# Patient Record
Sex: FEMALE | Race: WHITE | NOT HISPANIC OR LATINO | Employment: UNEMPLOYED | URBAN - METROPOLITAN AREA
[De-identification: names, ages, dates, MRNs, and addresses within clinical notes are randomized per-mention and may not be internally consistent; named-entity substitution may affect disease eponyms.]

---

## 2017-01-16 ENCOUNTER — HOSPITAL ENCOUNTER (OUTPATIENT)
Dept: MRI IMAGING | Facility: HOSPITAL | Age: 57
Discharge: HOME | End: 2017-01-16
Attending: PSYCHIATRY & NEUROLOGY
Payer: COMMERCIAL

## 2017-01-16 DIAGNOSIS — G44.039 EPISODIC PAROXYSMAL HEMICRANIA, NOT INTRACTABLE: ICD-10-CM

## 2017-01-16 PROCEDURE — 70551 MRI BRAIN STEM W/O DYE: CPT

## 2017-03-13 ENCOUNTER — ALLSCRIPTS OFFICE VISIT (OUTPATIENT)
Dept: OTHER | Facility: OTHER | Age: 57
End: 2017-03-13

## 2017-06-21 ENCOUNTER — ALLSCRIPTS OFFICE VISIT (OUTPATIENT)
Dept: OTHER | Facility: OTHER | Age: 57
End: 2017-06-21

## 2018-01-01 ENCOUNTER — OFFICE VISIT (OUTPATIENT)
Dept: NEUROLOGY | Facility: CLINIC | Age: 58
End: 2018-01-01
Payer: COMMERCIAL

## 2018-01-01 ENCOUNTER — OFFICE VISIT (OUTPATIENT)
Dept: INTERNAL MEDICINE CLINIC | Facility: CLINIC | Age: 58
End: 2018-01-01
Payer: COMMERCIAL

## 2018-01-01 VITALS
HEIGHT: 67 IN | HEART RATE: 76 BPM | OXYGEN SATURATION: 99 % | SYSTOLIC BLOOD PRESSURE: 138 MMHG | BODY MASS INDEX: 26.74 KG/M2 | DIASTOLIC BLOOD PRESSURE: 88 MMHG | WEIGHT: 170.4 LBS

## 2018-01-01 VITALS
HEART RATE: 78 BPM | SYSTOLIC BLOOD PRESSURE: 122 MMHG | DIASTOLIC BLOOD PRESSURE: 78 MMHG | HEIGHT: 67 IN | BODY MASS INDEX: 26.27 KG/M2 | WEIGHT: 167.4 LBS

## 2018-01-01 DIAGNOSIS — R25.1 TREMOR: ICD-10-CM

## 2018-01-01 DIAGNOSIS — I10 ESSENTIAL HYPERTENSION: Chronic | ICD-10-CM

## 2018-01-01 DIAGNOSIS — G44.229 CHRONIC TENSION-TYPE HEADACHE, NOT INTRACTABLE: ICD-10-CM

## 2018-01-01 DIAGNOSIS — Z23 ENCOUNTER FOR IMMUNIZATION: ICD-10-CM

## 2018-01-01 DIAGNOSIS — F17.219 CIGARETTE NICOTINE DEPENDENCE WITH NICOTINE-INDUCED DISORDER: Chronic | ICD-10-CM

## 2018-01-01 DIAGNOSIS — F41.9 ANXIETY: ICD-10-CM

## 2018-01-01 DIAGNOSIS — M81.0 OSTEOPOROSIS OF FOREARM: Chronic | ICD-10-CM

## 2018-01-01 DIAGNOSIS — Z12.31 ENCOUNTER FOR SCREENING MAMMOGRAM FOR BREAST CANCER: ICD-10-CM

## 2018-01-01 DIAGNOSIS — J43.2 CENTRILOBULAR EMPHYSEMA (HCC): Primary | Chronic | ICD-10-CM

## 2018-01-01 DIAGNOSIS — M05.79 RHEUMATOID ARTHRITIS INVOLVING MULTIPLE SITES WITH POSITIVE RHEUMATOID FACTOR (HCC): Chronic | ICD-10-CM

## 2018-01-01 DIAGNOSIS — G43.709 CHRONIC MIGRAINE WITHOUT AURA WITHOUT STATUS MIGRAINOSUS, NOT INTRACTABLE: Primary | ICD-10-CM

## 2018-01-01 PROCEDURE — 3008F BODY MASS INDEX DOCD: CPT | Performed by: INTERNAL MEDICINE

## 2018-01-01 PROCEDURE — 99214 OFFICE O/P EST MOD 30 MIN: CPT | Performed by: INTERNAL MEDICINE

## 2018-01-01 PROCEDURE — 90670 PCV13 VACCINE IM: CPT | Performed by: INTERNAL MEDICINE

## 2018-01-01 PROCEDURE — G0009 ADMIN PNEUMOCOCCAL VACCINE: HCPCS | Performed by: INTERNAL MEDICINE

## 2018-01-01 PROCEDURE — 99213 OFFICE O/P EST LOW 20 MIN: CPT | Performed by: PSYCHIATRY & NEUROLOGY

## 2018-01-01 RX ORDER — PYRIDOXINE HCL (VITAMIN B6) 100 MG
2 TABLET ORAL DAILY
COMMUNITY

## 2018-01-01 RX ORDER — ORPHENADRINE CITRATE 100 MG/1
100 TABLET, EXTENDED RELEASE ORAL
Qty: 30 TABLET | Refills: 1 | Status: SHIPPED | OUTPATIENT
Start: 2018-01-01 | End: 2019-01-01 | Stop reason: SDUPTHER

## 2018-01-01 RX ORDER — DULOXETIN HYDROCHLORIDE 30 MG/1
30 CAPSULE, DELAYED RELEASE ORAL EVERY MORNING
COMMUNITY

## 2018-01-01 RX ORDER — BUPROPION HYDROCHLORIDE 150 MG/1
TABLET ORAL
Qty: 30 TABLET | Refills: 3 | Status: SHIPPED | OUTPATIENT
Start: 2018-01-01 | End: 2019-01-01 | Stop reason: SDUPTHER

## 2018-01-01 RX ORDER — ASPIRIN 81 MG/1
TABLET ORAL
COMMUNITY
End: 2019-01-01

## 2018-01-01 RX ORDER — PRIMIDONE 50 MG/1
TABLET ORAL
Qty: 180 TABLET | Refills: 1 | Status: SHIPPED | OUTPATIENT
Start: 2018-01-01 | End: 2019-01-01 | Stop reason: SDUPTHER

## 2018-01-01 RX ORDER — ORPHENADRINE CITRATE 100 MG/1
100 TABLET, EXTENDED RELEASE ORAL 2 TIMES DAILY
COMMUNITY
End: 2019-01-01

## 2018-01-01 RX ORDER — TOPIRAMATE 50 MG/1
50 TABLET, FILM COATED ORAL
Qty: 30 TABLET | Refills: 6 | Status: SHIPPED | OUTPATIENT
Start: 2018-01-01 | End: 2019-01-01 | Stop reason: SDUPTHER

## 2018-01-01 RX ORDER — ALENDRONATE SODIUM 70 MG/1
70 TABLET ORAL WEEKLY
COMMUNITY
Start: 2018-01-01 | End: 2019-11-09

## 2018-01-01 RX ORDER — BUTALBITAL, ACETAMINOPHEN AND CAFFEINE 50; 325; 40 MG/1; MG/1; MG/1
1 TABLET ORAL 2 TIMES DAILY PRN
Qty: 40 TABLET | Refills: 1 | Status: SHIPPED | OUTPATIENT
Start: 2018-01-01 | End: 2019-01-01 | Stop reason: SDUPTHER

## 2018-01-13 VITALS
HEIGHT: 67 IN | DIASTOLIC BLOOD PRESSURE: 90 MMHG | SYSTOLIC BLOOD PRESSURE: 148 MMHG | BODY MASS INDEX: 31.08 KG/M2 | WEIGHT: 198 LBS

## 2018-01-14 VITALS
HEIGHT: 68 IN | SYSTOLIC BLOOD PRESSURE: 128 MMHG | BODY MASS INDEX: 29.1 KG/M2 | DIASTOLIC BLOOD PRESSURE: 88 MMHG | RESPIRATION RATE: 28 BRPM | HEART RATE: 84 BPM | WEIGHT: 192 LBS

## 2018-02-22 DIAGNOSIS — G43.709 CHRONIC MIGRAINE WITHOUT AURA WITHOUT STATUS MIGRAINOSUS, NOT INTRACTABLE: Primary | ICD-10-CM

## 2018-02-22 RX ORDER — TOPIRAMATE 50 MG/1
TABLET, FILM COATED ORAL
Qty: 30 TABLET | Refills: 3 | Status: SHIPPED | OUTPATIENT
Start: 2018-02-22 | End: 2018-03-13 | Stop reason: SDUPTHER

## 2018-03-09 RX ORDER — DULOXETIN HYDROCHLORIDE 60 MG/1
60 CAPSULE, DELAYED RELEASE ORAL EVERY MORNING
COMMUNITY

## 2018-03-09 RX ORDER — BUTALBITAL, ACETAMINOPHEN AND CAFFEINE 50; 325; 40 MG/1; MG/1; MG/1
1 TABLET ORAL 2 TIMES DAILY PRN
COMMUNITY
Start: 2015-10-26 | End: 2018-07-11 | Stop reason: SDUPTHER

## 2018-03-09 RX ORDER — CLONAZEPAM 0.5 MG/1
1 TABLET ORAL AS NEEDED
COMMUNITY

## 2018-03-09 RX ORDER — ORPHENADRINE CITRATE 100 MG/1
1 TABLET, EXTENDED RELEASE ORAL
COMMUNITY
Start: 2017-06-21 | End: 2018-03-13 | Stop reason: SDUPTHER

## 2018-03-09 RX ORDER — ALBUTEROL SULFATE 90 UG/1
2 AEROSOL, METERED RESPIRATORY (INHALATION) AS NEEDED
COMMUNITY
Start: 2011-10-04

## 2018-03-09 RX ORDER — METRONIDAZOLE 1 %
GEL (GRAM) TOPICAL AS NEEDED
COMMUNITY
Start: 2011-10-17 | End: 2019-01-01 | Stop reason: ALTCHOICE

## 2018-03-09 RX ORDER — MECLIZINE HCL 12.5 MG/1
1 TABLET ORAL 2 TIMES DAILY
COMMUNITY
End: 2018-04-12 | Stop reason: SDUPTHER

## 2018-03-09 RX ORDER — LISINOPRIL 40 MG/1
1 TABLET ORAL DAILY
COMMUNITY

## 2018-03-09 RX ORDER — MONTELUKAST SODIUM 10 MG/1
1 TABLET ORAL DAILY
COMMUNITY
End: 2019-01-01

## 2018-03-09 RX ORDER — MOMETASONE FUROATE 50 UG/1
2 SPRAY, METERED NASAL AS NEEDED
COMMUNITY

## 2018-03-09 RX ORDER — ATORVASTATIN CALCIUM 40 MG/1
1 TABLET, FILM COATED ORAL DAILY
COMMUNITY

## 2018-03-09 RX ORDER — PRIMIDONE 50 MG/1
1 TABLET ORAL 2 TIMES DAILY
COMMUNITY
End: 2018-06-18 | Stop reason: SDUPTHER

## 2018-03-09 RX ORDER — HYDROXYCHLOROQUINE SULFATE 200 MG/1
1 TABLET, FILM COATED ORAL DAILY
COMMUNITY
End: 2018-01-01 | Stop reason: ALTCHOICE

## 2018-03-09 RX ORDER — PANTOPRAZOLE SODIUM 40 MG/1
1 TABLET, DELAYED RELEASE ORAL 2 TIMES DAILY
COMMUNITY

## 2018-03-09 RX ORDER — BUPROPION HYDROCHLORIDE 100 MG/1
100 TABLET ORAL DAILY
COMMUNITY
End: 2018-05-25 | Stop reason: SDUPTHER

## 2018-03-09 RX ORDER — BETHANECHOL CHLORIDE 25 MG/1
1 TABLET ORAL 2 TIMES DAILY
COMMUNITY
End: 2019-01-01

## 2018-03-13 ENCOUNTER — OFFICE VISIT (OUTPATIENT)
Dept: NEUROLOGY | Facility: CLINIC | Age: 58
End: 2018-03-13
Payer: COMMERCIAL

## 2018-03-13 VITALS
HEART RATE: 89 BPM | WEIGHT: 194 LBS | SYSTOLIC BLOOD PRESSURE: 108 MMHG | BODY MASS INDEX: 30.16 KG/M2 | DIASTOLIC BLOOD PRESSURE: 78 MMHG

## 2018-03-13 DIAGNOSIS — G44.229 CHRONIC TENSION-TYPE HEADACHE, NOT INTRACTABLE: ICD-10-CM

## 2018-03-13 DIAGNOSIS — G43.709 CHRONIC MIGRAINE WITHOUT AURA WITHOUT STATUS MIGRAINOSUS, NOT INTRACTABLE: Primary | ICD-10-CM

## 2018-03-13 PROCEDURE — 99213 OFFICE O/P EST LOW 20 MIN: CPT | Performed by: PSYCHIATRY & NEUROLOGY

## 2018-03-13 RX ORDER — GABAPENTIN 300 MG/1
300 CAPSULE ORAL DAILY
COMMUNITY
End: 2019-01-01

## 2018-03-13 RX ORDER — ORPHENADRINE CITRATE 100 MG/1
100 TABLET, EXTENDED RELEASE ORAL
Qty: 30 TABLET | Refills: 1 | Status: SHIPPED | OUTPATIENT
Start: 2018-03-13 | End: 2018-06-16 | Stop reason: SDUPTHER

## 2018-03-13 RX ORDER — TOPIRAMATE 50 MG/1
50 TABLET, FILM COATED ORAL
Qty: 30 TABLET | Refills: 3 | Status: SHIPPED | OUTPATIENT
Start: 2018-03-13 | End: 2018-07-11 | Stop reason: CLARIF

## 2018-03-13 NOTE — PROGRESS NOTES
Progress Note - Neurology   Quincy Kasper 60 y o  female MRN: [id number]  Unit/Bed#:  Encounter: [id number]      Subjective:   Patient is here for a follow-up visit with a history of chronic headaches tension-type/migraines, and in the recent past had to restart Topamax due to frequent headaches  Prior to that during her visit she was also started on orphenadrine and between the 2 medications she has seen relief of headaches  She continues to have low back pain which has worsened for which she is seeing pain management at this time and was restarted on gabapentin  Patient is also followed up with orthopedics as well as Rheumatology  She denies any new neurological symptoms  She was experiencing some blurred vision recently ever since she started gabapentin and will discuss with her pain specialist     ROS:   Review of Systems   Constitutional: Positive for fatigue  HENT: Positive for tinnitus and trouble swallowing  Eyes: Positive for visual disturbance  Respiratory: Positive for cough, shortness of breath and wheezing  Cardiovascular: Negative  Gastrointestinal: Negative  Endocrine: Negative  Genitourinary: Negative  Musculoskeletal: Positive for joint swelling  Skin: Negative  Allergic/Immunologic: Negative  Neurological: Positive for dizziness, tremors and light-headedness  Hematological: Bruises/bleeds easily  Psychiatric/Behavioral: Positive for sleep disturbance  Vitals:   Vitals:    03/13/18 0932   BP: 108/78   Pulse: 89   ,Body mass index is 30 16 kg/m²      MEDS:      Current Outpatient Prescriptions:     Adalimumab (HUMIRA PEN) 40 MG/0 8ML PNKT, Inject under the skin, Disp: , Rfl:     albuterol (VENTOLIN HFA) 90 mcg/act inhaler, Inhale, Disp: , Rfl:     aspirin 81 MG tablet, Take 1 tablet by mouth daily, Disp: , Rfl:     atorvastatin (LIPITOR) 40 mg tablet, Take 1 tablet by mouth, Disp: , Rfl:     bethanechol (URECHOLINE) 25 mg tablet, Take 1 tablet by mouth 3 (three) times a day, Disp: , Rfl:     buPROPion (WELLBUTRIN) 100 mg tablet, Take by mouth, Disp: , Rfl:     butalbital-acetaminophen-caffeine (FIORICET,ESGIC) -40 mg per tablet, Take 1 tablet by mouth 2 (two) times a day as needed, Disp: , Rfl:     clonazePAM (KlonoPIN) 0 5 mg tablet, Take 1 tablet by mouth, Disp: , Rfl:     DULoxetine (CYMBALTA) 60 mg delayed release capsule, Take 90 mg by mouth daily  , Disp: , Rfl:     hydroxychloroquine (PLAQUENIL) 200 mg tablet, Take 1 tablet by mouth daily, Disp: , Rfl:     lisinopril (ZESTRIL) 40 mg tablet, Take 1 tablet by mouth daily, Disp: , Rfl:     meclizine (ANTIVERT) 12 5 MG tablet, Take 1 tablet by mouth 2 (two) times a day, Disp: , Rfl:     metroNIDAZOLE (METROGEL) 1 % gel, Apply topically, Disp: , Rfl:     mometasone (NASONEX) 50 mcg/act nasal spray, into each nostril, Disp: , Rfl:     Mometasone Furo-Formoterol Fum (DULERA) 200-5 MCG/ACT AERO, Inhale 2 puffs 2 (two) times a day, Disp: , Rfl:     montelukast (SINGULAIR) 10 mg tablet, Take 1 tablet by mouth daily, Disp: , Rfl:     orphenadrine (NORFLEX) 100 mg tablet, Take 1 tablet by mouth, Disp: , Rfl:     pantoprazole (PROTONIX) 40 mg tablet, Take 1 tablet by mouth daily, Disp: , Rfl:     primidone (MYSOLINE) 50 mg tablet, Take 1 tablet by mouth 2 (two) times a day, Disp: , Rfl:     topiramate (TOPAMAX) 50 MG tablet, TAKE 1 TABLET BY MOUTH AT BEDTIME, Disp: 30 tablet, Rfl: 3  :    Physical Exam:  General appearance: alert, appears stated age and cooperative  Head: Normocephalic, without obvious abnormality, atraumatic    Neurologic:  Her examination shows evidence of cervical and lumbosacral tenderness but no new deficits were noted on motor and sensory exam   She has restricted range of motion in the right shoulder  No cranial nerve deficit was noted  Her gait is normal based  Lab Results: I have personally reviewed pertinent reports    Imaging Studies: I have personally reviewed pertinent reports  Assessment:  1  Chronic headaches--migrainous in nature  2  Chronic cervical and lumbosacral strain  Plan:  Patient is advised to follow up with pain management in the meanwhile for the headaches she will continue with Topamax 50 mg at bedtime, she is advised to lower the dose of gabapentin to 300 mg twice a day and advised to taper herself off the orphenadrine  She will return back to see me in 6 months  3/13/2018,9:38 AM    Dictation voice to text software has been used in the creation of this document  Please consider this in light of any contextual or grammatical errors

## 2018-04-11 PROBLEM — I10 ESSENTIAL HYPERTENSION: Chronic | Status: ACTIVE | Noted: 2017-12-27

## 2018-04-11 PROBLEM — F41.9 ANXIETY: Chronic | Status: ACTIVE | Noted: 2017-03-13

## 2018-04-11 PROBLEM — I10 ESSENTIAL HYPERTENSION: Status: ACTIVE | Noted: 2017-12-27

## 2018-04-11 PROBLEM — E78.00 HYPERCHOLESTEREMIA: Chronic | Status: ACTIVE | Noted: 2017-03-13

## 2018-04-11 PROBLEM — M06.9 RHEUMATOID ARTHRITIS (HCC): Chronic | Status: ACTIVE | Noted: 2017-03-13

## 2018-04-11 PROBLEM — I25.10 ATHEROSCLEROTIC HEART DISEASE OF NATIVE CORONARY ARTERY WITHOUT ANGINA PECTORIS: Chronic | Status: ACTIVE | Noted: 2017-12-27

## 2018-04-11 PROBLEM — I25.10 ATHEROSCLEROTIC HEART DISEASE OF NATIVE CORONARY ARTERY WITHOUT ANGINA PECTORIS: Status: ACTIVE | Noted: 2017-12-27

## 2018-04-11 PROBLEM — M06.9 RHEUMATOID ARTHRITIS (HCC): Status: ACTIVE | Noted: 2017-03-13

## 2018-04-11 PROBLEM — E78.00 HYPERCHOLESTEREMIA: Status: ACTIVE | Noted: 2017-03-13

## 2018-04-11 PROBLEM — F17.200 NICOTINE DEPENDENCE: Status: ACTIVE | Noted: 2017-12-27

## 2018-04-11 PROBLEM — F17.200 NICOTINE DEPENDENCE: Chronic | Status: ACTIVE | Noted: 2017-12-27

## 2018-04-11 PROBLEM — K21.9 GERD (GASTROESOPHAGEAL REFLUX DISEASE): Status: ACTIVE | Noted: 2017-03-13

## 2018-04-11 PROBLEM — M06.00 RHEUMATOID ARTHRITIS WITH NEGATIVE RHEUMATOID FACTOR (HCC): Chronic | Status: ACTIVE | Noted: 2017-03-13

## 2018-04-11 PROBLEM — K21.9 GERD (GASTROESOPHAGEAL REFLUX DISEASE): Chronic | Status: ACTIVE | Noted: 2017-03-13

## 2018-04-11 PROBLEM — I42.9 CARDIOMYOPATHY (HCC): Chronic | Status: ACTIVE | Noted: 2017-12-27

## 2018-04-11 PROBLEM — I42.9 CARDIOMYOPATHY (HCC): Status: ACTIVE | Noted: 2017-12-27

## 2018-04-11 PROBLEM — F41.9 ANXIETY: Status: ACTIVE | Noted: 2017-03-13

## 2018-04-11 RX ORDER — CIPROFLOXACIN 250 MG/1
250 TABLET, FILM COATED ORAL EVERY 12 HOURS
Refills: 0 | COMMUNITY
Start: 2018-02-06 | End: 2018-07-11 | Stop reason: CLARIF

## 2018-04-11 RX ORDER — CEPHALEXIN 500 MG/1
CAPSULE ORAL
Refills: 0 | COMMUNITY
Start: 2018-02-02 | End: 2018-07-25 | Stop reason: CLARIF

## 2018-04-11 RX ORDER — RAMELTEON 8 MG
8 TABLET ORAL
Refills: 1 | COMMUNITY
Start: 2018-02-22 | End: 2018-04-12 | Stop reason: CLARIF

## 2018-04-11 RX ORDER — METHYLPREDNISOLONE 4 MG/1
TABLET ORAL
Refills: 0 | COMMUNITY
Start: 2018-01-29 | End: 2018-04-12 | Stop reason: CLARIF

## 2018-04-12 ENCOUNTER — OFFICE VISIT (OUTPATIENT)
Dept: INTERNAL MEDICINE CLINIC | Facility: CLINIC | Age: 58
End: 2018-04-12
Payer: COMMERCIAL

## 2018-04-12 ENCOUNTER — TELEPHONE (OUTPATIENT)
Dept: INTERNAL MEDICINE CLINIC | Facility: CLINIC | Age: 58
End: 2018-04-12

## 2018-04-12 VITALS
DIASTOLIC BLOOD PRESSURE: 80 MMHG | SYSTOLIC BLOOD PRESSURE: 120 MMHG | HEART RATE: 113 BPM | BODY MASS INDEX: 30.37 KG/M2 | OXYGEN SATURATION: 96 % | WEIGHT: 200.4 LBS | HEIGHT: 68 IN

## 2018-04-12 DIAGNOSIS — Z71.3 DIETARY COUNSELING AND SURVEILLANCE: ICD-10-CM

## 2018-04-12 DIAGNOSIS — J43.2 CENTRILOBULAR EMPHYSEMA (HCC): Chronic | ICD-10-CM

## 2018-04-12 DIAGNOSIS — E66.9 OBESITY (BMI 30-39.9): ICD-10-CM

## 2018-04-12 DIAGNOSIS — K52.9 ACUTE COLITIS: Primary | ICD-10-CM

## 2018-04-12 DIAGNOSIS — F17.219 CIGARETTE NICOTINE DEPENDENCE WITH NICOTINE-INDUCED DISORDER: Chronic | ICD-10-CM

## 2018-04-12 DIAGNOSIS — R42 DIZZINESS: ICD-10-CM

## 2018-04-12 DIAGNOSIS — N17.9 ACUTE RENAL FAILURE, UNSPECIFIED ACUTE RENAL FAILURE TYPE (HCC): ICD-10-CM

## 2018-04-12 DIAGNOSIS — I42.9 CARDIOMYOPATHY, UNSPECIFIED TYPE (HCC): Chronic | ICD-10-CM

## 2018-04-12 DIAGNOSIS — M05.79 RHEUMATOID ARTHRITIS INVOLVING MULTIPLE SITES WITH POSITIVE RHEUMATOID FACTOR (HCC): Chronic | ICD-10-CM

## 2018-04-12 DIAGNOSIS — I10 ESSENTIAL HYPERTENSION: Chronic | ICD-10-CM

## 2018-04-12 DIAGNOSIS — I25.10 ATHEROSCLEROSIS OF NATIVE CORONARY ARTERY OF NATIVE HEART WITHOUT ANGINA PECTORIS: Chronic | ICD-10-CM

## 2018-04-12 PROBLEM — M05.9 RHEUMATOID ARTHRITIS WITH POSITIVE RHEUMATOID FACTOR (HCC): Chronic | Status: ACTIVE | Noted: 2017-03-13

## 2018-04-12 PROBLEM — IMO0002 CHRONIC MIGRAINE: Chronic | Status: ACTIVE | Noted: 2018-04-12

## 2018-04-12 PROBLEM — IMO0002 CHRONIC MIGRAINE: Status: ACTIVE | Noted: 2018-04-12

## 2018-04-12 PROCEDURE — 99204 OFFICE O/P NEW MOD 45 MIN: CPT | Performed by: INTERNAL MEDICINE

## 2018-04-12 PROCEDURE — 3074F SYST BP LT 130 MM HG: CPT | Performed by: INTERNAL MEDICINE

## 2018-04-12 PROCEDURE — 3079F DIAST BP 80-89 MM HG: CPT | Performed by: INTERNAL MEDICINE

## 2018-04-12 RX ORDER — NICOTINE 21 MG/24HR
PATCH, TRANSDERMAL 24 HOURS TRANSDERMAL
Refills: 0 | COMMUNITY
Start: 2018-04-05 | End: 2018-01-01 | Stop reason: ALTCHOICE

## 2018-04-12 RX ORDER — METRONIDAZOLE 500 MG/1
500 TABLET ORAL EVERY 6 HOURS
Refills: 0 | COMMUNITY
Start: 2018-04-05 | End: 2018-01-01 | Stop reason: ALTCHOICE

## 2018-04-12 RX ORDER — MECLIZINE HYDROCHLORIDE 25 MG/1
25 TABLET ORAL 2 TIMES DAILY
Qty: 60 TABLET | Refills: 3 | Status: SHIPPED | OUTPATIENT
Start: 2018-04-12 | End: 2018-08-08 | Stop reason: SDUPTHER

## 2018-04-12 NOTE — PROGRESS NOTES
INTERNAL MEDICINE INITIAL OFFICE VISIT  [facility] [address] [place] [address]    NAME: Radha Parikh  AGE: 60 y o  SEX: female  : 1960     DATE: 2018     Assessment and Plan:     1  Acute colitis    Finish course of antibiotics as prescribed  Follows regularly with Dr Keyana Walters    2  Acute renal failure, unspecified acute renal failure type ([address] )    Prerenal etiology due to colitis  Improved with IV hydration  3  Atherosclerosis of native coronary artery of native heart without angina pectoris    Continue current cardiac medications as prescribed  Previous cath showed 40-50% mid LAD  4  Centrilobular emphysema ([address] )    Continue inhalers as prescribed  Patient was highly recommend to quit smoking  She is trying to use the nicotine patch to cut down  She is not motivated to quit completely  5  Essential hypertension    Blood pressure well controlled  Continue current anti-hypertensives  6  Cigarette nicotine dependence with nicotine-induced disorder    The patient is sincerely urged to quit smoking  The numerous direct health benefits are discussed  7  Cardiomyopathy, unspecified type ([address] )    Last ECHO showed improvement in EF to normal     8  Rheumatoid arthritis involving multiple sites with positive rheumatoid factor ([address] )    Patient is instructed to continue Humira as prescribed  She has follow-up with rheumatology in May  There was no synovitis on exam today  9  Obesity (BMI 30-39  9)    Diet and exercise recommendations given to patient  Barriers to treatment include multiple medical co-morbidities and weight gain side effects of medication  Also her chronic joint issues make it hard for her to exercise  Return in about 3 months (around 2018) for Follow-up, AWV       Counseling:     · Medication Side Effects - Adverse side effects of medications were reviewed with the patient/guardian today: Yes  · I have spent 45 minutes with Patient  today in which greater than 50% of this time was spent in counseling/coordination of care regarding Diagnostic results, Prognosis, Risks and benefits of tx options, Intructions for management, Patient and family education, Importance of tx compliance, Risk factor reductions and Impressions  · Barriers to treatment include: multiple medical co-morbidities, chronic joint complaints, medication side effects     Chief Complaint:     Chief Complaint   Patient presents with    [facility]     new pt and labs; 04/05/2018    Follow-up     discharged from [address] [place] [address]; 04/05/2018      History of Present Illness:     Patient presents to establish care  She is a 60year old female with multiple medical co-morbidities  She  has a past medical history of Anxiety; Cancer of skin of abdomen; Cardiomyopathy ([address] ); Carpal tunnel syndrome; Cervical herniated disc; Chronic sinusitis; COPD (chronic obstructive pulmonary disease) ([address] ); Depression; GERD (gastroesophageal reflux disease); Hepatitis C antibody test positive; Hypercholesteremia; Hypertension; Liver hemangioma; Lumbar disc herniation; Nasal turbinate hypertrophy; Rheumatoid arthritis ([address] ); RLS (restless legs syndrome); and Tension headache  She was recently admitted to [facility] after intractable bouts of nausea, vomiting, diarrhea  She had recently been started on Humira for RA  She has previously been following with doctors in [address] [place] [address]  She was found to be in pre-renal MELONY and had evidence of colitis on CT scan  She was treated with supportive care, IV fluids, and IV antibiotics  She had improvement in her renal function with hydration  Electrolytes were repleted as needed  She was discharged on oral antibiotics  She states her abdominal symptoms have improved  She has an appt with new rheumatologist, Dr Sohan Sanchez at [facility]  Last rheumatoid factor I am able to see was negative; however, patient states it was positive when she was first diagnosed  She states she has multiple joints affected - hands, shoulders, knees  Previously had cath back in 2011 which showed 40-50% mid LAD stenosis  She did not have any intervention performed  She has history of cardiomyopathy as well which was thought to be ischemic  She was started on lisinopril and her last ECHO showed improvement in her EF  She has COPD (mod to severe obstruction by last PFTs) and was previously followed by Dr Burk Doctor  She states she has appt with new pulmonologist coming up  She has smoked for about 26 years  She smokes 0 5 ppd  She has a very stressful household  She is trying to cut down with the nicotine patch  The following portions of the patient's history were reviewed and updated as appropriate: allergies, current medications, past family history, past medical history, past social history, past surgical history and problem list      Review of Systems:     Review of Systems   Constitutional: Negative for appetite change, chills, fatigue and fever  HENT: Negative for congestion, hearing loss, postnasal drip, rhinorrhea, sore throat, tinnitus and trouble swallowing  Eyes: Negative for pain, discharge, redness and visual disturbance  Respiratory: Negative for cough, chest tightness, shortness of breath and wheezing  Cardiovascular: Negative for chest pain, palpitations and leg swelling  Gastrointestinal: Negative for abdominal distention, abdominal pain, blood in stool, constipation, diarrhea, nausea and vomiting  Endocrine: Negative for cold intolerance, heat intolerance, polydipsia, polyphagia and polyuria  Genitourinary: Negative for difficulty urinating, dysuria, frequency, hematuria and urgency  Musculoskeletal: Positive for arthralgias, back pain and myalgias  Negative for gait problem, joint swelling, neck pain and neck stiffness  Skin: Negative for color change and rash  Neurological: Positive for dizziness   Negative for tremors, seizures, syncope, speech difficulty, weakness, light-headedness, numbness and headaches  Hematological: Negative for adenopathy  Does not bruise/bleed easily  Psychiatric/Behavioral: Negative for agitation, behavioral problems, confusion, hallucinations, sleep disturbance and suicidal ideas  The patient is not nervous/anxious  Past Medical History:     Past Medical History:   Diagnosis Date    Anxiety     Cancer of skin of abdomen     Cardiomyopathy ([address] )     Carpal tunnel syndrome     Cervical herniated disc     Chronic sinusitis     COPD (chronic obstructive pulmonary disease) (HCC)     Depression     GERD (gastroesophageal reflux disease)     Hepatitis C antibody test positive     No detectable HCV RNA    Hypercholesteremia     Hypertension     Liver hemangioma     Lumbar disc herniation     Nasal turbinate hypertrophy     Rheumatoid arthritis (HCC)     RLS (restless legs syndrome)     Tension headache       Past Surgical History:     Past Surgical History:   Procedure Laterality Date    CHOLECYSTECTOMY      1/22/2016    COLONOSCOPY  11/22/2011    Dr Perea Getting Left 05/28/2014    FOOT SURGERY Right 09/17/2013    2nd toe    FOOT SURGERY Bilateral 11/08/2017    HAND SURGERY  05/23/2016    Right index finger and knuckle repaired    KNEE SURGERY Right 07/10/2012    LARYNGOSCOPY  05/15/2012    direct laryngoscopy with stripping of vocal cords    LARYNGOSCOPY  04/17/2012    direct laryngoscopy with stripping of vocal cords    MOHS SURGERY  02/27/2015    Trunk/Abdomen    NASAL SEPTUM SURGERY  03/05/2013    NEUROPLASTY / TRANSPOSITION MEDIAN NERVE AT CARPAL TUNNEL Right 03/09/2015    ROTATOR CUFF REPAIR Left 10/23/2012    SHOULDER ARTHROPLASTY      total shoulder replacement-right-9/22/2016    SINUS SURGERY  05/16/2017    Dr Krzysztof Choi at [address] History:   She reports that she has been smoking Cigarettes  She started smoking about 26 years ago   She has a 13 00 pack-year smoking history  She has never used smokeless tobacco  She reports that she drinks alcohol  She reports that she does not use drugs       Family History:     Family History   Problem Relation Age of Onset    Heart disease Mother      cardiac pacemaker    Lymphoma Father     Cancer Sister     Stroke Maternal Grandfather     Cancer Family     Other Family      bone issues      Current Medications:     Current Outpatient Prescriptions:     Adalimumab (HUMIRA PEN) 40 MG/0 8ML PNKT, Inject under the skin, Disp: , Rfl:     albuterol (VENTOLIN HFA) 90 mcg/act inhaler, Inhale 2 puffs 2 (two) times a day  , Disp: , Rfl:     aspirin 81 MG tablet, Take 1 tablet by mouth daily, Disp: , Rfl:     atorvastatin (LIPITOR) 40 mg tablet, Take 1 tablet by mouth, Disp: , Rfl:     bethanechol (URECHOLINE) 25 mg tablet, Take 1 tablet by mouth 3 (three) times a day, Disp: , Rfl:     buPROPion (WELLBUTRIN) 100 mg tablet, Take 100 mg by mouth daily  , Disp: , Rfl:     butalbital-acetaminophen-caffeine (FIORICET,ESGIC) -40 mg per tablet, Take 1 tablet by mouth 2 (two) times a day as needed, Disp: , Rfl:     cephalexin (KEFLEX) 500 mg capsule, TAKE ONE CAPSULE 4 TIMES A DAY, Disp: , Rfl: 0    ciprofloxacin (CIPRO) 250 mg tablet, Take 250 mg by mouth every 12 (twelve) hours, Disp: , Rfl: 0    clonazePAM (KlonoPIN) 0 5 mg tablet, Take 1 tablet by mouth as needed  , Disp: , Rfl:     DULoxetine (CYMBALTA) 60 mg delayed release capsule, Take 90 mg by mouth daily  , Disp: , Rfl:     gabapentin (NEURONTIN) 300 mg capsule, Take 300 mg by mouth 3 (three) times a day as needed, Disp: , Rfl:     hydroxychloroquine (PLAQUENIL) 200 mg tablet, Take 1 tablet by mouth daily, Disp: , Rfl:     lisinopril (ZESTRIL) 40 mg tablet, Take 1 tablet by mouth daily, Disp: , Rfl:     meclizine (ANTIVERT) 25 mg tablet, Take 1 tablet (25 mg total) by mouth 2 (two) times a day, Disp: 60 tablet, Rfl: 3    metroNIDAZOLE (FLAGYL) 500 mg tablet, Take 500 mg by mouth every 6 (six) hours, Disp: , Rfl: 0    metroNIDAZOLE (METROGEL) 1 % gel, Apply topically as needed  , Disp: , Rfl:     mometasone (NASONEX) 50 mcg/act nasal spray, into each nostril, Disp: , Rfl:     montelukast (SINGULAIR) 10 mg tablet, Take 1 tablet by mouth daily, Disp: , Rfl:     nicotine (NICODERM CQ) 14 mg/24hr TD 24 hr patch, APPLY 1 PATCH DAILY AND REMOVE AT BEDTIME AS DIRECTED, Disp: , Rfl: 0    orphenadrine (NORFLEX) 100 mg tablet, Take 1 tablet (100 mg total) by mouth daily at bedtime for 30 days, Disp: 30 tablet, Rfl: 1    pantoprazole (PROTONIX) 40 mg tablet, Take 1 tablet by mouth 2 (two) times a day  , Disp: , Rfl:     primidone (MYSOLINE) 50 mg tablet, Take 1 tablet by mouth 2 (two) times a day, Disp: , Rfl:     topiramate (TOPAMAX) 50 MG tablet, Take 1 tablet (50 mg total) by mouth daily at bedtime for 30 days, Disp: 30 tablet, Rfl: 3     Allergies: Allergies   Allergen Reactions    Pollen Extract Itching and Sneezing    Clindamycin Rash      Physical Exam:     /80 (BP Location: Left arm, Patient Position: Sitting, Cuff Size: Standard)   Pulse (!) 113   Ht 5' 7 5" (1 715 m)   Wt 90 9 kg (200 lb 6 4 oz)   SpO2 96%   BMI 30 92 kg/m²     Physical Exam   Constitutional: She is oriented to person, place, and time  She appears well-developed and well-nourished  No distress  HENT:   Head: Normocephalic and atraumatic  Eyes: Conjunctivae and EOM are normal  Pupils are equal, round, and reactive to light  Right eye exhibits no discharge  Left eye exhibits no discharge  No scleral icterus  Neck: Normal range of motion  Neck supple  No JVD present  No tracheal deviation present  No thyromegaly present  Cardiovascular: Normal rate, regular rhythm, normal heart sounds and intact distal pulses  Exam reveals no gallop and no friction rub  No murmur heard  Pulmonary/Chest: Effort normal and breath sounds normal  No stridor  No respiratory distress  She has no wheezes  She has no rales  She exhibits no tenderness  Abdominal: Soft  Bowel sounds are normal  She exhibits no distension and no mass  There is no tenderness  There is no rebound and no guarding  Rounded/obese abdomen   Musculoskeletal: Normal range of motion  She exhibits deformity (Ulnar deviation bilateral hands)  She exhibits no edema or tenderness  Lymphadenopathy:     She has no cervical adenopathy  Neurological: She is alert and oriented to person, place, and time  No cranial nerve deficit  She exhibits normal muscle tone  Coordination normal    Skin: Skin is warm and dry  No rash noted  She is not diaphoretic  No erythema  No pallor  Psychiatric: She has a normal mood and affect  Her behavior is normal    Vitals reviewed  Data:     Laboratory Results: I have personally reviewed the pertinent laboratory results/reports     Radiology/Other Diagnostic Testing Results: I have personally reviewed pertinent reports        Airam Ervin DO  [facility]

## 2018-04-12 NOTE — PATIENT INSTRUCTIONS
Colitis   WHAT YOU NEED TO KNOW:   Colitis is swelling and irritation of your colon  Colitis may be caused by ulcers or a problem with your immune system  Bacteria, a virus, or a parasite may also cause colitis  The cause may not be known  You may have diarrhea, abdominal pain, fever, or blood or mucus in your bowel movement  DISCHARGE INSTRUCTIONS:   Return to the emergency department if:   · You have sudden trouble breathing  · Your bowel movements are black or have blood in them  · You have blood in your vomit  · You have severe abdominal pain or your abdomen is swollen and feels hard  · You have any of the following signs of dehydration:     ¨ Dizziness or weakness    ¨ Dry mouth, cracked lips, or severe thirst    ¨ Fast heartbeat or breathing    ¨ Urinating very little or not at all  Contact your healthcare provider if:   · Your symptoms get worse or do not go away  · You have a fever, chills, cough, or feel weak and achy  · You suddenly lose weight without trying  · You have questions or concerns about your condition or care  Medicines:   · Medicines  may be given to decrease inflammation in your colon and treat diarrhea  · Take your medicine as directed  Contact your healthcare provider if you think your medicine is not helping or if you have side effects  Tell him of her if you are allergic to any medicine  Keep a list of the medicines, vitamins, and herbs you take  Include the amounts, and when and why you take them  Bring the list or the pill bottles to follow-up visits  Carry your medicine list with you in case of an emergency  Manage your symptoms:   · Drink liquids as directed  to help prevent dehydration  Good liquids to drink include water, juice, and broth  Ask how much liquid to drink each day  You may need to drink an oral rehydration solution (ORS)  An ORS contains a balance of water, salt, and sugar to replace body fluids lost during diarrhea       · Eat a variety of healthy foods  Healthy foods include fruits, vegetables, whole-grain breads, beans, low-fat dairy products, lean meats, and fish  You may need to eat several small meals throughout the day instead of large meals  Avoid spicy foods, caffeine, chocolate, and foods high in fat  · Talk to your healthcare provider before you take NSAIDs  NSAIDs can cause worsen your symptoms if ulcers are causing your colitis  · Start to exercise when you feel better  Regular exercise helps your bowels work normally  Ask about the best exercise plan for you  Follow up with your healthcare provider as directed: You may need to return for a colonoscopy or other tests  Write down how often you have a bowel movements and what they look like  Bring this to your follow-up visits  Write down your questions so you remember to ask them during your visits  © 2017 [address] [employer] Information is for End User's use only and may not be sold, redistributed or otherwise used for commercial purposes  All illustrations and images included in CareNotes® are the copyrighted property of A D A M , Inc  or [employer]  The above information is an  only  It is not intended as medical advice for individual conditions or treatments  Talk to your doctor, nurse or pharmacist before following any medical regimen to see if it is safe and effective for you  Cigarette Smoking and Your Health   AMBULATORY CARE:   Risks to your health if you smoke:  Nicotine and other chemicals found in tobacco damage every cell in your body  Even if you are a light smoker, you have an increased risk for cancer, heart disease, and lung disease  If you are pregnant or have diabetes, smoking increases your risk for complications  Benefits to your health if you stop smoking:   · You decrease respiratory symptoms such as coughing, wheezing, and shortness of breath       · You reduce your risk for cancers of the lung, mouth, throat, kidney, bladder, pancreas, stomach, and cervix  If you already have cancer, you increase the benefits of chemotherapy  You also reduce your risk for cancer returning or a second cancer from developing  · You reduce your risk for heart disease, blood clots, heart attack, and stroke  · You reduce your risk for lung infections, and diseases such as pneumonia, asthma, chronic bronchitis, and emphysema  · Your circulation improves  More oxygen can be delivered to your body  If you have diabetes, you lower your risk for complications, such as kidney, artery, and eye diseases  You also lower your risk for nerve damage  Nerve damage can lead to amputations, poor vision, and blindness  · You improve your body's ability to heal and to fight infections  Benefits to the health of others if you stop smoking:  Tobacco is harmful to nonsmokers who breathe in your secondhand smoke  The following are ways the health of others around you may improve when you stop smoking:  · You lower the risks for lung cancer and heart disease in nonsmoking adults  · If you are pregnant, you lower the risk for miscarriage, early delivery, low birth weight, and stillbirth  You also lower your baby's risk for SIDS, obesity, developmental delay, and neurobehavioral problems, such as ADHD  · If you have children, you lower their risk for ear infections, colds, pneumonia, bronchitis, and asthma  For more information and support to stop smoking:   · Smokefree  gov  Phone: 9- 967 - 414-3801  Web Address: www smokefrGen One Cig  gov  Follow up with your healthcare provider as directed:  Write down your questions so you remember to ask them during your visits  © 2017 [address] [employer] Information is for End User's use only and may not be sold, redistributed or otherwise used for commercial purposes   All illustrations and images included in CareNotes® are the copyrighted property of [employer]  or [employer]  The above information is an  only  It is not intended as medical advice for individual conditions or treatments  Talk to your doctor, nurse or pharmacist before following any medical regimen to see if it is safe and effective for you

## 2018-05-25 DIAGNOSIS — F32.A DEPRESSION, UNSPECIFIED DEPRESSION TYPE: Primary | ICD-10-CM

## 2018-05-25 RX ORDER — BUPROPION HYDROCHLORIDE 100 MG/1
100 TABLET ORAL DAILY
Qty: 90 TABLET | Refills: 3 | Status: SHIPPED | OUTPATIENT
Start: 2018-05-25 | End: 2018-07-25

## 2018-06-06 ENCOUNTER — OFFICE VISIT (OUTPATIENT)
Dept: INTERNAL MEDICINE CLINIC | Facility: CLINIC | Age: 58
End: 2018-06-06
Payer: COMMERCIAL

## 2018-06-06 VITALS
HEIGHT: 68 IN | SYSTOLIC BLOOD PRESSURE: 122 MMHG | OXYGEN SATURATION: 98 % | DIASTOLIC BLOOD PRESSURE: 80 MMHG | TEMPERATURE: 98.3 F | BODY MASS INDEX: 28.79 KG/M2 | WEIGHT: 190 LBS | HEART RATE: 82 BPM

## 2018-06-06 DIAGNOSIS — R07.89 CHEST WALL DISCOMFORT: Primary | ICD-10-CM

## 2018-06-06 PROCEDURE — 99214 OFFICE O/P EST MOD 30 MIN: CPT | Performed by: PHYSICIAN ASSISTANT

## 2018-06-06 NOTE — PROGRESS NOTES
Assessment/Plan:  I am unable to reproduce her chest wall pain she is reassured she will return if her symptoms become worse she will continue with her scheduled follow-up       Diagnoses and all orders for this visit:    Chest wall discomfort        No problem-specific Assessment & Plan notes found for this encounter  Subjective:      Patient ID: Johanna Deras is a 60 y o  female  She has noticed a clicking sensation and occasional sharp pain in her left lower anterior chest wall when she twists or moves in a certain way  It was more noticeable yesterday and over the past week but she has had this on off for several months  She has had previous history of compression fractures and she thinks rib fractures in the past   No trauma or obvious injury or severe coughing in the past 2 or 3 months  She has a lot of chronic joint complaints she follows with Rheumatology  She has a history of colitis and hepatitis C follows with GI history of headaches follows with Neurology history of chronic ENT problems following with ENT  History of lung disease due to smoking follows with Pulmonary  She has a history of coronary disease following with Cardiology  She has been ambulatory overall stable over the past couple of months        The following portions of the patient's history were reviewed and updated as appropriate:   She has a past medical history of Anxiety; Cancer of skin of abdomen; Cardiomyopathy ([address] ); Carpal tunnel syndrome; Cervical herniated disc; Chronic sinusitis; COPD (chronic obstructive pulmonary disease) ([address] ); Depression; GERD (gastroesophageal reflux disease); Hepatitis C antibody test positive; Hypercholesteremia; Hypertension; Liver hemangioma; Lumbar disc herniation; Nasal turbinate hypertrophy; Rheumatoid arthritis ([address] ); RLS (restless legs syndrome); and Tension headache ,   does not have any pertinent problems on file  ,   has a past surgical history that includes Foot surgery (Left, 05/28/2014); Hand surgery (05/23/2016); Cholecystectomy; Laryngoscopy (05/15/2012); Knee surgery (Right, 07/10/2012); Nasal septum surgery (03/05/2013); Neuroplasty / transposition median nerve at carpal tunnel (Right, 03/09/2015); Rotator cuff repair (Left, 10/23/2012); SHOULDER ARTHROPLASTY; Mohs surgery (02/27/2015); Sinus surgery (05/16/2017); Colonoscopy (11/22/2011); Laryngoscopy (04/17/2012); Foot surgery (Right, 09/17/2013); and Foot surgery (Bilateral, 11/08/2017)  ,  family history includes Cancer in her family and sister; Heart disease in her mother; Lymphoma in her father; Other in her family; Stroke in her maternal grandfather  ,   reports that she has been smoking Cigarettes  She started smoking about 26 years ago  She has a 13 00 pack-year smoking history  She has never used smokeless tobacco  She reports that she drinks alcohol  She reports that she does not use drugs  ,  is allergic to pollen extract and clindamycin     Current Outpatient Prescriptions   Medication Sig Dispense Refill    Adalimumab (HUMIRA PEN) 40 MG/0 8ML PNKT Inject under the skin      albuterol (VENTOLIN HFA) 90 mcg/act inhaler Inhale 2 puffs 2 (two) times a day        aspirin 81 MG tablet Take 1 tablet by mouth daily      atorvastatin (LIPITOR) 40 mg tablet Take 1 tablet by mouth      bethanechol (URECHOLINE) 25 mg tablet Take 1 tablet by mouth 2 (two) times a day        buPROPion (WELLBUTRIN) 100 mg tablet Take 1 tablet (100 mg total) by mouth daily 90 tablet 3    butalbital-acetaminophen-caffeine (FIORICET,ESGIC) -40 mg per tablet Take 1 tablet by mouth 2 (two) times a day as needed      clonazePAM (KlonoPIN) 0 5 mg tablet Take 1 tablet by mouth as needed        DULoxetine (CYMBALTA) 60 mg delayed release capsule Take 90 mg by mouth daily        eszopiclone (LUNESTA) 2 mg tablet TAKE 1 TABLET (2 MG) BY MOUTH DAILY IMMEDIATELY BEFORE BEDTIME  0    gabapentin (NEURONTIN) 300 mg capsule Take 300 mg by mouth daily  hydroxychloroquine (PLAQUENIL) 200 mg tablet Take 1 tablet by mouth daily      lidocaine (XYLOCAINE) 5 % ointment apply locally FOUR TIMES DAILY  2    lisinopril (ZESTRIL) 40 mg tablet Take 1 tablet by mouth daily      meclizine (ANTIVERT) 25 mg tablet Take 1 tablet (25 mg total) by mouth 2 (two) times a day 60 tablet 3    meloxicam (MOBIC) 7 5 mg tablet TAKE 1 TABLET(S) EVERY DAY BY ORAL ROUTE WITH MEALS FOR 21 DAYS   0    metroNIDAZOLE (METROGEL) 1 % gel Apply topically as needed        mometasone (NASONEX) 50 mcg/act nasal spray into each nostril      montelukast (SINGULAIR) 10 mg tablet Take 1 tablet by mouth daily      orphenadrine (NORFLEX) 100 mg tablet Take 1 tablet (100 mg total) by mouth daily at bedtime for 30 days 30 tablet 1    pantoprazole (PROTONIX) 40 mg tablet Take 1 tablet by mouth 2 (two) times a day        primidone (MYSOLINE) 50 mg tablet Take 1 tablet by mouth 2 (two) times a day      topiramate (TOPAMAX) 50 MG tablet Take 1 tablet (50 mg total) by mouth daily at bedtime for 30 days 30 tablet 3    cephalexin (KEFLEX) 500 mg capsule TAKE ONE CAPSULE 4 TIMES A DAY  0    ciprofloxacin (CIPRO) 250 mg tablet Take 250 mg by mouth every 12 (twelve) hours  0    metroNIDAZOLE (FLAGYL) 500 mg tablet Take 500 mg by mouth every 6 (six) hours  0    nicotine (NICODERM CQ) 14 mg/24hr TD 24 hr patch APPLY 1 PATCH DAILY AND REMOVE AT BEDTIME AS DIRECTED  0     No current facility-administered medications for this visit  Review of Systems   Constitutional: Negative for activity change, appetite change, chills, diaphoresis, fatigue, fever and unexpected weight change  HENT: Negative for congestion, dental problem, drooling, ear discharge, ear pain, facial swelling, hearing loss, nosebleeds, postnasal drip, rhinorrhea, sinus pain, sinus pressure, sneezing, sore throat, tinnitus, trouble swallowing and voice change      Eyes: Negative for photophobia, pain, discharge, redness, itching and visual disturbance  Respiratory: Negative for apnea, cough, choking, chest tightness, shortness of breath, wheezing and stridor  Cardiovascular: Negative for chest pain, palpitations and leg swelling  Gastrointestinal: Negative for abdominal distention, abdominal pain, anal bleeding, blood in stool, constipation, diarrhea, nausea, rectal pain and vomiting  Endocrine: Negative for cold intolerance, heat intolerance, polydipsia, polyphagia and polyuria  Genitourinary: Negative for decreased urine volume, difficulty urinating, dysuria, enuresis, flank pain, frequency, genital sores, hematuria and urgency  Musculoskeletal: Positive for arthralgias and back pain  Negative for gait problem, joint swelling, myalgias, neck pain and neck stiffness  Skin: Negative for color change, pallor, rash and wound  Neurological: Negative for dizziness, tremors, seizures, syncope, facial asymmetry, speech difficulty, weakness, light-headedness, numbness and headaches  Hematological: Negative for adenopathy  Does not bruise/bleed easily  Psychiatric/Behavioral: Negative for agitation, behavioral problems, confusion, decreased concentration, dysphoric mood, hallucinations, self-injury, sleep disturbance and suicidal ideas  The patient is not nervous/anxious and is not hyperactive  Objective:  Vitals:    06/06/18 1448   BP: 122/80   BP Location: Left arm   Patient Position: Sitting   Pulse: 82   Temp: 98 3 °F (36 8 °C)   SpO2: 98%   Weight: 86 2 kg (190 lb)   Height: 5' 7 5" (1 715 m)     Body mass index is 29 32 kg/m²  Physical Exam   Constitutional: She is oriented to person, place, and time  She appears well-developed  Obese   HENT:   Head: Normocephalic  Right Ear: External ear normal    Left Ear: External ear normal    Mouth/Throat: Oropharynx is clear and moist    Eyes: Conjunctivae are normal  Pupils are equal, round, and reactive to light  Neck: Neck supple  No thyromegaly present  Cardiovascular: Normal rate, regular rhythm, normal heart sounds and intact distal pulses  Exam reveals no gallop and no friction rub  No murmur heard  Pulmonary/Chest: Effort normal and breath sounds normal    Abdominal: Soft  Bowel sounds are normal    Musculoskeletal: Normal range of motion  She exhibits no edema, tenderness or deformity (Stooped gait)  Neurological: She is alert and oriented to person, place, and time  She has normal reflexes  Skin: Skin is warm and dry

## 2018-06-16 DIAGNOSIS — G44.229 CHRONIC TENSION-TYPE HEADACHE, NOT INTRACTABLE: ICD-10-CM

## 2018-06-16 DIAGNOSIS — G43.709 CHRONIC MIGRAINE WITHOUT AURA WITHOUT STATUS MIGRAINOSUS, NOT INTRACTABLE: ICD-10-CM

## 2018-06-18 DIAGNOSIS — R25.1 TREMOR: Primary | ICD-10-CM

## 2018-06-18 RX ORDER — TOPIRAMATE 50 MG/1
TABLET, FILM COATED ORAL
Qty: 30 TABLET | Refills: 3 | Status: SHIPPED | OUTPATIENT
Start: 2018-06-18 | End: 2018-10-02 | Stop reason: SDUPTHER

## 2018-06-18 RX ORDER — PRIMIDONE 50 MG/1
50 TABLET ORAL 2 TIMES DAILY
Qty: 180 TABLET | Refills: 1 | Status: SHIPPED | OUTPATIENT
Start: 2018-06-18 | End: 2018-01-01 | Stop reason: SDUPTHER

## 2018-06-18 RX ORDER — ORPHENADRINE CITRATE 100 MG/1
100 TABLET, EXTENDED RELEASE ORAL
Qty: 30 TABLET | Refills: 1 | Status: SHIPPED | OUTPATIENT
Start: 2018-06-18 | End: 2018-08-09 | Stop reason: SDUPTHER

## 2018-06-22 ENCOUNTER — TELEPHONE (OUTPATIENT)
Dept: INTERNAL MEDICINE CLINIC | Facility: CLINIC | Age: 58
End: 2018-06-22

## 2018-06-22 NOTE — TELEPHONE ENCOUNTER
This was sent on 6/18 by dr Mook Acevedo and received by pharm  I notified pt of this and she said it is the primidone that she needs, this was also sent on 6/18 to [facility] and received   Pt will check w/ pharm

## 2018-06-22 NOTE — TELEPHONE ENCOUNTER
Pt called the script line asking for a refill of her Topiramate 50 mg  Pharmacy: [facility]   PH# [phone]

## 2018-07-06 DIAGNOSIS — G44.89 OTHER HEADACHE SYNDROME: Primary | ICD-10-CM

## 2018-07-06 RX ORDER — BUTALBITAL, ACETAMINOPHEN AND CAFFEINE 50; 325; 40 MG/1; MG/1; MG/1
TABLET ORAL
Qty: 30 TABLET | Refills: 0 | OUTPATIENT
Start: 2018-07-06

## 2018-07-11 ENCOUNTER — TELEPHONE (OUTPATIENT)
Dept: NEUROLOGY | Facility: CLINIC | Age: 58
End: 2018-07-11

## 2018-07-11 DIAGNOSIS — G43.709 CHRONIC MIGRAINE WITHOUT AURA WITHOUT STATUS MIGRAINOSUS, NOT INTRACTABLE: Primary | ICD-10-CM

## 2018-07-11 RX ORDER — BUTALBITAL, ACETAMINOPHEN AND CAFFEINE 50; 325; 40 MG/1; MG/1; MG/1
1 TABLET ORAL 2 TIMES DAILY PRN
Qty: 40 TABLET | Refills: 1 | Status: SHIPPED | OUTPATIENT
Start: 2018-07-11 | End: 2018-01-01 | Stop reason: SDUPTHER

## 2018-07-11 NOTE — TELEPHONE ENCOUNTER
Please call patient and inform her that I have send the Fioricet prescription to her pharmacy electronically

## 2018-07-11 NOTE — TELEPHONE ENCOUNTER
Pt calls requesting refill for fioricet & gabapentin  It appears per last OVN that pt was to follow w/[facility] for gabapentin, pt agreeable to call them  I did explain to pt that I do not see fioricet mentioned in treatment plan form last visit & that this request was denied last week, pt unsure as to why  Pt c/o frequent stress HA recently, life stressors     Please advise

## 2018-07-25 ENCOUNTER — OFFICE VISIT (OUTPATIENT)
Dept: INTERNAL MEDICINE CLINIC | Facility: CLINIC | Age: 58
End: 2018-07-25
Payer: COMMERCIAL

## 2018-07-25 ENCOUNTER — TELEPHONE (OUTPATIENT)
Dept: INTERNAL MEDICINE CLINIC | Facility: CLINIC | Age: 58
End: 2018-07-25

## 2018-07-25 VITALS
BODY MASS INDEX: 27.49 KG/M2 | OXYGEN SATURATION: 97 % | SYSTOLIC BLOOD PRESSURE: 130 MMHG | WEIGHT: 181.4 LBS | HEIGHT: 68 IN | DIASTOLIC BLOOD PRESSURE: 80 MMHG | HEART RATE: 73 BPM

## 2018-07-25 DIAGNOSIS — Z13.31 DEPRESSION SCREENING NEGATIVE: ICD-10-CM

## 2018-07-25 DIAGNOSIS — Z00.00 MEDICARE ANNUAL WELLNESS VISIT, INITIAL: Primary | ICD-10-CM

## 2018-07-25 DIAGNOSIS — J01.90 ACUTE SINUSITIS, RECURRENCE NOT SPECIFIED, UNSPECIFIED LOCATION: ICD-10-CM

## 2018-07-25 DIAGNOSIS — J43.2 CENTRILOBULAR EMPHYSEMA (HCC): Chronic | ICD-10-CM

## 2018-07-25 DIAGNOSIS — F41.9 ANXIETY: ICD-10-CM

## 2018-07-25 PROCEDURE — 99214 OFFICE O/P EST MOD 30 MIN: CPT | Performed by: INTERNAL MEDICINE

## 2018-07-25 PROCEDURE — G0438 PPPS, INITIAL VISIT: HCPCS | Performed by: INTERNAL MEDICINE

## 2018-07-25 PROCEDURE — 3725F SCREEN DEPRESSION PERFORMED: CPT | Performed by: INTERNAL MEDICINE

## 2018-07-25 PROCEDURE — 3008F BODY MASS INDEX DOCD: CPT | Performed by: INTERNAL MEDICINE

## 2018-07-25 RX ORDER — BUPROPION HYDROCHLORIDE 150 MG/1
150 TABLET ORAL DAILY
Qty: 30 TABLET | Refills: 3 | Status: SHIPPED | OUTPATIENT
Start: 2018-07-25 | End: 2018-01-01 | Stop reason: SDUPTHER

## 2018-07-25 RX ORDER — FLUTICASONE PROPIONATE 50 MCG
1 SPRAY, SUSPENSION (ML) NASAL DAILY
Qty: 16 G | Refills: 3 | Status: SHIPPED | OUTPATIENT
Start: 2018-07-25 | End: 2019-01-01

## 2018-07-25 RX ORDER — DOXYCYCLINE 100 MG/1
100 CAPSULE ORAL 2 TIMES DAILY
Qty: 14 CAPSULE | Refills: 0 | Status: SHIPPED | OUTPATIENT
Start: 2018-07-25 | End: 2018-08-01

## 2018-07-25 NOTE — PATIENT INSTRUCTIONS
MEDICARE WELLNESS COUNSELING/DISCUSSION:    Cardiovascular screening and prevention: The risks and benefits of screening were discussed  Screening is current  Counseling was given on ways to maintain a healthy weight  Counseling was given on tobacco cessation  Diabetes Screening/Counseling: The risks and benefits of screening were discussed  Screening is current  Counseling was given on ways to maintain a healthy diet  Colorectal Cancer Screening/Counseling: The risks and benefits of screening were discussed  Screening is current  Breast Cancer Screening/Counseling: The risks and benefits of screening were discussed Screening is current   Cervical Cancer Screening/Counseling: The risks and benefits of screening were discussed  Cervical cancer screening is current  Hepatitis C Screening/Counseling: Completed once in a lifetime screening   Influenza Immunization: Influenza recommended annually  Pneumococcal Immunization: Risk and benefits of immunization was discussed  Will hold off of pneumonia vaccination due to acute sickness today  Advance Directive Planning: Not complete  Paperwork and instructions were given regarding [employer] [facility] booklet  Patient Discussion: Plan discussed with the patient  Follow-up in 1 year for subsequent wellness visit was advised  Fall Prevention   AMBULATORY CARE:   Fall prevention  includes ways to make your home and other areas safer  It also includes ways you can move more carefully to prevent a fall  Health conditions that cause changes in your blood pressure, vision, or muscle strength and coordination may increase your risk for falls  Medicines may also increase your risk for falls if they make you dizzy, weak, or sleepy  Call 911 or have someone else call if:   · You have fallen and are unconscious  · You have fallen and cannot move part of your body  Contact your healthcare provider if:   · You have fallen and have pain or a headache      · You have questions or concerns about your condition or care  Fall prevention tips:   · Stand or sit up slowly  This may help you keep your balance and prevent falls  · Use assistive devices as directed  Your healthcare provider may suggest that you use a cane or walker to help you keep your balance  You may need to have grab bars put in your bathroom near the toilet or in the shower  · Wear shoes that fit well and have soles that   Wear shoes both inside and outside  Use slippers with good   Do not wear shoes with high heels  · Wear a personal alarm  This is a device that allows you to call 911 if you fall and need help  Ask your healthcare provider for more information  · Stay active  Exercise can help strengthen your muscles and improve your balance  Your healthcare provider may recommend water aerobics or walking  He or she may also recommend physical therapy to improve your coordination  Never start an exercise program without talking to your healthcare provider first      · Manage your medical conditions  Keep all appointments with your healthcare providers  Visit your eye doctor as directed  Home safety tips:   · Add items to prevent falls in the bathroom  Put nonslip strips on your bath or shower floor to prevent you from slipping  Use a bath mat if you do not have carpet in the bathroom  This will prevent you from falling when you step out of the bath or shower  Use a shower seat so you do not need to stand while you shower  Sit on the toilet or a chair in your bathroom to dry yourself and put on clothing  This will prevent you from losing your balance from drying or dressing yourself while you are standing  · Keep paths clear  Remove books, shoes, and other objects from walkways and stairs  Place cords for telephones and lamps out of the way so that you do not need to walk over them  Tape them down if you cannot move them  Remove small rugs   If you cannot remove a rug, secure it with double-sided tape  This will prevent you from tripping  · Install bright lights in your home  Use night lights to help light paths to the bathroom or kitchen  Always turn on the light before you start walking  · Keep items you use often on shelves within reach  Do not use a step stool to help you reach an item  · Paint or place reflective tape on the edges of your stairs  This will help you see the stairs better  Follow up with your healthcare provider as directed:  Write down your questions so you remember to ask them during your visits  © 2017 [facility] Information is for End User's use only and may not be sold, redistributed or otherwise used for commercial purposes  All illustrations and images included in CareNotes® are the copyrighted property of A D A M , Inc  or Richard Cano  The above information is an  only  It is not intended as medical advice for individual conditions or treatments  Talk to your doctor, nurse or pharmacist before following any medical regimen to see if it is safe and effective for you  Advance Directives   WHAT YOU NEED TO KNOW:   What are advance directives? Advance directives are legal documents that state your wishes and plans for medical care  These plans are made ahead of time in case you lose your ability to make decisions for yourself  Advance directives can apply to any medical decision, such as the treatments you want, and if you want to donate organs  What are the types of advance directives? There are many types of advance directives, and each state has rules about how to use them  You may choose a combination of any of the following:  · Living will: This is a written record of the treatment you want  You can also choose which treatments you do not want, which to limit, and which to stop at a certain time  This includes surgery, medicine, IV fluid, and tube feedings       · Durable power of  [facility]): This is a written record that states who you want to make healthcare choices for you when you are unable to make them for yourself  This person, called a proxy, is usually a family member or a friend  You may choose more than 1 proxy  · Do not resuscitate (DNR) order:  A DNR order is used in case your heart stops beating or you stop breathing  It is a request not to have certain forms of treatment, such as CPR  A DNR order may be included in other types of advance directives  · Medical directive: This covers the care that you want if you are in a coma, near death, or unable to make decisions for yourself  You can list the treatments you want for each condition  Treatment may include pain medicine, surgery, blood transfusions, dialysis, IV or tube feedings, and a ventilator (breathing machine)  · Values history: This document has questions about your views, beliefs, and how you feel and think about life  This information can help others choose the care that you would choose  Why are advance directives important? An advance directive helps you control your care  Although spoken wishes may be used, it is better to have your wishes written down  Spoken wishes can be misunderstood, or not followed  Treatments may be given even if you do not want them  An advance directive may make it easier for your family to make difficult choices about your care  How do I decide what to put in my advance directives? · Make informed decisions:  Make sure you fully understand treatments or care you may receive  Think about the benefits and problems your decisions could cause for you or your family  Talk to healthcare providers if you have concerns or questions before you write down your wishes  You may also want to talk with your [employer] or , or a    Check your state laws to make sure that what you put in your advance directive is legal      · Sign all forms:  Sign and date your advance directive when you have finished  You may also need 2 witnesses to sign the forms  Witnesses cannot be your doctor or his staff, your spouse, heirs or beneficiaries, people you owe money to, or your chosen proxy  Talk to your family, proxy, and healthcare providers about your advance directive  Give each person a copy, and keep one for yourself in a place you can get to easily  Do not keep it hidden or locked away  · Review and revise your plans: You can revise your advance directive at any time, as long as you are able to make decisions  Review your plan every year, and when there are changes in your life, or your health  When you make changes, let your family, proxy, and healthcare providers know  Give each a new copy  Where can I find more information? · [employer]  [address] , [place]  Phone: 2- [phone] - [phone]  Phone: 8- [phone]  Web Address: http://www  aafp org  · [address] [facility]  [address] [facility], [address] [facility] , [address] [place] [address]  Phone: [phone]  Phone: [phone]  Web Address: Erika borrero  [facility] AGREEMENT:   You have the right to help plan your care  To help with this plan, you must learn about your health condition and treatment options  You must also learn about advance directives and how they are used  Work with your healthcare providers to decide what care will be used to treat you  You always have the right to refuse treatment  The above information is an  only  It is not intended as medical advice for individual conditions or treatments  Talk to your doctor, nurse or pharmacist before following any medical regimen to see if it is safe and effective for you  © 2017 [employer] Information is for End User's use only and may not be sold, redistributed or otherwise used for commercial purposes   All illustrations and images included in CareNotes® are the copyrighted property of A D A M , Inc  or Richard Cano  Rhinosinusitis   AMBULATORY CARE:   Rhinosinusitis (RS)  is inflammation of your nose and sinuses  It commonly begins as a virus, often as a common cold  Viruses usually last 7 to 10 days and do not need treatment  When the virus does not get better on its own, you may have bacterial RS  This means that bacteria have begun to grow inside your sinuses  Acute RS lasts less than 4 weeks  Chronic RS lasts 12 weeks or more  Recurrent RS is when you have 4 or more episodes of RS in one year  Your signs and symptoms  may be worse when you lie on your back or try to sleep  You may have any of the following:  · Stuffy nose and reduced sense of smell     · Runny nose with thick yellow or green mucus     · Pressure or pain on your face or a headache     · Pain in your teeth or bad breath     · Ear pain or pressure     · Fever or cough     · Tiredness  Seek care immediately if:   · You have double vision or you cannot see  · You have a stiff neck, a fever, or a bad headache  · Your eyeball bulges out or you cannot move your eye  · Your eye and eyelid are red, swollen, and painful  · You cannot open your eye  · You are more sleepy than normal, or you notice changes in your ability to think, move, or talk  · You have swelling of your forehead or scalp  Contact your healthcare provider if:   · Your symptoms are worse or do not improve after 3 to 5 days of treatment  · You have questions or concerns about your condition or care  Treatment for rhinosinusitis  may include any of the following:  · Acetaminophen  decreases pain and fever  It is available without a doctor's order  Ask how much to take and how often to take it  Follow directions  Acetaminophen can cause liver damage if not taken correctly  · NSAIDs , such as ibuprofen, help decrease swelling, pain, and fever   This medicine is available with or without a doctor's order  NSAIDs can cause stomach bleeding or kidney problems in certain people  If you take blood thinner medicine, always ask your healthcare provider if NSAIDs are safe for you  Always read the medicine label and follow directions  · Nasal steroid sprays  decrease inflammation in your nose and sinuses  · Decongestants  reduce swelling and drain mucus in the nose and sinuses  They may help you breathe easier  · Antihistamines  dry mucus in the nose and relieve sneezing  · Antibiotics  treat a bacterial infection and may be needed if your symptoms do not improve or they get worse  · Take your medicine as directed  Contact your healthcare provider if you think your medicine is not helping or if you have side effects  Tell him or her if you are allergic to any medicine  Keep a list of the medicines, vitamins, and herbs you take  Include the amounts, and when and why you take them  Bring the list or the pill bottles to follow-up visits  Carry your medicine list with you in case of an emergency  Self-care:   · Rinse your sinuses  Use a sinus rinse device to rinse your nasal passages with a saline (salt water) solution  This will help thin the mucus in your nose and rinse away pollen and dirt  It will also help reduce swelling so you can breathe normally  Ask your healthcare provider how often to do this  · Breathe in steam   Heat a bowl of water until you see steam  Lean over the bowl and make a tent over your head with a large towel  Breathe deeply for about 20 minutes  Be careful not to get too close to the steam or burn yourself  Do this 3 times a day  You can also breathe deeply when you take a hot shower  · Sleep with your head elevated  Place an extra pillow under your head before you go to sleep to help your sinuses drain  · Drink liquids as directed    Ask your healthcare provider how much liquid to drink each day and which liquids are best for you  Liquids will thin the mucus in your nose and help it drain  Avoid drinks that contain alcohol or caffeine  · Do not smoke, and avoid secondhand smoke  Nicotine and other chemicals in cigarettes and cigars can make your symptoms worse  Ask your healthcare provider for information if you currently smoke and need help to quit  E-cigarettes or smokeless tobacco still contain nicotine  Talk to your healthcare provider before you use these products  Follow up with your healthcare provider as directed: Follow up if your symptoms are worse or not better after 3 to 5 days of treatment  Write down your questions so you remember to ask them during your visits  © 2017 [address] [employer] Information is for End User's use only and may not be sold, redistributed or otherwise used for commercial purposes  All illustrations and images included in CareNotes® are the copyrighted property of [employer]  or Richard Cano  The above information is an  only  It is not intended as medical advice for individual conditions or treatments  Talk to your doctor, nurse or pharmacist before following any medical regimen to see if it is safe and effective for you  Cigarette Smoking and Your Health   AMBULATORY CARE:   Risks to your health if you smoke:  Nicotine and other chemicals found in tobacco damage every cell in your body  Even if you are a light smoker, you have an increased risk for cancer, heart disease, and lung disease  If you are pregnant or have diabetes, smoking increases your risk for complications  Benefits to your health if you stop smoking:   · You decrease respiratory symptoms such as coughing, wheezing, and shortness of breath  · You reduce your risk for cancers of the lung, mouth, throat, kidney, bladder, pancreas, stomach, and cervix  If you already have cancer, you increase the benefits of chemotherapy   You also reduce your risk for cancer returning or a second cancer from developing  · You reduce your risk for heart disease, blood clots, heart attack, and stroke  · You reduce your risk for lung infections, and diseases such as pneumonia, asthma, chronic bronchitis, and emphysema  · Your circulation improves  More oxygen can be delivered to your body  If you have diabetes, you lower your risk for complications, such as kidney, artery, and eye diseases  You also lower your risk for nerve damage  Nerve damage can lead to amputations, poor vision, and blindness  · You improve your body's ability to heal and to fight infections  Benefits to the health of others if you stop smoking:  Tobacco is harmful to nonsmokers who breathe in your secondhand smoke  The following are ways the health of others around you may improve when you stop smoking:  · You lower the risks for lung cancer and heart disease in nonsmoking adults  · If you are pregnant, you lower the risk for miscarriage, early delivery, low birth weight, and stillbirth  You also lower your baby's risk for SIDS, obesity, developmental delay, and neurobehavioral problems, such as ADHD  · If you have children, you lower their risk for ear infections, colds, pneumonia, bronchitis, and asthma  For more information and support to stop smoking:   · Smokefree  gov  Phone: 9- 897 - 661-4605  Web Address: www [employer]  Follow up with your healthcare provider as directed:  Write down your questions so you remember to ask them during your visits  © 2017 [address] [employer] Information is for End User's use only and may not be sold, redistributed or otherwise used for commercial purposes  All illustrations and images included in CareNotes® are the copyrighted property of A [employer] A M , Inc  or Richard Cano  The above information is an  only  It is not intended as medical advice for individual conditions or treatments   Talk to your doctor, nurse or pharmacist before following any medical regimen to see if it is safe and effective for you

## 2018-07-25 NOTE — PROGRESS NOTES
INTERNAL MEDICINE FOLLOW-UP OFFICE VISIT  [facility] - [facility] FARZANEH CHEN    NAME: Og Brooks  AGE: 60 y o  SEX: female  : 1960     DATE: 2018     Assessment and Plan:     1  Acute sinusitis on chronic sinusitis    Referral given to see Dr Marleny Shepherd again  Take antibiotics as prescribed  Use flonase  Meds sent to pharmacy and patient education printed  - doxycycline monohydrate (MONODOX) 100 mg capsule; Take 1 capsule (100 mg total) by mouth 2 (two) times a day for 7 days  Dispense: 14 capsule; Refill: 0  - fluticasone (FLONASE) 50 mcg/act nasal spray; 1 spray into each nostril daily  Dispense: 16 g; Refill: 3  - Ambulatory Referral to Otolaryngology; Future    2  Centrilobular emphysema ([address] )    Patient is stable and should continue use of inhalers  Strongly advised to quit smoking  There is too much stress in her life to think about quitting  3  Anxiety    Change to extended release wellbutrin  New script sent to pharmacy  - buPROPion (WELLBUTRIN XL) 150 mg 24 hr tablet; Take 1 tablet (150 mg total) by mouth daily  Dispense: 30 tablet; Refill: 3    Return in about 4 months (around 2018) for Follow-up  Counseling:     · Medication Side Effects - Adverse side effects of medications were reviewed with the patient/guardian today: Yes  · Counseling was given regarding: Diagnostic results, Prognosis, Risks and benefits of tx options, Intructions for management, Patient and family education, Importance of tx compliance, Risk factor reductions and Impressions  · Barriers to treatment include: lack of transportation, lack of support, financial limitations      Chief Complaint:     Chief Complaint   Patient presents with    Sinus Problem     sinus HA for about 3 days ago      History of Present Illness:     Increased sinus congestion and nasal drainage  Started off clear and then got thicker  Post-nasal drip with sore throat  Ears feel full intermittently  Saw Dr Anahi Bang in past, but kept on missing appointments  Continues to smoke and has underlying COPD  Thinks the mucus she is coughing up is coming from her sinuses  No worsening SOB or wheezing  Denies fever or chills  Increased headaches  Has had a lot of things go wrong in her life lately  Anxiety has been increasing  The following portions of the patient's history were reviewed and updated as appropriate: allergies, current medications, past family history, past medical history, past social history, past surgical history and problem list      Review of Systems:     Review of Systems   Constitutional: Negative for activity change, appetite change and fatigue  HENT: Positive for congestion, ear pain, postnasal drip, rhinorrhea and sore throat  Respiratory: Positive for cough  Negative for apnea, chest tightness, shortness of breath and wheezing  Cardiovascular: Negative for chest pain, palpitations and leg swelling  Gastrointestinal: Negative for abdominal distention, abdominal pain, blood in stool, constipation, diarrhea, nausea and vomiting  Musculoskeletal: Positive for arthralgias, back pain and joint swelling  Negative for gait problem and myalgias  Skin: Negative for rash and wound  Neurological: Positive for headaches  Negative for dizziness, tremors, seizures, syncope, facial asymmetry, speech difficulty, weakness, light-headedness and numbness  Psychiatric/Behavioral: Negative for behavioral problems, confusion, hallucinations, sleep disturbance and suicidal ideas  The patient is not nervous/anxious         Problem List:     Patient Active Problem List   Diagnosis    Anxiety    Cardiomyopathy ([facility] )    Atherosclerotic heart disease of native coronary artery without angina pectoris    COPD (chronic obstructive pulmonary disease) ([facility] )    Essential hypertension    GERD (gastroesophageal reflux disease)    Hypercholesteremia    Nicotine dependence    Rheumatoid arthritis with positive rheumatoid factor (HCC)    Chronic migraine      Objective:     /80 (BP Location: Left arm, Patient Position: Sitting, Cuff Size: Standard)   Pulse 73   Ht 5' 7 5" (1 715 m) Comment: w/ shoe denied off  Wt 82 3 kg (181 lb 6 4 oz) Comment: w/ shoe denied off  SpO2 97%   BMI 27 99 kg/m²     Physical Exam   Constitutional: She is oriented to person, place, and time  She appears well-developed and well-nourished  No distress  HENT:   Right Ear: Tympanic membrane, external ear and ear canal normal    Left Ear: Tympanic membrane, external ear and ear canal normal    Nose: Mucosal edema and rhinorrhea present  Mouth/Throat: Oropharynx is clear and moist    Eyes: Conjunctivae are normal  Right eye exhibits no discharge  Left eye exhibits no discharge  No scleral icterus  Neck: Neck supple  No JVD present  No thyromegaly present  Cardiovascular: Normal rate, regular rhythm, normal heart sounds and intact distal pulses  Exam reveals no gallop and no friction rub  No murmur heard  Pulmonary/Chest: Effort normal and breath sounds normal  No respiratory distress  She has no wheezes  She has no rales  She exhibits no tenderness  Abdominal: Soft  Bowel sounds are normal  She exhibits no distension and no mass  There is no tenderness  There is no rebound and no guarding  Musculoskeletal: Normal range of motion  She exhibits no edema  Lymphadenopathy:     She has no cervical adenopathy  Neurological: She is alert and oriented to person, place, and time  Skin: Skin is warm and dry  She is not diaphoretic  Psychiatric: She has a normal mood and affect  Her behavior is normal    Vitals reviewed  PHQ-9  Negative for depression with PHQ2 score of 0       Current Medications:     Current Outpatient Prescriptions   Medication Sig Dispense Refill    Adalimumab (HUMIRA PEN) 40 MG/0 8ML PNKT Inject under the skin      albuterol (VENTOLIN HFA) 90 mcg/act inhaler Inhale 2 puffs 2 (two) times a day        aspirin 81 MG tablet Take 1 tablet by mouth daily      atorvastatin (LIPITOR) 40 mg tablet Take 1 tablet by mouth      bethanechol (URECHOLINE) 25 mg tablet Take 1 tablet by mouth 2 (two) times a day        butalbital-acetaminophen-caffeine (FIORICET,ESGIC) -40 mg per tablet Take 1 tablet by mouth 2 (two) times a day as needed for migraine 40 tablet 1    clonazePAM (KlonoPIN) 0 5 mg tablet Take 1 tablet by mouth as needed        DULoxetine (CYMBALTA) 60 mg delayed release capsule Take 90 mg by mouth daily        eszopiclone (LUNESTA) 2 mg tablet TAKE 1 TABLET (2 MG) BY MOUTH DAILY IMMEDIATELY BEFORE BEDTIME  0    gabapentin (NEURONTIN) 300 mg capsule Take 300 mg by mouth daily        hydroxychloroquine (PLAQUENIL) 200 mg tablet Take 1 tablet by mouth daily      lidocaine (XYLOCAINE) 5 % ointment apply locally FOUR TIMES DAILY  2    lisinopril (ZESTRIL) 40 mg tablet Take 1 tablet by mouth daily      meclizine (ANTIVERT) 25 mg tablet Take 1 tablet (25 mg total) by mouth 2 (two) times a day 60 tablet 3    metroNIDAZOLE (FLAGYL) 500 mg tablet Take 500 mg by mouth every 6 (six) hours  0    metroNIDAZOLE (METROGEL) 1 % gel Apply topically as needed        mometasone (NASONEX) 50 mcg/act nasal spray into each nostril      montelukast (SINGULAIR) 10 mg tablet Take 1 tablet by mouth daily      pantoprazole (PROTONIX) 40 mg tablet Take 1 tablet by mouth 2 (two) times a day        primidone (MYSOLINE) 50 mg tablet Take 1 tablet (50 mg total) by mouth 2 (two) times a day 180 tablet 1    topiramate (TOPAMAX) 50 MG tablet TAKE 1 TABLET BY MOUTH AT BEDTIME 30 tablet 3    buPROPion (WELLBUTRIN XL) 150 mg 24 hr tablet Take 1 tablet (150 mg total) by mouth daily 30 tablet 3    cephalexin (KEFLEX) 500 mg capsule TAKE ONE CAPSULE 4 TIMES A DAY  0    doxycycline monohydrate (MONODOX) 100 mg capsule Take 1 capsule (100 mg total) by mouth 2 (two) times a day for 7 days 14 capsule 0    fluticasone (FLONASE) 50 mcg/act nasal spray 1 spray into each nostril daily 16 g 3    meloxicam (MOBIC) 7 5 mg tablet TAKE 1 TABLET(S) EVERY DAY BY ORAL ROUTE WITH MEALS FOR 21 DAYS   0    nicotine (NICODERM CQ) 14 mg/24hr TD 24 hr patch APPLY 1 PATCH DAILY AND REMOVE AT BEDTIME AS DIRECTED  0    orphenadrine (NORFLEX) 100 mg tablet TAKE 1 TABLET (100 MG TOTAL) BY MOUTH DAILY AT BEDTIME FOR 30 DAYS 30 tablet 1     No current facility-administered medications for this visit          Onofre Vail DO  [facility] [address] [place] [address]

## 2018-07-25 NOTE — PROGRESS NOTES
Medicare Annual Wellness Visit  [facility] - [facility] [address] [place] [address]  NAME: Radha Parikh SEX: female : 1960    HPI:  Radha Parikh is a 60 y o  female who presents to clinic today for initial AWV      PATIENT CARE TEAM:  Patient Care Team:  Danni Iqbal DO as PCP - General (Internal Medicine)  MD Bria Balderas MD Gloriann Bonine, MD (Cardiology)  Taz Bradford PA-C (Physician Assistant)  Diamante Curtis MD (Pulmonary Disease)  Kareem Myrick MD (Psychiatry)  Rick Villatoro MD (Pain Medicine)    PAST MEDICAL HISTORY:  Past Medical History:   Diagnosis Date    Anxiety     Cancer of skin of abdomen     Cardiomyopathy [facility])     Carpal tunnel syndrome     Cervical herniated disc     Chronic sinusitis     Compression fracture [facility] lumbar spine[facility]     COPD (chronic obstructive pulmonary disease) [facility] )     Depression     GERD (gastroesophageal reflux disease)     Hepatitis C antibody test positive     No detectable HCV RNA    Hypercholesteremia     Hypertension     Liver hemangioma     Lumbar disc herniation     Nasal turbinate hypertrophy     Rheumatoid arthritis ([address] )     RLS (restless legs syndrome)     Tension headache      PAST SURGICAL HISTORY:  Past Surgical History:   Procedure Laterality Date    CHOLECYSTECTOMY      2016    COLONOSCOPY  2011    Dr Nury Hidalgo Left 2014    FOOT SURGERY Right 2013    2nd toe    FOOT SURGERY Bilateral 2017    HAND SURGERY  2016    Right index finger and knuckle repaired    KNEE SURGERY Right 07/10/2012    LARYNGOSCOPY  05/15/2012    direct laryngoscopy with stripping of vocal cords    LARYNGOSCOPY  2012    direct laryngoscopy with stripping of vocal cords    MOHS SURGERY  2015    Trunk/Abdomen    NASAL SEPTUM SURGERY  2013    NEUROPLASTY / TRANSPOSITION MEDIAN NERVE AT CARPAL TUNNEL Right 2015    ROTATOR CUFF REPAIR Left 10/23/2012    SHOULDER ARTHROPLASTY      total shoulder replacement-right-9/22/2016    SINUS SURGERY  05/16/2017    Dr E[facility] Dupont at [facility]     PROBLEM LIST:  Patient Active Problem List   Diagnosis    Anxiety    [facility]    Atherosclerotic heart disease of native coronary artery without angina pectoris    COPD (chronic obstructive pulmonary disease) ([facility]    Essential hypertension    GERD (gastroesophageal reflux disease)    Hypercholesteremia    Nicotine dependence    Rheumatoid arthritis with positive rheumatoid factor (HCC)    Chronic migraine     FAMILY HISTORY:  Family History   Problem Relation Age of Onset    Heart disease Mother         cardiac pacemaker    Lymphoma Father     Cancer Sister     Stroke Maternal Grandfather     Cancer Family     Other Family         bone issues     SOCIAL HISTORY:  Manfred Vee  reports that she has been smoking Cigarettes  She started smoking about 26 years ago  She has a 13 00 pack-year smoking history  She has never used smokeless tobacco  She reports that she drinks alcohol  She reports that she does not use drugs      ALLERGIES:  Allergies   Allergen Reactions    Pollen Extract Itching and Sneezing    Clindamycin Rash     CURRENT MEDICATIONS:  Current Outpatient Prescriptions   Medication Sig Dispense Refill    Adalimumab (HUMIRA PEN) 40 MG/0 8ML PNKT Inject under the skin      albuterol (VENTOLIN HFA) 90 mcg/act inhaler Inhale 2 puffs 2 (two) times a day        aspirin 81 MG tablet Take 1 tablet by mouth daily      atorvastatin (LIPITOR) 40 mg tablet Take 1 tablet by mouth      bethanechol (URECHOLINE) 25 mg tablet Take 1 tablet by mouth 2 (two) times a day        buPROPion (WELLBUTRIN) 100 mg tablet Take 1 tablet (100 mg total) by mouth daily 90 tablet 3    butalbital-acetaminophen-caffeine (FIORICET,ESGIC) -40 mg per tablet Take 1 tablet by mouth 2 (two) times a day as needed for migraine 40 tablet 1    clonazePAM (KlonoPIN) 0 5 mg tablet Take 1 tablet by mouth as needed        DULoxetine (CYMBALTA) 60 mg delayed release capsule Take 90 mg by mouth daily        eszopiclone (LUNESTA) 2 mg tablet TAKE 1 TABLET (2 MG) BY MOUTH DAILY IMMEDIATELY BEFORE BEDTIME  0    gabapentin (NEURONTIN) 300 mg capsule Take 300 mg by mouth daily        hydroxychloroquine (PLAQUENIL) 200 mg tablet Take 1 tablet by mouth daily      lidocaine (XYLOCAINE) 5 % ointment apply locally FOUR TIMES DAILY  2    lisinopril (ZESTRIL) 40 mg tablet Take 1 tablet by mouth daily      meclizine (ANTIVERT) 25 mg tablet Take 1 tablet (25 mg total) by mouth 2 (two) times a day 60 tablet 3    metroNIDAZOLE (FLAGYL) 500 mg tablet Take 500 mg by mouth every 6 (six) hours  0    metroNIDAZOLE (METROGEL) 1 % gel Apply topically as needed        mometasone (NASONEX) 50 mcg/act nasal spray into each nostril      montelukast (SINGULAIR) 10 mg tablet Take 1 tablet by mouth daily      pantoprazole (PROTONIX) 40 mg tablet Take 1 tablet by mouth 2 (two) times a day        primidone (MYSOLINE) 50 mg tablet Take 1 tablet (50 mg total) by mouth 2 (two) times a day 180 tablet 1    topiramate (TOPAMAX) 50 MG tablet TAKE 1 TABLET BY MOUTH AT BEDTIME 30 tablet 3    cephalexin (KEFLEX) 500 mg capsule TAKE ONE CAPSULE 4 TIMES A DAY  0    meloxicam (MOBIC) 7 5 mg tablet TAKE 1 TABLET(S) EVERY DAY BY ORAL ROUTE WITH MEALS FOR 21 DAYS   0    nicotine (NICODERM CQ) 14 mg/24hr TD 24 hr patch APPLY 1 PATCH DAILY AND REMOVE AT BEDTIME AS DIRECTED  0    orphenadrine (NORFLEX) 100 mg tablet TAKE 1 TABLET (100 MG TOTAL) BY MOUTH DAILY AT BEDTIME FOR 30 DAYS 30 tablet 1     No current facility-administered medications for this visit        IMMUNIZATIONS:  Immunization History   Administered Date(s) Administered    Influenza 12/16/2014, 02/22/2017     HEALTH MAINTENANCE:  Health Maintenance   Topic Date Due   [facility] Annual Wellness Visit (AWV)  1960    PAP SMEAR  1960    DTaP,Tdap,and Td Vaccines (1 - Tdap) 02/13/1980    HIV SCREENING  06/30/2018    INFLUENZA VACCINE  09/01/2018    CRC Screening: Colonoscopy  02/11/2026    Hepatitis C Screening  Completed     PATIENT HEALTH RISK ASSESSMENT (HRA):  AWV Clinical     ISAR:   Previous hospitalizations?:  Yes   How many hospitalizations have you had in the last year?:  1-2   Additional Comments:  compression fracture L1 spine / low sugar        Once in a Lifetime Medicare Screening:   EKG performed?:  Yes    AAA screening performed? (if performed, please add date to Health Maintenance):  No       Medicare Screening Tests and Risk Assessment:   AAA Risk Assessment    None Indicated:  Yes    Osteoporosis Risk Assessment     Female:  No   :  No :  No   Age over 48:  Yes Low body weight (<127lbs):  No   Tobacco use:  No Alcohol use:  No   Low calcium diet:  No PMHX of fractures:  Yes   FHX of fractures:  Yes    HIV Risk Assessment    None indicated:  Yes        Drug and Alcohol Use:   Tobacco use    Cigarettes:  current smoker    Smokeless:  never used smokeless tobacco    Tobacco use duration    Packs per day:  1    Tobacco Cessation Readiness    Alcohol use    Alcohol use:  occasional use    Amount of alcohol consumed:  2 cups    Concern about alcohol use:  No Tolerance to alcohol:  Yes   Attempts to cut down:  No Guilt about use:  No   Patient concern:  No Annoyed by criticism:  No   Morning drinking:  No Family concern:  No   Alcohol Treatment Readiness   Readiness to quit:  not ready    Illicit Drug Use    Drug use:  former user Frequency:  daily   Drug type:  marijuana use, no sedative use   readiness to quit:  not ready        Diet & Exercise:   Diet   What is your diet?:  Limited junk food   How many servings a day of the following:   Fruits and Vegetables:  1-2 Meat:  1-2   Whole Grains:  0 Simple Carbs:  0   Dairy:  1 Soda:  3   Coffee:  2 Tea:  0   Exercise    Do you currently exercise?: currently not exercising       Cognitive Impairment Screening:   Cognitive Impairment Screening    Do you have difficulty learning or retaining new information?:  No Do you have difficulty handling new tasks?:  No   Do you have difficulty with reasoning?:  No Do you have difficulty with spatial ability and orientation?:  No   Do you have difficulty with language?:  No Do you have difficulty with behavior?:  No       Functional Ability/Level of Safety:   Hearing    Hearing difficulties:  No Bilateral:  slightly decreased   Left:  slightly decreased Right:  slightly decreased   Hearing aid:  No    Hearing Impairment Assessment    Hearing status:  No impairment   Do your family members ever complain that you turn on the radio or Klatcher  too loudly?:  No Do you find that other people have to repeat themselves when talking to you?:  Yes   Do you have difficulty hearing while talking on the phone?:  No Has anyone ever told you that you are speaking too loudly when talking with them?:  Yes   Do you have trouble hearing the doorbell or phone ringing?:  No Do you have difficulty hearing such that you feel frustrated talking to people?:  No   Do you feel sad because you cannot hear well?:  No Do you feel inconvienced due to your hearing problem?:  No   Do you think you would be a happier person if you could hear better?:  No Would you be willing to go for a hearing aid fitting if suggested?:  No   Current Activities    Status:  limited IADL's, limited ADL's   Help needed with the folllowing:    Using the phone:  No Transportation:  Yes   Shopping:  No Preparing Meals:  No   Doing Housework:  Yes Doing Laundry:  No   Managing Medications:  No Managing Money:  No   ADL    Feeding:  Independant   Oral hygiene and Facial grooming:  Independant   Bathing:  Independant   Upper Body Dressing:  Independant   Lower Body Dressing:  Independant   Toileting:  Independant   Bed Mobility:  Independant   Fall Risk   Have you fallen in the last 12 months?:  Yes Are you unsteady on your feet?:  No    Are you taking any medications that may cause fatigue or dizziness?:  No   Do you have any chronic conditions that may contribute to a fall?:  Arthritis Do you rush to the bathroom potentially risking a fall?:  No   Injury History   Polypharmacy:  No Antidepressant Use:  No   Sedative Use:  No Antihypertensive Use:  No   Previous Fall:  No Alcohol Use:  Yes   Deconditioning:  No Visual Impairment:  No   Cogitive Impairment:  No Mmobility Impairment:  No   Postural Hypotension:  No Urinary Incontinence:  No       Home Safety:   Are there hazards in your environment?:  No   If you fell, would you need help to get back up from the ground?:  Yes Do you have problems or concerns getting in/out of a bed, chair, tub, or toilet?:  No   Do you feel unsteady when walking?:  No Is your activity limited by pain?:  Yes   Do you have handrails and grab-bars in the home?:  Yes Are emergency numbers kept by the phone and regularly updated?:  Yes   Are you and/or family members aware of the dangers of smoking in bed?:  Yes Are firearms stored securely?:  No   Do you have working smoke alarms and fire extinguisher?:  No    Have you left the stove on unsupervised?:  No    Home Safety Risk Factors   Unfamilar with surroundings:  No Uneven floors:  No   Stairs or handrail saftey risk:  No Loose rugs:  No   Household clutter:  No Poor household lighting:  No   No grab bars in bathroom:  No Further evaluation needed:  No       Advanced Directives:   Advanced Directives    Living Will:  No Durable POA for healthcare:  No   Advanced directive:  No    Patient's End of Life Decisions        Urinary Incontinence:   Do you have urinary incontinence?:  No Do you have incomplete emptying?:  No   Do you urinate frequently?:  No Do you have urinary urgency?:  No   Do you have urinary hesitancy?:  No Do you have dysuria (painful and/or difficult urination)?:  No   Do you have nocturia (waking up to urinate)?:  No Do you strain when urinating (have to push to urinate)?:  No   Do you have a weak stream when urinating?:  No Do you have intermittent streaming when urinating?:  No   Do you dribble urine after finishing?:  No    Do you have vaginal pressure?:  No Do you have vaginal dryness?:  No       Glaucoma:           VITALS:  /80 (BP Location: Left arm, Patient Position: Sitting, Cuff Size: Standard)   Pulse 73   Ht 5' 7 5" (1 715 m) Comment: w/ shoe denied off  Wt 82 3 kg (181 lb 6 4 oz) Comment: w/ shoe denied off  SpO2 97%   BMI 27 99 kg/m²     ASSESSMENT/PLAN:  1  Medicare annual wellness visit, initial       MEDICARE WELLNESS COUNSELING/DISCUSSION:  Cardiovascular screening and prevention: The risks and benefits of screening were discussed  Screening is current  Counseling was given on ways to maintain a healthy weight  Counseling was given on tobacco cessation  Diabetes Screening/Counseling: The risks and benefits of screening were discussed  Screening is current  Counseling was given on ways to maintain a healthy diet  Colorectal Cancer Screening/Counseling: The risks and benefits of screening were discussed  Screening is current  Breast Cancer Screening/Counseling: The risks and benefits of screening were discussed Screening is current   Cervical Cancer Screening/Counseling: The risks and benefits of screening were discussed  Cervical cancer screening is current  Hepatitis C Screening/Counseling: Completed once in a lifetime screening   Influenza Immunization: Influenza recommended annually  Pneumococcal Immunization: Risk and benefits of immunization was discussed  Will hold off of pneumonia vaccination due to acute sickness today  Advance Directive Planning: Not complete  Paperwork and instructions were given regarding [facility] booklet  Patient Discussion: Plan discussed with the patient  Follow-up in 1 year for subsequent wellness visit was advised         Cindy Segovia [facility] [id number]

## 2018-08-08 DIAGNOSIS — R42 DIZZINESS: ICD-10-CM

## 2018-08-08 RX ORDER — MECLIZINE HYDROCHLORIDE 25 MG/1
25 TABLET ORAL EVERY 12 HOURS PRN
Qty: 60 TABLET | Refills: 3 | Status: SHIPPED | OUTPATIENT
Start: 2018-08-08 | End: 2019-01-01 | Stop reason: SDUPTHER

## 2018-08-09 DIAGNOSIS — G44.229 CHRONIC TENSION-TYPE HEADACHE, NOT INTRACTABLE: ICD-10-CM

## 2018-08-09 RX ORDER — ORPHENADRINE CITRATE 100 MG/1
100 TABLET, EXTENDED RELEASE ORAL
Qty: 30 TABLET | Refills: 1 | Status: SHIPPED | OUTPATIENT
Start: 2018-08-09 | End: 2018-10-01 | Stop reason: SDUPTHER

## 2018-08-21 ENCOUNTER — TELEPHONE (OUTPATIENT)
Dept: INTERNAL MEDICINE CLINIC | Facility: CLINIC | Age: 58
End: 2018-08-21

## 2018-08-23 ENCOUNTER — TRANSCRIBE ORDERS (OUTPATIENT)
Dept: NEUROLOGY | Facility: CLINIC | Age: 58
End: 2018-08-23

## 2018-08-23 DIAGNOSIS — M25.531 PAIN IN RIGHT WRIST: Primary | ICD-10-CM

## 2018-09-05 ENCOUNTER — CONSULT (OUTPATIENT)
Dept: INTERNAL MEDICINE CLINIC | Facility: CLINIC | Age: 58
End: 2018-09-05
Payer: COMMERCIAL

## 2018-09-05 VITALS
TEMPERATURE: 99.5 F | OXYGEN SATURATION: 96 % | DIASTOLIC BLOOD PRESSURE: 76 MMHG | WEIGHT: 177 LBS | BODY MASS INDEX: 27.78 KG/M2 | HEART RATE: 97 BPM | HEIGHT: 67 IN | SYSTOLIC BLOOD PRESSURE: 122 MMHG

## 2018-09-05 DIAGNOSIS — M79.672 LEFT FOOT PAIN: ICD-10-CM

## 2018-09-05 DIAGNOSIS — Z01.818 PRE-OPERATIVE CLEARANCE: Primary | ICD-10-CM

## 2018-09-05 DIAGNOSIS — Z23 ENCOUNTER FOR IMMUNIZATION: ICD-10-CM

## 2018-09-05 PROCEDURE — 99213 OFFICE O/P EST LOW 20 MIN: CPT | Performed by: INTERNAL MEDICINE

## 2018-09-05 PROCEDURE — 90682 RIV4 VACC RECOMBINANT DNA IM: CPT

## 2018-09-05 NOTE — PROGRESS NOTES
INTERNAL MEDICINE FOLLOW-UP OFFICE VISIT  [facility] - [facility] [address] [place] [address]    NAME: Vivek De La Cruz  AGE: 60 y o  SEX: female  : 1960     DATE: 2018     Assessment and Plan:     1  Pre-operative clearance  2  Left foot pain    Patient is moderate cardiac risk for low risk surgery  Patient can proceed with surgery  Reviewed medications and prior testing  Will await pre-operative testing results, but don't suspect any significant abnormalities  3  Encounter for immunization    Flu shot given today  - influenza vaccine, [id number], quadrivalent, recombinant, PF, 0 5 mL, for patients 18 yr+ (FLUBLOK)     Pre-operative recommendations:     1  Patient is cleared for surgery pending any significant abnormalities on pre-operative testing  I will review pre-operative testing when it is available  Chief Complaint:     Chief Complaint   Patient presents with    Pre-op Exam     Left Foot Surgery      History of Present Illness:     Patient presents for pre-op eval  Has not done pre-op testing yet  Denies any complaints other than left foot pain  Previous history of CAD with cardiomyopathy in past  Was put on lisinopril and EF improved  No cardiac problems since then  Blood pressure has been well controlled  If having screw removed from left foot  Surgeon is Dr Uzair Guzmán  Has underlying COPD and rheumatoid arthritis as well  No worsening cough or SOB  Does get chronic SOB on exertion  The following portions of the patient's history were reviewed and updated as appropriate: allergies, current medications, past family history, past medical history, past social history, past surgical history and problem list      Review of Systems:     Review of Systems   Constitutional: Negative  Respiratory: Positive for shortness of breath (on exertion)  Negative for apnea, cough, choking, chest tightness, wheezing and stridor  Cardiovascular: Negative      Gastrointestinal: Negative  Musculoskeletal: Positive for arthralgias  Negative for back pain and gait problem  Problem List:     Patient Active Problem List   Diagnosis    Anxiety    Cardiomyopathy ([facility] [address] )    Atherosclerotic heart disease of native coronary artery without angina pectoris    COPD [facility]ch[facility] disease) [facility])    Essential hypertension    GERD (gastroesophageal reflux disease)    Hypercholesteremia    Nicotine dependence    Rheumatoid arthritis with positive rheumatoid factor ([facility])    Chronic migraine      Objective:     /76 (BP Location: Left arm, Patient Position: Sitting)   Pulse 97   Temp 99 5 °F (37 5 °C) (Tympanic)   Ht 5' 7 25" (1 708 m)   Wt 80 3 kg (177 lb)   SpO2 96%   BMI 27 52 kg/m²     Physical Exam   Constitutional: She is oriented to person, place, and time  She appears well-developed and well-nourished  No distress  Cardiovascular: Normal rate, regular rhythm and normal heart sounds  No murmur heard  Pulmonary/Chest: Effort normal and breath sounds normal  No respiratory distress  Abdominal: Soft  Bowel sounds are normal  She exhibits no distension  Musculoskeletal: Normal range of motion  She exhibits no edema  Neurological: She is alert and oriented to person, place, and time  Skin: Skin is warm and dry  She is not diaphoretic  Psychiatric: She has a normal mood and affect   Her behavior is normal       Current Medications:     Current Outpatient Prescriptions   Medication Sig Dispense Refill    Adalimumab (HUMIRA PEN) 40 MG/0 8ML PNKT Inject under the skin      albuterol (VENTOLIN HFA) 90 mcg/act inhaler Inhale 2 puffs 2 (two) times a day        aspirin 81 MG tablet Take 1 tablet by mouth daily      atorvastatin (LIPITOR) 40 mg tablet Take 1 tablet by mouth      bethanechol (URECHOLINE) 25 mg tablet Take 1 tablet by mouth 2 (two) times a day        buPROPion (WELLBUTRIN XL) 150 mg 24 hr tablet Take 1 tablet (150 mg total) by mouth daily 30 tablet 3    butalbital-acetaminophen-caffeine (FIORICET,ESGIC) -40 mg per tablet Take 1 tablet by mouth 2 (two) times a day as needed for migraine 40 tablet 1    clonazePAM (KlonoPIN) 0 5 mg tablet Take 1 tablet by mouth as needed        DULoxetine (CYMBALTA) 60 mg delayed release capsule Take 90 mg by mouth daily        eszopiclone (LUNESTA) 2 mg tablet TAKE 1 TABLET (2 MG) BY MOUTH DAILY IMMEDIATELY BEFORE BEDTIME  0    fluticasone (FLONASE) 50 mcg/act nasal spray 1 spray into each nostril daily 16 g 3    gabapentin (NEURONTIN) 300 mg capsule Take 300 mg by mouth daily        hydroxychloroquine (PLAQUENIL) 200 mg tablet Take 1 tablet by mouth daily      lidocaine (XYLOCAINE) 5 % ointment apply locally FOUR TIMES DAILY  2    lisinopril (ZESTRIL) 40 mg tablet Take 1 tablet by mouth daily      meclizine (ANTIVERT) 25 mg tablet Take 1 tablet (25 mg total) by mouth every 12 (twelve) hours as needed for dizziness 60 tablet 3    metroNIDAZOLE (FLAGYL) 500 mg tablet Take 500 mg by mouth every 6 (six) hours  0    metroNIDAZOLE (METROGEL) 1 % gel Apply topically as needed        mometasone (NASONEX) 50 mcg/act nasal spray into each nostril      montelukast (SINGULAIR) 10 mg tablet Take 1 tablet by mouth daily      orphenadrine (NORFLEX) 100 mg tablet TAKE 1 TABLET (100 MG TOTAL) BY MOUTH DAILY AT BEDTIME FOR 30 DAYS 30 tablet 1    pantoprazole (PROTONIX) 40 mg tablet Take 1 tablet by mouth 2 (two) times a day        primidone (MYSOLINE) 50 mg tablet Take 1 tablet (50 mg total) by mouth 2 (two) times a day 180 tablet 1    topiramate (TOPAMAX) 50 MG tablet TAKE 1 TABLET BY MOUTH AT BEDTIME 30 tablet 3    nicotine (NICODERM CQ) 14 mg/24hr TD 24 hr patch APPLY 1 PATCH DAILY AND REMOVE AT BEDTIME AS DIRECTED  0     No current facility-administered medications for this visit          Jey Cevallos DO  [facility]

## 2018-09-12 ENCOUNTER — TELEPHONE (OUTPATIENT)
Dept: NEUROLOGY | Facility: CLINIC | Age: 58
End: 2018-09-12

## 2018-10-01 DIAGNOSIS — G44.229 CHRONIC TENSION-TYPE HEADACHE, NOT INTRACTABLE: ICD-10-CM

## 2018-10-01 RX ORDER — ORPHENADRINE CITRATE 100 MG/1
100 TABLET, EXTENDED RELEASE ORAL
Qty: 30 TABLET | Refills: 1 | Status: SHIPPED | OUTPATIENT
Start: 2018-10-01 | End: 2018-01-01 | Stop reason: ALTCHOICE

## 2018-10-02 DIAGNOSIS — G43.709 CHRONIC MIGRAINE WITHOUT AURA WITHOUT STATUS MIGRAINOSUS, NOT INTRACTABLE: ICD-10-CM

## 2018-10-02 RX ORDER — TOPIRAMATE 50 MG/1
TABLET, FILM COATED ORAL
Qty: 30 TABLET | Refills: 3 | Status: SHIPPED | OUTPATIENT
Start: 2018-10-02 | End: 2018-01-01 | Stop reason: SDUPTHER

## 2018-10-26 PROBLEM — G43.709 CHRONIC MIGRAINE WITHOUT AURA WITHOUT STATUS MIGRAINOSUS, NOT INTRACTABLE: Status: ACTIVE | Noted: 2018-01-01

## 2018-10-26 NOTE — PROGRESS NOTES
Progress Note - Neurology   Maged Dickey 60 y o  female MRN: [id number]  Unit/Bed#:  Encounter: [id number]      Subjective:   Patient is here for a follow-up visit with a history of chronic migraine headaches which have generally been under control with the help of topiramate 50 mg at bedtime and uses Fioricet only on a p r n  Basis  She was seen by as over a year ago and is followed up with pain management for chronic neck and low back pain, and also followed up with her rheumatologist on a regular basis  Patient is occasions were also reviewed and she is on several medications in the form of clonazepam gabapentin orphenadrine as well as Lunesta at bedtime besides medications prescribed by a psychiatrist   Patient's headaches have been under control and she is advised to discontinue orphenadrine at this time  She continues to have low back pain and is followed up with pain management  ROS:   Review of Systems   Constitutional: Positive for fatigue  Negative for appetite change and fever  HENT: Positive for tinnitus  Negative for hearing loss, trouble swallowing and voice change  Eyes: Positive for visual disturbance  Negative for photophobia and pain  Respiratory: Negative  Negative for shortness of breath  Cardiovascular: Positive for chest pain  Negative for palpitations  Gastrointestinal: Negative  Negative for nausea and vomiting  Endocrine: Positive for cold intolerance  Negative for heat intolerance  Genitourinary: Negative  Negative for dysuria, frequency and urgency  Musculoskeletal: Positive for arthralgias (right arm pain) and back pain  Negative for myalgias and neck pain  Skin: Negative  Negative for rash  Neurological: Positive for weakness, light-headedness and numbness  Negative for dizziness, tremors, seizures, syncope, facial asymmetry, speech difficulty and headaches  Hematological: Negative  Does not bruise/bleed easily  Psychiatric/Behavioral: Negative  Negative for confusion, hallucinations and sleep disturbance  All other systems reviewed and are negative  Vitals:   Vitals:    10/26/18 1315   BP: 122/78   Pulse: 78   ,Body mass index is 26 02 kg/m²      MEDS:      Current Outpatient Prescriptions:     Adalimumab (HUMIRA PEN) 40 MG/0 8ML PNKT, Inject under the skin, Disp: , Rfl:     albuterol (VENTOLIN HFA) 90 mcg/act inhaler, Inhale 2 puffs 2 (two) times a day  , Disp: , Rfl:     aspirin 81 MG tablet, Take 1 tablet by mouth daily, Disp: , Rfl:     atorvastatin (LIPITOR) 40 mg tablet, Take 1 tablet by mouth, Disp: , Rfl:     bethanechol (URECHOLINE) 25 mg tablet, Take 1 tablet by mouth 2 (two) times a day  , Disp: , Rfl:     buPROPion (WELLBUTRIN XL) 150 mg 24 hr tablet, Take 1 tablet (150 mg total) by mouth daily, Disp: 30 tablet, Rfl: 3    butalbital-acetaminophen-caffeine (FIORICET,ESGIC) -40 mg per tablet, Take 1 tablet by mouth 2 (two) times a day as needed for migraine, Disp: 40 tablet, Rfl: 1    Calcium Carb-Cholecalciferol (CALCIUM 600+D3) 600-200 MG-UNIT TABS, Take 2 tablets by mouth daily, Disp: , Rfl:     clonazePAM (KlonoPIN) 0 5 mg tablet, Take 1 tablet by mouth as needed  , Disp: , Rfl:     DULoxetine (CYMBALTA) 30 mg delayed release capsule, Take 30 mg by mouth daily, Disp: , Rfl:     DULoxetine (CYMBALTA) 60 mg delayed release capsule, Take 60 mg by mouth daily  , Disp: , Rfl:     eszopiclone (LUNESTA) 2 mg tablet, TAKE 1 TABLET (2 MG) BY MOUTH DAILY IMMEDIATELY BEFORE BEDTIME, Disp: , Rfl: 0    fluticasone (FLONASE) 50 mcg/act nasal spray, 1 spray into each nostril daily, Disp: 16 g, Rfl: 3    gabapentin (NEURONTIN) 300 mg capsule, Take 300 mg by mouth daily  , Disp: , Rfl:     Lactobacillus (PROBIOTIC ACIDOPHILUS PO), Take 1 capsule by mouth daily, Disp: , Rfl:     lidocaine (XYLOCAINE) 5 % ointment, apply locally DAILY prn, Disp: , Rfl: 2    lisinopril (ZESTRIL) 40 mg tablet, Take 1 tablet by mouth daily, Disp: , Rfl:     meclizine (ANTIVERT) 25 mg tablet, Take 1 tablet (25 mg total) by mouth every 12 (twelve) hours as needed for dizziness, Disp: 60 tablet, Rfl: 3    metroNIDAZOLE (METROGEL) 1 % gel, Apply topically as needed  , Disp: , Rfl:     mometasone (NASONEX) 50 mcg/act nasal spray, into each nostril, Disp: , Rfl:     montelukast (SINGULAIR) 10 mg tablet, Take 1 tablet by mouth daily, Disp: , Rfl:     orphenadrine (NORFLEX) 100 mg tablet, TAKE 1 TABLET (100 MG TOTAL) BY MOUTH DAILY AT BEDTIME FOR 30 DAYS, Disp: 30 tablet, Rfl: 1    pantoprazole (PROTONIX) 40 mg tablet, Take 1 tablet by mouth 2 (two) times a day  , Disp: , Rfl:     primidone (MYSOLINE) 50 mg tablet, Take 1 tablet (50 mg total) by mouth 2 (two) times a day, Disp: 180 tablet, Rfl: 1    topiramate (TOPAMAX) 50 MG tablet, TAKE 1 TABLET BY MOUTH AT BEDTIME, Disp: 30 tablet, Rfl: 3  :    Physical Exam:  General appearance: alert, appears stated age and cooperative  Head: Normocephalic, without obvious abnormality, atraumatic    Neurologic:  On examination she has evidence of cervical and lumbosacral tenderness with no new deficits noted on motor and sensory exam, limited range of motion noted in the right shoulder, no evidence of any nystagmus or dysmetria was noted  Her gait is normal based  Lab Results: I have personally reviewed pertinent reports  Imaging Studies: I have personally reviewed pertinent reports  Assessment:  1  Chronic migraine headaches  Plan:  Patient is advised to discontinue orphenadrine at this time, will continue with topiramate 50 mg at bedtime and Fioricet which she uses judiciously on a p r n  Basis  She will now return back to see me in 6 months  10/26/2018,1:22 PM    Dictation voice to text software has been used in the creation of this document  Please consider this in light of any contextual or grammatical errors

## 2018-11-30 PROBLEM — M81.0 OSTEOPOROSIS OF FOREARM: Chronic | Status: ACTIVE | Noted: 2018-10-03

## 2018-11-30 PROBLEM — M81.0 OSTEOPOROSIS: Status: ACTIVE | Noted: 2018-01-01

## 2018-11-30 NOTE — PROGRESS NOTES
INTERNAL MEDICINE FOLLOW-UP OFFICE VISIT  [facility] - [facility] [address] [place] [address]    NAME: Deangelo Ochoa  AGE: 60 y o  SEX: female  : 1960     DATE: 2018     Assessment and Plan:     Assessment  1  Centrilobular emphysema ([facility] [address] )    2  Essential hypertension    3  Rheumatoid arthritis involving multiple sites with positive rheumatoid factor ([facility] [address] )    4  Osteoporosis of forearm    5  Cigarette nicotine dependence with nicotine-induced disorder    6  Encounter for immunization      Plan    Patient's chronic medical conditions are stable  Needs follow-up lung cancer screening in 2019  Continue fosamax as prescribed for osteoporosis  Her COPD is stable and highly recommend she quit smoking  Discussed vaccinations  PCV13 will be given today  Blood pressure is well controlled  Check labs before next visit  Routine exercise  Orders Placed This Encounter   Procedures    PNEUMOCOCCAL CONJUGATE VACCINE 13-VALENT GREATER THAN 6 MONTHS    Comprehensive metabolic panel    Lipid panel       Return in about 4 months (around 3/30/2019) for Follow-up  Chief Complaint:     Chief Complaint   Patient presents with    Follow-up     4 month      History of Present Illness:     Patient presents for routine follow-up  Recently saw rheumatologist - doing well on Humira  Was found to have osteoporosis of forearm and started on fosamax  Also had recent lung cancer screening and needs follow-up in 1 year for small bilateral pulmonary nodules  She continues to smoke and does not want to quit  She admits to pain in her back and hands   Recent sed rate was normal        The following portions of the patient's history were reviewed and updated as appropriate: allergies, current medications, past family history, past medical history, past social history, past surgical history and problem list      Review of Systems:     Review of Systems   Constitutional: Negative for activity change, appetite change and fatigue  Respiratory: Negative for apnea, cough, chest tightness, shortness of breath and wheezing  Cardiovascular: Negative for chest pain, palpitations and leg swelling  Gastrointestinal: Negative for abdominal distention, abdominal pain, blood in stool, constipation, diarrhea, nausea and vomiting  Musculoskeletal: Positive for arthralgias and back pain  Negative for gait problem, joint swelling and myalgias  Skin: Negative for rash and wound  Neurological: Negative for dizziness, tremors, seizures, syncope, facial asymmetry, speech difficulty, weakness, light-headedness, numbness and headaches  Psychiatric/Behavioral: Negative for behavioral problems, confusion, hallucinations, sleep disturbance and suicidal ideas  The patient is not nervous/anxious  Problem List:     Patient Active Problem List   Diagnosis    Anxiety    Cardiomyopathy ([facility] )    Atherosclerotic heart disease of native coronary artery without angina pectoris    COPD (chronic obstructive pulmonary disease) ([facility] )    Essential hypertension    GERD (gastroesophageal reflux disease)    Hypercholesteremia    Nicotine dependence    Rheumatoid arthritis with positive rheumatoid factor (HCC)    Chronic migraine    Chronic migraine without aura without status migrainosus, not intractable    Osteoporosis of forearm      Objective:     /88 (BP Location: Left arm, Patient Position: Sitting, Cuff Size: Standard)   Pulse 76   Ht 5' 7 25" (1 708 m)   Wt 77 3 kg (170 lb 6 4 oz)   SpO2 99%   BMI 26 49 kg/m²     Physical Exam   Constitutional: She is oriented to person, place, and time  She appears well-developed and well-nourished  No distress  Eyes: Conjunctivae are normal  Right eye exhibits no discharge  Left eye exhibits no discharge  No scleral icterus  Neck: Neck supple  No JVD present  No thyromegaly present  Cardiovascular: Normal rate, regular rhythm and normal heart sounds      No murmur heard  Pulmonary/Chest: Effort normal  No respiratory distress  She has decreased breath sounds  She has no wheezes  She has no rales  She exhibits no tenderness  Abdominal: Soft  Bowel sounds are normal  She exhibits no distension and no mass  There is no tenderness  There is no rebound and no guarding  No hernia  Musculoskeletal: She exhibits no edema  Lymphadenopathy:     She has no cervical adenopathy  Neurological: She is alert and oriented to person, place, and time  Skin: Skin is warm and dry  She is not diaphoretic  Psychiatric: She has a normal mood and affect  Her behavior is normal    Vitals reviewed  Pertinent Laboratory/Diagnostic Studies:    Laboratory Results: I have personally reviewed the pertinent laboratory results/reports     Radiology/Other Diagnostic Testing Results: I have personally reviewed pertinent reports        Anup Marshall DO  [facility] Pending [facility] to [facility]

## 2018-11-30 NOTE — PATIENT INSTRUCTIONS

## 2019-01-01 ENCOUNTER — HOSPITAL ENCOUNTER (OUTPATIENT)
Dept: MAMMOGRAPHY | Facility: CLINIC | Age: 59
Discharge: HOME | End: 2019-03-06
Payer: COMMERCIAL

## 2019-01-01 ENCOUNTER — APPOINTMENT (INPATIENT)
Dept: RADIOLOGY | Facility: HOSPITAL | Age: 59
DRG: 871 | End: 2019-01-01
Payer: COMMERCIAL

## 2019-01-01 ENCOUNTER — TELEPHONE (OUTPATIENT)
Dept: INTERNAL MEDICINE CLINIC | Facility: CLINIC | Age: 59
End: 2019-01-01

## 2019-01-01 ENCOUNTER — OFFICE VISIT (OUTPATIENT)
Dept: INTERNAL MEDICINE CLINIC | Facility: CLINIC | Age: 59
End: 2019-01-01
Payer: COMMERCIAL

## 2019-01-01 ENCOUNTER — OFFICE VISIT (OUTPATIENT)
Dept: NEUROLOGY | Facility: CLINIC | Age: 59
End: 2019-01-01
Payer: COMMERCIAL

## 2019-01-01 ENCOUNTER — TELEPHONE (OUTPATIENT)
Dept: OTHER | Facility: OTHER | Age: 59
End: 2019-01-01

## 2019-01-01 ENCOUNTER — TELEPHONE (OUTPATIENT)
Dept: NEUROLOGY | Facility: CLINIC | Age: 59
End: 2019-01-01

## 2019-01-01 ENCOUNTER — HOSPITAL ENCOUNTER (OUTPATIENT)
Dept: MRI IMAGING | Facility: HOSPITAL | Age: 59
Discharge: HOME | End: 2019-09-20
Attending: PSYCHIATRY & NEUROLOGY
Payer: COMMERCIAL

## 2019-01-01 ENCOUNTER — PATIENT OUTREACH (OUTPATIENT)
Dept: INTERNAL MEDICINE CLINIC | Facility: CLINIC | Age: 59
End: 2019-01-01

## 2019-01-01 ENCOUNTER — OFFICE VISIT (OUTPATIENT)
Dept: OBGYN CLINIC | Facility: CLINIC | Age: 59
End: 2019-01-01
Payer: COMMERCIAL

## 2019-01-01 ENCOUNTER — HOSPITAL ENCOUNTER (INPATIENT)
Facility: HOSPITAL | Age: 59
LOS: 1 days | DRG: 871 | End: 2019-10-21
Attending: EMERGENCY MEDICINE | Admitting: ANESTHESIOLOGY
Payer: COMMERCIAL

## 2019-01-01 ENCOUNTER — HOSPITAL ENCOUNTER (OUTPATIENT)
Facility: HOSPITAL | Age: 59
Setting detail: OUTPATIENT SURGERY
Discharge: HOME | End: 2019-05-09
Attending: INTERNAL MEDICINE | Admitting: INTERNAL MEDICINE
Payer: COMMERCIAL

## 2019-01-01 ENCOUNTER — TELEPHONE (OUTPATIENT)
Dept: GASTROENTEROLOGY | Facility: CLINIC | Age: 59
End: 2019-01-01

## 2019-01-01 ENCOUNTER — OFFICE VISIT (OUTPATIENT)
Dept: GASTROENTEROLOGY | Facility: CLINIC | Age: 59
End: 2019-01-01
Payer: COMMERCIAL

## 2019-01-01 ENCOUNTER — APPOINTMENT (INPATIENT)
Dept: NON INVASIVE DIAGNOSTICS | Facility: HOSPITAL | Age: 59
DRG: 871 | End: 2019-01-01
Payer: COMMERCIAL

## 2019-01-01 ENCOUNTER — APPOINTMENT (EMERGENCY)
Dept: CT IMAGING | Facility: HOSPITAL | Age: 59
DRG: 871 | End: 2019-01-01
Payer: COMMERCIAL

## 2019-01-01 ENCOUNTER — TELEPHONE (OUTPATIENT)
Dept: CCU | Facility: HOSPITAL | Age: 59
End: 2019-01-01

## 2019-01-01 ENCOUNTER — APPOINTMENT (OUTPATIENT)
Dept: RADIOLOGY | Facility: CLINIC | Age: 59
End: 2019-01-01
Payer: COMMERCIAL

## 2019-01-01 ENCOUNTER — ANESTHESIA (OUTPATIENT)
Dept: PERIOP | Facility: HOSPITAL | Age: 59
End: 2019-01-01
Payer: COMMERCIAL

## 2019-01-01 ENCOUNTER — ANESTHESIA EVENT (OUTPATIENT)
Dept: PERIOP | Facility: HOSPITAL | Age: 59
End: 2019-01-01
Payer: COMMERCIAL

## 2019-01-01 ENCOUNTER — HOSPITAL ENCOUNTER (EMERGENCY)
Facility: HOSPITAL | Age: 59
Discharge: HOME | End: 2019-02-19
Attending: EMERGENCY MEDICINE | Admitting: EMERGENCY MEDICINE
Payer: COMMERCIAL

## 2019-01-01 VITALS
OXYGEN SATURATION: 98 % | HEIGHT: 67 IN | HEART RATE: 77 BPM | BODY MASS INDEX: 27.28 KG/M2 | DIASTOLIC BLOOD PRESSURE: 84 MMHG | WEIGHT: 173.8 LBS | SYSTOLIC BLOOD PRESSURE: 140 MMHG

## 2019-01-01 VITALS
BODY MASS INDEX: 26.34 KG/M2 | DIASTOLIC BLOOD PRESSURE: 70 MMHG | HEART RATE: 84 BPM | SYSTOLIC BLOOD PRESSURE: 104 MMHG | HEIGHT: 67 IN | TEMPERATURE: 98.9 F | RESPIRATION RATE: 12 BRPM | WEIGHT: 167.8 LBS

## 2019-01-01 VITALS
HEART RATE: 87 BPM | BODY MASS INDEX: 27.28 KG/M2 | OXYGEN SATURATION: 97 % | HEIGHT: 67 IN | TEMPERATURE: 98.1 F | SYSTOLIC BLOOD PRESSURE: 140 MMHG | DIASTOLIC BLOOD PRESSURE: 92 MMHG | WEIGHT: 173.8 LBS

## 2019-01-01 VITALS
OXYGEN SATURATION: 99 % | HEART RATE: 74 BPM | TEMPERATURE: 98.4 F | SYSTOLIC BLOOD PRESSURE: 144 MMHG | RESPIRATION RATE: 18 BRPM | DIASTOLIC BLOOD PRESSURE: 73 MMHG | WEIGHT: 165 LBS | HEIGHT: 67 IN | BODY MASS INDEX: 25.9 KG/M2

## 2019-01-01 VITALS
HEART RATE: 93 BPM | BODY MASS INDEX: 26.25 KG/M2 | SYSTOLIC BLOOD PRESSURE: 118 MMHG | DIASTOLIC BLOOD PRESSURE: 82 MMHG | WEIGHT: 167.6 LBS

## 2019-01-01 VITALS
HEIGHT: 67 IN | HEART RATE: 71 BPM | SYSTOLIC BLOOD PRESSURE: 109 MMHG | DIASTOLIC BLOOD PRESSURE: 74 MMHG | BODY MASS INDEX: 26.62 KG/M2 | WEIGHT: 169.6 LBS

## 2019-01-01 VITALS
HEART RATE: 75 BPM | DIASTOLIC BLOOD PRESSURE: 76 MMHG | HEIGHT: 67 IN | BODY MASS INDEX: 26.12 KG/M2 | RESPIRATION RATE: 18 BRPM | WEIGHT: 166.4 LBS | SYSTOLIC BLOOD PRESSURE: 120 MMHG

## 2019-01-01 VITALS
SYSTOLIC BLOOD PRESSURE: 120 MMHG | OXYGEN SATURATION: 97 % | WEIGHT: 169 LBS | HEART RATE: 88 BPM | DIASTOLIC BLOOD PRESSURE: 80 MMHG | BODY MASS INDEX: 26.47 KG/M2

## 2019-01-01 VITALS
HEART RATE: 92 BPM | WEIGHT: 174.2 LBS | SYSTOLIC BLOOD PRESSURE: 112 MMHG | HEIGHT: 66 IN | OXYGEN SATURATION: 97 % | DIASTOLIC BLOOD PRESSURE: 68 MMHG | TEMPERATURE: 98.4 F | BODY MASS INDEX: 28 KG/M2

## 2019-01-01 VITALS
RESPIRATION RATE: 19 BRPM | WEIGHT: 172.18 LBS | BODY MASS INDEX: 27.02 KG/M2 | TEMPERATURE: 97.8 F | DIASTOLIC BLOOD PRESSURE: 80 MMHG | SYSTOLIC BLOOD PRESSURE: 150 MMHG | HEART RATE: 71 BPM | HEIGHT: 67 IN | OXYGEN SATURATION: 99 %

## 2019-01-01 VITALS
HEIGHT: 67 IN | SYSTOLIC BLOOD PRESSURE: 114 MMHG | DIASTOLIC BLOOD PRESSURE: 83 MMHG | BODY MASS INDEX: 27.44 KG/M2 | HEART RATE: 90 BPM | WEIGHT: 174.8 LBS

## 2019-01-01 VITALS
HEIGHT: 67 IN | BODY MASS INDEX: 27.15 KG/M2 | WEIGHT: 173 LBS | DIASTOLIC BLOOD PRESSURE: 82 MMHG | SYSTOLIC BLOOD PRESSURE: 130 MMHG | HEART RATE: 82 BPM

## 2019-01-01 VITALS — BODY MASS INDEX: 25.43 KG/M2 | WEIGHT: 162 LBS | HEIGHT: 67 IN

## 2019-01-01 DIAGNOSIS — F43.23 ADJUSTMENT DISORDER WITH MIXED ANXIETY AND DEPRESSED MOOD: Primary | ICD-10-CM

## 2019-01-01 DIAGNOSIS — S71.109A MULTIPLE OPEN WOUNDS OF THIGH: ICD-10-CM

## 2019-01-01 DIAGNOSIS — G44.229 CHRONIC TENSION-TYPE HEADACHE, NOT INTRACTABLE: ICD-10-CM

## 2019-01-01 DIAGNOSIS — I10 ESSENTIAL HYPERTENSION: Chronic | ICD-10-CM

## 2019-01-01 DIAGNOSIS — M25.552 PAIN OF BOTH HIP JOINTS: ICD-10-CM

## 2019-01-01 DIAGNOSIS — G43.709 CHRONIC MIGRAINE WITHOUT AURA WITHOUT STATUS MIGRAINOSUS, NOT INTRACTABLE: ICD-10-CM

## 2019-01-01 DIAGNOSIS — R42 DIZZINESS: ICD-10-CM

## 2019-01-01 DIAGNOSIS — R13.19 ESOPHAGEAL DYSPHAGIA: Primary | ICD-10-CM

## 2019-01-01 DIAGNOSIS — R13.10 DYSPHAGIA: ICD-10-CM

## 2019-01-01 DIAGNOSIS — M25.551 PAIN OF BOTH HIP JOINTS: ICD-10-CM

## 2019-01-01 DIAGNOSIS — J43.2 CENTRILOBULAR EMPHYSEMA (HCC): Chronic | ICD-10-CM

## 2019-01-01 DIAGNOSIS — E55.9 VITAMIN D DEFICIENCY: Primary | ICD-10-CM

## 2019-01-01 DIAGNOSIS — G43.709 CHRONIC MIGRAINE WITHOUT AURA WITHOUT STATUS MIGRAINOSUS, NOT INTRACTABLE: Primary | ICD-10-CM

## 2019-01-01 DIAGNOSIS — Z59.00 HOMELESSNESS: ICD-10-CM

## 2019-01-01 DIAGNOSIS — Z71.89 COMPLEX CARE COORDINATION: Primary | ICD-10-CM

## 2019-01-01 DIAGNOSIS — E78.00 HYPERCHOLESTEREMIA: Chronic | ICD-10-CM

## 2019-01-01 DIAGNOSIS — G44.89 OTHER HEADACHE SYNDROME: Primary | ICD-10-CM

## 2019-01-01 DIAGNOSIS — M76.892 TENDINITIS INVOLVING LEFT HIP ABDUCTORS: ICD-10-CM

## 2019-01-01 DIAGNOSIS — J96.01 ACUTE RESPIRATORY FAILURE WITH HYPOXIA AND HYPERCAPNIA (HCC): ICD-10-CM

## 2019-01-01 DIAGNOSIS — R57.0 CARDIOGENIC SHOCK (HCC): Primary | ICD-10-CM

## 2019-01-01 DIAGNOSIS — I42.9 CARDIOMYOPATHY, UNSPECIFIED TYPE (HCC): Primary | Chronic | ICD-10-CM

## 2019-01-01 DIAGNOSIS — G93.40 ACUTE ENCEPHALOPATHY: ICD-10-CM

## 2019-01-01 DIAGNOSIS — R60.0 PEDAL EDEMA: Primary | ICD-10-CM

## 2019-01-01 DIAGNOSIS — Z00.00 MEDICARE ANNUAL WELLNESS VISIT, SUBSEQUENT: Primary | ICD-10-CM

## 2019-01-01 DIAGNOSIS — R25.1 TREMOR: ICD-10-CM

## 2019-01-01 DIAGNOSIS — J96.02 ACUTE RESPIRATORY FAILURE WITH HYPOXIA AND HYPERCAPNIA (HCC): ICD-10-CM

## 2019-01-01 DIAGNOSIS — R29.898 WEAKNESS OF BOTH HIPS: Primary | ICD-10-CM

## 2019-01-01 DIAGNOSIS — R57.9 SHOCK (HCC): ICD-10-CM

## 2019-01-01 DIAGNOSIS — N17.9 ACUTE KIDNEY INJURY (HCC): ICD-10-CM

## 2019-01-01 DIAGNOSIS — Z12.31 ENCOUNTER FOR SCREENING MAMMOGRAM FOR BREAST CANCER: ICD-10-CM

## 2019-01-01 DIAGNOSIS — Z00.8 ENCOUNTER FOR PSYCHOLOGICAL EVALUATION: Primary | ICD-10-CM

## 2019-01-01 DIAGNOSIS — E87.2 HIGH ANION GAP METABOLIC ACIDOSIS: ICD-10-CM

## 2019-01-01 DIAGNOSIS — G44.89 OTHER HEADACHE SYNDROME: ICD-10-CM

## 2019-01-01 DIAGNOSIS — M76.891 TENDINITIS INVOLVING RIGHT HIP ABDUCTORS: ICD-10-CM

## 2019-01-01 DIAGNOSIS — J40 BRONCHITIS: Primary | ICD-10-CM

## 2019-01-01 DIAGNOSIS — M70.61 GREATER TROCHANTERIC BURSITIS OF RIGHT HIP: Primary | ICD-10-CM

## 2019-01-01 DIAGNOSIS — T68.XXXA HYPOTHERMIA, INITIAL ENCOUNTER: ICD-10-CM

## 2019-01-01 DIAGNOSIS — M18.12 ARTHRITIS OF CARPOMETACARPAL (CMC) JOINT OF LEFT THUMB: Primary | ICD-10-CM

## 2019-01-01 DIAGNOSIS — F41.9 ANXIETY: ICD-10-CM

## 2019-01-01 DIAGNOSIS — I25.10 ATHEROSCLEROSIS OF NATIVE CORONARY ARTERY OF NATIVE HEART WITHOUT ANGINA PECTORIS: Chronic | ICD-10-CM

## 2019-01-01 DIAGNOSIS — M05.79 RHEUMATOID ARTHRITIS INVOLVING MULTIPLE SITES WITH POSITIVE RHEUMATOID FACTOR (HCC): Chronic | ICD-10-CM

## 2019-01-01 DIAGNOSIS — M65.332 TRIGGER FINGER, LEFT MIDDLE FINGER: ICD-10-CM

## 2019-01-01 DIAGNOSIS — K08.9 POOR DENTITION: Primary | ICD-10-CM

## 2019-01-01 LAB
ALBUMIN SERPL BCP-MCNC: 2.2 G/DL (ref 3.5–5)
ALBUMIN SERPL BCP-MCNC: 2.2 G/DL (ref 3.5–5)
ALP SERPL-CCNC: 73 U/L (ref 46–116)
ALP SERPL-CCNC: 76 U/L (ref 46–116)
ALT SERPL W P-5'-P-CCNC: 392 U/L (ref 12–78)
ALT SERPL W P-5'-P-CCNC: 40 U/L (ref 12–78)
AMPHETAMINES SERPL QL SCN: POSITIVE
AMPHETAMINES SERPL QL SCN: POSITIVE
ANION GAP SERPL CALCULATED.3IONS-SCNC: 21 MMOL/L (ref 4–13)
ANION GAP SERPL CALCULATED.3IONS-SCNC: 22 MMOL/L (ref 4–13)
ANION GAP SERPL CALCULATED.3IONS-SCNC: 25 MMOL/L (ref 4–13)
ANION GAP SERPL CALCULATED.3IONS-SCNC: 27 MMOL/L (ref 4–13)
ANION GAP SERPL CALCULATED.3IONS-SCNC: 28 MMOL/L (ref 4–13)
ANION GAP SERPL CALCULATED.3IONS-SCNC: 30 MMOL/L (ref 4–13)
ANION GAP SERPL CALCULATED.3IONS-SCNC: 31 MMOL/L (ref 4–13)
ANION GAP SERPL CALCULATED.3IONS-SCNC: 33 MMOL/L (ref 4–13)
ANION GAP SERPL CALCULATED.3IONS-SCNC: 33 MMOL/L (ref 4–13)
APAP SERPL-MCNC: 4.6 UG/ML (ref 10–20)
APAP SERPL-MCNC: 5.5 UG/ML (ref 10–20)
ARTERIAL PATENCY WRIST A: NO
AST SERPL W P-5'-P-CCNC: 621 U/L (ref 5–45)
AST SERPL W P-5'-P-CCNC: 85 U/L (ref 5–45)
ATRIAL RATE: 91 BPM
BACTERIA UR QL AUTO: ABNORMAL /HPF
BARBITURATES UR QL: POSITIVE
BARBITURATES UR QL: POSITIVE
BASE EX.OXY STD BLDV CALC-SCNC: 91.8 % (ref 60–80)
BASE EXCESS BLDA CALC-SCNC: -11.2 MMOL/L
BASE EXCESS BLDA CALC-SCNC: -14.3 MMOL/L
BASE EXCESS BLDA CALC-SCNC: -15.4 MMOL/L
BASE EXCESS BLDA CALC-SCNC: -16.5 MMOL/L
BASE EXCESS BLDA CALC-SCNC: -16.7 MMOL/L
BASE EXCESS BLDA CALC-SCNC: -17 MMOL/L (ref -2–3)
BASE EXCESS BLDA CALC-SCNC: -20.5 MMOL/L
BASE EXCESS BLDA CALC-SCNC: -24.9 MMOL/L
BASE EXCESS BLDA CALC-SCNC: -9.6 MMOL/L
BASE EXCESS BLDV CALC-SCNC: -32.6 MMOL/L
BASOPHILS # BLD AUTO: 0.03 THOUSANDS/ΜL (ref 0–0.1)
BASOPHILS # BLD MANUAL: 0.03 THOUSAND/UL (ref 0–0.1)
BASOPHILS NFR BLD AUTO: 0 % (ref 0–1)
BASOPHILS NFR MAR MANUAL: 1 % (ref 0–1)
BENZODIAZ UR QL: NEGATIVE
BENZODIAZ UR QL: POSITIVE
BILIRUB DIRECT SERPL-MCNC: 0.23 MG/DL (ref 0–0.2)
BILIRUB SERPL-MCNC: 0.7 MG/DL (ref 0.2–1)
BILIRUB SERPL-MCNC: 0.8 MG/DL (ref 0.2–1)
BILIRUB UR QL STRIP: ABNORMAL
BODY TEMPERATURE: 88.2 DEGREES FEHRENHEIT
BODY TEMPERATURE: 91 DEGREES FEHRENHEIT
BODY TEMPERATURE: 95.9 DEGREES FEHRENHEIT
BODY TEMPERATURE: 97.3 DEGREES FEHRENHEIT
BODY TEMPERATURE: 98.1 DEGREES FEHRENHEIT
BODY TEMPERATURE: 99.7 DEGREES FEHRENHEIT
BUN BLD-MCNC: 47 MG/DL (ref 5–25)
BUN SERPL-MCNC: 21 MG/DL (ref 5–25)
BUN SERPL-MCNC: 22 MG/DL (ref 5–25)
BUN SERPL-MCNC: 25 MG/DL (ref 5–25)
BUN SERPL-MCNC: 31 MG/DL (ref 5–25)
BUN SERPL-MCNC: 37 MG/DL (ref 5–25)
BUN SERPL-MCNC: 41 MG/DL (ref 5–25)
BUN SERPL-MCNC: 46 MG/DL (ref 5–25)
BUN SERPL-MCNC: 46 MG/DL (ref 5–25)
BUN SERPL-MCNC: 50 MG/DL (ref 5–25)
CA-I BLD-SCNC: 0.82 MMOL/L (ref 1.12–1.32)
CA-I BLD-SCNC: 0.96 MMOL/L (ref 1.12–1.32)
CA-I BLD-SCNC: 0.96 MMOL/L (ref 1.12–1.32)
CA-I BLD-SCNC: 0.97 MMOL/L (ref 1.12–1.32)
CA-I BLD-SCNC: 0.99 MMOL/L (ref 1.12–1.32)
CA-I BLD-SCNC: 1.01 MMOL/L (ref 1.12–1.32)
CA-I BLD-SCNC: 1.02 MMOL/L (ref 1.12–1.32)
CA-I BLD-SCNC: 1.12 MMOL/L (ref 1.12–1.32)
CA-I BLD-SCNC: 1.19 MMOL/L (ref 1.12–1.32)
CA-I BLD-SCNC: 1.28 MMOL/L (ref 1.12–1.32)
CALCIUM SERPL-MCNC: 10.2 MG/DL (ref 8.3–10.1)
CALCIUM SERPL-MCNC: 5.8 MG/DL (ref 8.3–10.1)
CALCIUM SERPL-MCNC: 6.8 MG/DL (ref 8.3–10.1)
CALCIUM SERPL-MCNC: 6.8 MG/DL (ref 8.3–10.1)
CALCIUM SERPL-MCNC: 7.1 MG/DL (ref 8.3–10.1)
CALCIUM SERPL-MCNC: 7.2 MG/DL (ref 8.3–10.1)
CALCIUM SERPL-MCNC: 7.5 MG/DL (ref 8.3–10.1)
CALCIUM SERPL-MCNC: 7.6 MG/DL (ref 8.3–10.1)
CALCIUM SERPL-MCNC: 9.3 MG/DL (ref 8.3–10.1)
CHLORIDE BLD-SCNC: 120 MMOL/L (ref 100–108)
CHLORIDE SERPL-SCNC: 103 MMOL/L (ref 100–108)
CHLORIDE SERPL-SCNC: 104 MMOL/L (ref 100–108)
CHLORIDE SERPL-SCNC: 108 MMOL/L (ref 100–108)
CHLORIDE SERPL-SCNC: 111 MMOL/L (ref 100–108)
CHLORIDE SERPL-SCNC: 112 MMOL/L (ref 100–108)
CHLORIDE SERPL-SCNC: 112 MMOL/L (ref 100–108)
CHLORIDE SERPL-SCNC: 114 MMOL/L (ref 100–108)
CHOLEST SERPL-MCNC: 116 MG/DL (ref 50–200)
CK MB SERPL-MCNC: 2.4 % (ref 0–2.5)
CK MB SERPL-MCNC: 29.4 NG/ML (ref 0–5)
CK MB SERPL-MCNC: 3.6 % (ref 0–2.5)
CK MB SERPL-MCNC: 5 NG/ML (ref 0–5)
CK SERPL-CCNC: 208 U/L (ref 26–192)
CK SERPL-CCNC: 816 U/L (ref 26–192)
CLARITY UR: ABNORMAL
CO2 SERPL-SCNC: 10 MMOL/L (ref 21–32)
CO2 SERPL-SCNC: 12 MMOL/L (ref 21–32)
CO2 SERPL-SCNC: 13 MMOL/L (ref 21–32)
CO2 SERPL-SCNC: 15 MMOL/L (ref 21–32)
CO2 SERPL-SCNC: 15 MMOL/L (ref 21–32)
CO2 SERPL-SCNC: 16 MMOL/L (ref 21–32)
CO2 SERPL-SCNC: 16 MMOL/L (ref 21–32)
CO2 SERPL-SCNC: 17 MMOL/L (ref 21–32)
CO2 SERPL-SCNC: 22 MMOL/L (ref 21–32)
COARSE GRAN CASTS URNS QL MICRO: ABNORMAL /LPF
COCAINE UR QL: NEGATIVE
COCAINE UR QL: NEGATIVE
COLOR UR: ABNORMAL
CREAT BLD-MCNC: 1.9 MG/DL (ref 0.6–1.3)
CREAT SERPL-MCNC: 1.85 MG/DL (ref 0.6–1.3)
CREAT SERPL-MCNC: 1.92 MG/DL (ref 0.6–1.3)
CREAT SERPL-MCNC: 2 MG/DL (ref 0.6–1.3)
CREAT SERPL-MCNC: 2.01 MG/DL (ref 0.6–1.3)
CREAT SERPL-MCNC: 2.2 MG/DL (ref 0.6–1.3)
CREAT SERPL-MCNC: 2.3 MG/DL (ref 0.6–1.3)
CREAT SERPL-MCNC: 2.35 MG/DL (ref 0.6–1.3)
CREAT SERPL-MCNC: 2.61 MG/DL (ref 0.6–1.3)
CREAT SERPL-MCNC: 2.65 MG/DL (ref 0.6–1.3)
DIGOXIN SERPL-MCNC: 0.2 NG/ML (ref 0.8–2)
EOSINOPHIL # BLD AUTO: 0.08 THOUSAND/ΜL (ref 0–0.61)
EOSINOPHIL # BLD MANUAL: 0 THOUSAND/UL (ref 0–0.4)
EOSINOPHIL NFR BLD AUTO: 1 % (ref 0–6)
EOSINOPHIL NFR BLD MANUAL: 0 % (ref 0–6)
ERYTHROCYTE [DISTWIDTH] IN BLOOD BY AUTOMATED COUNT: 14.1 % (ref 11.6–15.1)
ERYTHROCYTE [DISTWIDTH] IN BLOOD BY AUTOMATED COUNT: 14.3 % (ref 11.6–15.1)
ERYTHROCYTE [DISTWIDTH] IN BLOOD BY AUTOMATED COUNT: 14.5 % (ref 11.6–15.1)
ERYTHROCYTE [DISTWIDTH] IN BLOOD BY AUTOMATED COUNT: 14.6 % (ref 11.6–15.1)
EST. AVERAGE GLUCOSE BLD GHB EST-MCNC: 111 MG/DL
ETHANOL EXG-MCNC: 0 MG/DL
ETHANOL SERPL-MCNC: <3 MG/DL (ref 0–3)
GFR SERPL CREATININE-BSD FRML MDRD: 10 ML/MIN/1.73SQ M
GFR SERPL CREATININE-BSD FRML MDRD: 10 ML/MIN/1.73SQ M
GFR SERPL CREATININE-BSD FRML MDRD: 12 ML/MIN/1.73SQ M
GFR SERPL CREATININE-BSD FRML MDRD: 12 ML/MIN/1.73SQ M
GFR SERPL CREATININE-BSD FRML MDRD: 13 ML/MIN/1.73SQ M
GFR SERPL CREATININE-BSD FRML MDRD: 14 ML/MIN/1.73SQ M
GFR SERPL CREATININE-BSD FRML MDRD: 14 ML/MIN/1.73SQ M
GFR SERPL CREATININE-BSD FRML MDRD: 15 ML/MIN/1.73SQ M
GFR SERPL CREATININE-BSD FRML MDRD: 15 ML/MIN/1.73SQ M
GFR SERPL CREATININE-BSD FRML MDRD: 16 ML/MIN/1.73SQ M
GLUCOSE SERPL-MCNC: 118 MG/DL (ref 65–140)
GLUCOSE SERPL-MCNC: 118 MG/DL (ref 65–140)
GLUCOSE SERPL-MCNC: 132 MG/DL (ref 65–140)
GLUCOSE SERPL-MCNC: 139 MG/DL (ref 65–140)
GLUCOSE SERPL-MCNC: 150 MG/DL (ref 65–140)
GLUCOSE SERPL-MCNC: 160 MG/DL (ref 65–140)
GLUCOSE SERPL-MCNC: 176 MG/DL (ref 65–140)
GLUCOSE SERPL-MCNC: 177 MG/DL (ref 65–140)
GLUCOSE SERPL-MCNC: 179 MG/DL (ref 65–140)
GLUCOSE SERPL-MCNC: 183 MG/DL (ref 65–140)
GLUCOSE SERPL-MCNC: 192 MG/DL (ref 65–140)
GLUCOSE SERPL-MCNC: 192 MG/DL (ref 65–140)
GLUCOSE SERPL-MCNC: 197 MG/DL (ref 65–140)
GLUCOSE SERPL-MCNC: 204 MG/DL (ref 65–140)
GLUCOSE SERPL-MCNC: 205 MG/DL (ref 65–140)
GLUCOSE SERPL-MCNC: 219 MG/DL (ref 65–140)
GLUCOSE SERPL-MCNC: 51 MG/DL (ref 65–140)
GLUCOSE SERPL-MCNC: 60 MG/DL (ref 65–140)
GLUCOSE SERPL-MCNC: 62 MG/DL (ref 65–140)
GLUCOSE SERPL-MCNC: 62 MG/DL (ref 65–140)
GLUCOSE SERPL-MCNC: 64 MG/DL (ref 65–140)
GLUCOSE SERPL-MCNC: 67 MG/DL (ref 65–140)
GLUCOSE UR STRIP-MCNC: ABNORMAL MG/DL
HBA1C MFR BLD: 5.5 % (ref 4.2–6.3)
HCO3 BLDA-SCNC: 10.5 MMOL/L (ref 22–28)
HCO3 BLDA-SCNC: 11.6 MMOL/L (ref 22–28)
HCO3 BLDA-SCNC: 12.9 MMOL/L (ref 22–28)
HCO3 BLDA-SCNC: 12.9 MMOL/L (ref 22–28)
HCO3 BLDA-SCNC: 13.6 MMOL/L (ref 22–28)
HCO3 BLDA-SCNC: 14.2 MMOL/L (ref 22–28)
HCO3 BLDA-SCNC: 15.8 MMOL/L (ref 22–28)
HCO3 BLDA-SCNC: 19 MMOL/L (ref 22–28)
HCO3 BLDA-SCNC: 8 MMOL/L (ref 22–28)
HCO3 BLDV-SCNC: 7.8 MMOL/L (ref 24–30)
HCT VFR BLD AUTO: 27.5 % (ref 34.8–46.1)
HCT VFR BLD AUTO: 34.8 % (ref 34.8–46.1)
HCT VFR BLD AUTO: 39.1 % (ref 34.8–46.1)
HCT VFR BLD AUTO: 39.3 % (ref 34.8–46.1)
HCT VFR BLD CALC: 33 % (ref 34.8–46.1)
HCT VFR BLD CALC: 33 % (ref 34.8–46.1)
HCT VFR BLD CALC: 42 % (ref 34.8–46.1)
HDLC SERPL-MCNC: 42 MG/DL (ref 40–60)
HGB BLD-MCNC: 10.3 G/DL (ref 11.5–15.4)
HGB BLD-MCNC: 11.2 G/DL (ref 11.5–15.4)
HGB BLD-MCNC: 12 G/DL (ref 11.5–15.4)
HGB BLD-MCNC: 8.3 G/DL (ref 11.5–15.4)
HGB BLDA-MCNC: 11.2 G/DL (ref 11.5–15.4)
HGB BLDA-MCNC: 11.2 G/DL (ref 11.5–15.4)
HGB BLDA-MCNC: 14.3 G/DL (ref 11.5–15.4)
HGB UR QL STRIP.AUTO: ABNORMAL
HOROWITZ INDEX BLDA+IHG-RTO: 100 MM[HG]
HOROWITZ INDEX BLDA+IHG-RTO: 40 MM[HG]
HOROWITZ INDEX BLDA+IHG-RTO: 80 MM[HG]
HYALINE CASTS #/AREA URNS LPF: ABNORMAL /LPF
I-TIME: 0.85
I-TIME: 0.85
IMM GRANULOCYTES # BLD AUTO: 0.23 THOUSAND/UL (ref 0–0.2)
IMM GRANULOCYTES NFR BLD AUTO: 2 % (ref 0–2)
INR PPP: 1.38 (ref 0.84–1.19)
KETONES UR STRIP-MCNC: ABNORMAL MG/DL
L PNEUMO1 AG UR QL IA.RAPID: NEGATIVE
LACTATE SERPL-SCNC: 11.8 MMOL/L (ref 0.5–2)
LACTATE SERPL-SCNC: 13.5 MMOL/L (ref 0.5–2)
LACTATE SERPL-SCNC: 15.4 MMOL/L (ref 0.5–2)
LACTATE SERPL-SCNC: 15.9 MMOL/L (ref 0.5–2)
LACTATE SERPL-SCNC: 17.5 MMOL/L (ref 0.5–2)
LACTATE SERPL-SCNC: 17.7 MMOL/L (ref 0.5–2)
LACTATE SERPL-SCNC: 6.4 MMOL/L (ref 0.5–2)
LACTATE SERPL-SCNC: 7.1 MMOL/L (ref 0.5–2)
LACTATE SERPL-SCNC: 9.7 MMOL/L (ref 0.5–2)
LDLC SERPL CALC-MCNC: 63 MG/DL (ref 0–100)
LEUKOCYTE ESTERASE UR QL STRIP: NEGATIVE
LIPASE SERPL-CCNC: 2399 U/L (ref 73–393)
LYMPHOCYTES # BLD AUTO: 0.21 THOUSAND/UL (ref 0.6–4.47)
LYMPHOCYTES # BLD AUTO: 2.45 THOUSANDS/ΜL (ref 0.6–4.47)
LYMPHOCYTES # BLD AUTO: 8 % (ref 14–44)
LYMPHOCYTES NFR BLD AUTO: 17 % (ref 14–44)
MAGNESIUM SERPL-MCNC: 2.1 MG/DL (ref 1.6–2.6)
MAGNESIUM SERPL-MCNC: 2.5 MG/DL (ref 1.6–2.6)
MAGNESIUM SERPL-MCNC: 2.6 MG/DL (ref 1.6–2.6)
MAGNESIUM SERPL-MCNC: 2.7 MG/DL (ref 1.6–2.6)
MAGNESIUM SERPL-MCNC: 3.2 MG/DL (ref 1.6–2.6)
MAGNESIUM SERPL-MCNC: 3.8 MG/DL (ref 1.6–2.6)
MCH RBC QN AUTO: 30.7 PG (ref 26.8–34.3)
MCH RBC QN AUTO: 31.1 PG (ref 26.8–34.3)
MCH RBC QN AUTO: 31.7 PG (ref 26.8–34.3)
MCH RBC QN AUTO: 31.8 PG (ref 26.8–34.3)
MCHC RBC AUTO-ENTMCNC: 28.5 G/DL (ref 31.4–37.4)
MCHC RBC AUTO-ENTMCNC: 29.6 G/DL (ref 31.4–37.4)
MCHC RBC AUTO-ENTMCNC: 30.2 G/DL (ref 31.4–37.4)
MCHC RBC AUTO-ENTMCNC: 30.7 G/DL (ref 31.4–37.4)
MCV RBC AUTO: 100 FL (ref 82–98)
MCV RBC AUTO: 105 FL (ref 82–98)
MCV RBC AUTO: 105 FL (ref 82–98)
MCV RBC AUTO: 112 FL (ref 82–98)
METAMYELOCYTES NFR BLD MANUAL: 1 % (ref 0–1)
METHADONE UR QL: NEGATIVE
METHADONE UR QL: NEGATIVE
MONOCYTES # BLD AUTO: 0 THOUSAND/UL (ref 0–1.22)
MONOCYTES # BLD AUTO: 1.02 THOUSAND/ΜL (ref 0.17–1.22)
MONOCYTES NFR BLD AUTO: 7 % (ref 4–12)
MONOCYTES NFR BLD: 0 % (ref 4–12)
MYELOCYTES NFR BLD MANUAL: 1 % (ref 0–1)
NEUTROPHILS # BLD AUTO: 10.92 THOUSANDS/ΜL (ref 1.85–7.62)
NEUTROPHILS # BLD MANUAL: 2.34 THOUSAND/UL (ref 1.85–7.62)
NEUTS BAND NFR BLD MANUAL: 9 % (ref 0–8)
NEUTS SEG NFR BLD AUTO: 73 % (ref 43–75)
NEUTS SEG NFR BLD AUTO: 80 % (ref 43–75)
NITRITE UR QL STRIP: NEGATIVE
NON-SQ EPI CELLS URNS QL MICRO: ABNORMAL /HPF
NONHDLC SERPL-MCNC: 74 MG/DL
NRBC BLD AUTO-RTO: 0 /100 WBCS
NRBC BLD AUTO-RTO: 0 /100 WBCS
NRBC BLD AUTO-RTO: 1 /100 WBC (ref 0–2)
NT-PROBNP SERPL-MCNC: 2759 PG/ML
O2 CT BLDA-SCNC: 11.6 ML/DL (ref 16–23)
O2 CT BLDA-SCNC: 14.4 ML/DL (ref 16–23)
O2 CT BLDA-SCNC: 14.8 ML/DL (ref 16–23)
O2 CT BLDA-SCNC: 15.2 ML/DL (ref 16–23)
O2 CT BLDA-SCNC: 15.3 ML/DL (ref 16–23)
O2 CT BLDA-SCNC: 15.9 ML/DL (ref 16–23)
O2 CT BLDA-SCNC: 19.8 ML/DL (ref 16–23)
O2 CT BLDA-SCNC: 20.8 ML/DL (ref 16–23)
O2 CT BLDV-SCNC: 16.4 ML/DL
OPIATES UR QL SCN: NEGATIVE
OPIATES UR QL SCN: NEGATIVE
OXYHGB MFR BLDA: 88.1 % (ref 94–97)
OXYHGB MFR BLDA: 89.5 % (ref 94–97)
OXYHGB MFR BLDA: 89.7 % (ref 94–97)
OXYHGB MFR BLDA: 92.5 % (ref 94–97)
OXYHGB MFR BLDA: 93.8 % (ref 94–97)
OXYHGB MFR BLDA: 96 % (ref 94–97)
OXYHGB MFR BLDA: 97.2 % (ref 94–97)
OXYHGB MFR BLDA: 97.6 % (ref 94–97)
PCO2 BLD: 16 MMOL/L (ref 21–32)
PCO2 BLD: 55 MM HG (ref 36–44)
PCO2 BLD: >103 MM HG (ref 42–50)
PCO2 BLDA: 34.8 MM HG (ref 36–44)
PCO2 BLDA: 35.5 MM HG (ref 36–44)
PCO2 BLDA: 39.6 MM HG (ref 36–44)
PCO2 BLDA: 42.8 MM HG (ref 36–44)
PCO2 BLDA: 44.8 MM HG (ref 36–44)
PCO2 BLDA: 45 MM HG (ref 36–44)
PCO2 BLDA: 45.3 MM HG (ref 36–44)
PCO2 BLDA: 55.5 MM HG (ref 36–44)
PCO2 BLDV: 98.4 MM HG (ref 42–50)
PCP UR QL: NEGATIVE
PCP UR QL: NEGATIVE
PEEP RESPIRATORY: 10 CM[H2O]
PEEP RESPIRATORY: 10 CM[H2O]
PEEP RESPIRATORY: 5 CM[H2O]
PEEP RESPIRATORY: 8 CM[H2O]
PEEP RESPIRATORY: 8 CM[H2O]
PH BLD: 7.02 [PH] (ref 7.35–7.45)
PH BLD: <6.599 [PH] (ref 7.3–7.4)
PH BLDA: 6.89 [PH] (ref 7.35–7.45)
PH BLDA: 6.98 [PH] (ref 7.35–7.45)
PH BLDA: 7.07 [PH] (ref 7.35–7.45)
PH BLDA: 7.1 [PH] (ref 7.35–7.45)
PH BLDA: 7.13 [PH] (ref 7.35–7.45)
PH BLDA: 7.15 [PH] (ref 7.35–7.45)
PH BLDA: 7.19 [PH] (ref 7.35–7.45)
PH BLDA: 7.22 [PH] (ref 7.35–7.45)
PH BLDV: 6.52 [PH] (ref 7.3–7.4)
PH UR STRIP.AUTO: 5 [PH]
PHOSPHATE SERPL-MCNC: 14.1 MG/DL (ref 2.3–4.1)
PHOSPHATE SERPL-MCNC: 8.1 MG/DL (ref 2.3–4.1)
PHOSPHATE SERPL-MCNC: 9.6 MG/DL (ref 2.3–4.1)
PLATELET # BLD AUTO: 108 THOUSANDS/UL (ref 149–390)
PLATELET # BLD AUTO: 138 THOUSANDS/UL (ref 149–390)
PLATELET # BLD AUTO: 210 THOUSANDS/UL (ref 149–390)
PLATELET # BLD AUTO: 225 THOUSANDS/UL (ref 149–390)
PLATELET BLD QL SMEAR: ADEQUATE
PMV BLD AUTO: 10.1 FL (ref 8.9–12.7)
PMV BLD AUTO: 10.5 FL (ref 8.9–12.7)
PMV BLD AUTO: 9.7 FL (ref 8.9–12.7)
PMV BLD AUTO: 9.9 FL (ref 8.9–12.7)
PO2 BLD: 123 MM HG (ref 35–45)
PO2 BLD: 205 MM HG (ref 75–129)
PO2 BLDA: 101.8 MM HG (ref 75–129)
PO2 BLDA: 134.1 MM HG (ref 75–129)
PO2 BLDA: 371.3 MM HG (ref 75–129)
PO2 BLDA: 530.3 MM HG (ref 75–129)
PO2 BLDA: 69.9 MM HG (ref 75–129)
PO2 BLDA: 76.2 MM HG (ref 75–129)
PO2 BLDA: 79.9 MM HG (ref 75–129)
PO2 BLDA: 97.1 MM HG (ref 75–129)
PO2 BLDV: 137.3 MM HG (ref 35–45)
POTASSIUM BLD-SCNC: 4.7 MMOL/L (ref 3.5–5.3)
POTASSIUM BLD-SCNC: 4.7 MMOL/L (ref 3.5–5.3)
POTASSIUM BLD-SCNC: 5.5 MMOL/L (ref 3.5–5.3)
POTASSIUM SERPL-SCNC: 4 MMOL/L (ref 3.5–5.3)
POTASSIUM SERPL-SCNC: 4.2 MMOL/L (ref 3.5–5.3)
POTASSIUM SERPL-SCNC: 4.6 MMOL/L (ref 3.5–5.3)
POTASSIUM SERPL-SCNC: 4.8 MMOL/L (ref 3.5–5.3)
POTASSIUM SERPL-SCNC: 5 MMOL/L (ref 3.5–5.3)
POTASSIUM SERPL-SCNC: 5 MMOL/L (ref 3.5–5.3)
POTASSIUM SERPL-SCNC: 6 MMOL/L (ref 3.5–5.3)
PROCALCITONIN SERPL-MCNC: 6.44 NG/ML
PROT SERPL-MCNC: 4.4 G/DL (ref 6.4–8.2)
PROT SERPL-MCNC: 4.5 G/DL (ref 6.4–8.2)
PROT UR STRIP-MCNC: ABNORMAL MG/DL
PROTHROMBIN TIME: 17.1 SECONDS (ref 11.6–14.5)
PS VENT FIO2: 40
PS VENT PEEP: 5
QRS AXIS: 21 DEGREES
QRSD INTERVAL: 100 MS
QT INTERVAL: 420 MS
QTC INTERVAL: 536 MS
RBC # BLD AUTO: 2.62 MILLION/UL (ref 3.81–5.12)
RBC # BLD AUTO: 3.31 MILLION/UL (ref 3.81–5.12)
RBC # BLD AUTO: 3.52 MILLION/UL (ref 3.81–5.12)
RBC # BLD AUTO: 3.91 MILLION/UL (ref 3.81–5.12)
RBC #/AREA URNS AUTO: ABNORMAL /HPF
S PNEUM AG UR QL: POSITIVE
SALICYLATES SERPL-MCNC: 3.5 MG/DL (ref 3–20)
SAO2 % BLD FROM PO2: 99 % (ref 60–85)
SODIUM BLD-SCNC: 143 MMOL/L (ref 136–145)
SODIUM BLD-SCNC: 151 MMOL/L (ref 136–145)
SODIUM BLD-SCNC: 151 MMOL/L (ref 136–145)
SODIUM SERPL-SCNC: 145 MMOL/L (ref 136–145)
SODIUM SERPL-SCNC: 149 MMOL/L (ref 136–145)
SODIUM SERPL-SCNC: 150 MMOL/L (ref 136–145)
SODIUM SERPL-SCNC: 151 MMOL/L (ref 136–145)
SODIUM SERPL-SCNC: 152 MMOL/L (ref 136–145)
SODIUM SERPL-SCNC: 155 MMOL/L (ref 136–145)
SODIUM SERPL-SCNC: 156 MMOL/L (ref 136–145)
SP GR UR STRIP.AUTO: 1.02 (ref 1–1.03)
SPECIMEN SOURCE: ABNORMAL
T WAVE AXIS: 237 DEGREES
THC UR QL: POSITIVE
THC UR QL: POSITIVE
TOTAL CELLS COUNTED SPEC: 100
TRIGL SERPL-MCNC: 56 MG/DL
TROPONIN I SERPL-MCNC: 0.08 NG/ML
TROPONIN I SERPL-MCNC: 0.19 NG/ML
TROPONIN I SERPL-MCNC: 0.32 NG/ML
TROPONIN I SERPL-MCNC: 0.58 NG/ML
TROPONIN I SERPL-MCNC: 0.6 NG/ML
TROPONIN I SERPL-MCNC: 1.08 NG/ML
TSH SERPL DL<=0.05 MIU/L-ACNC: 3.3 UIU/ML (ref 0.36–3.74)
UROBILINOGEN UR QL STRIP.AUTO: 2 E.U./DL
VENT - PS: ABNORMAL
VENT AC: 26
VENT AC: 30
VENT AC: 30
VENT- AC: AC
VENTRICULAR RATE: 98 BPM
VT SETTING VENT: 350 ML
VT SETTING VENT: 400 ML
WBC # BLD AUTO: 14.73 THOUSAND/UL (ref 4.31–10.16)
WBC # BLD AUTO: 2.63 THOUSAND/UL (ref 4.31–10.16)
WBC # BLD AUTO: 4.86 THOUSAND/UL (ref 4.31–10.16)
WBC # BLD AUTO: 5.11 THOUSAND/UL (ref 4.31–10.16)
WBC #/AREA URNS AUTO: ABNORMAL /HPF

## 2019-01-01 PROCEDURE — 88305 TISSUE EXAM BY PATHOLOGIST: CPT | Performed by: PATHOLOGY

## 2019-01-01 PROCEDURE — 84132 ASSAY OF SERUM POTASSIUM: CPT

## 2019-01-01 PROCEDURE — 85027 COMPLETE CBC AUTOMATED: CPT | Performed by: NURSE PRACTITIONER

## 2019-01-01 PROCEDURE — 94640 AIRWAY INHALATION TREATMENT: CPT

## 2019-01-01 PROCEDURE — 74176 CT ABD & PELVIS W/O CONTRAST: CPT

## 2019-01-01 PROCEDURE — 99213 OFFICE O/P EST LOW 20 MIN: CPT | Performed by: ORTHOPAEDIC SURGERY

## 2019-01-01 PROCEDURE — 87184 SC STD DISK METHOD PER PLATE: CPT | Performed by: NURSE PRACTITIONER

## 2019-01-01 PROCEDURE — 99292 CRITICAL CARE ADDL 30 MIN: CPT | Performed by: EMERGENCY MEDICINE

## 2019-01-01 PROCEDURE — 84295 ASSAY OF SERUM SODIUM: CPT

## 2019-01-01 PROCEDURE — 87070 CULTURE OTHR SPECIMN AEROBIC: CPT | Performed by: NURSE PRACTITIONER

## 2019-01-01 PROCEDURE — 87449 NOS EACH ORGANISM AG IA: CPT | Performed by: NURSE PRACTITIONER

## 2019-01-01 PROCEDURE — 94760 N-INVAS EAR/PLS OXIMETRY 1: CPT

## 2019-01-01 PROCEDURE — 93005 ELECTROCARDIOGRAM TRACING: CPT

## 2019-01-01 PROCEDURE — 81001 URINALYSIS AUTO W/SCOPE: CPT | Performed by: NURSE PRACTITIONER

## 2019-01-01 PROCEDURE — 80053 COMPREHEN METABOLIC PANEL: CPT | Performed by: NURSE PRACTITIONER

## 2019-01-01 PROCEDURE — 82075 ASSAY OF BREATH ETHANOL: CPT | Performed by: EMERGENCY MEDICINE

## 2019-01-01 PROCEDURE — 83690 ASSAY OF LIPASE: CPT | Performed by: NURSE PRACTITIONER

## 2019-01-01 PROCEDURE — 93325 DOPPLER ECHO COLOR FLOW MAPG: CPT | Performed by: INTERNAL MEDICINE

## 2019-01-01 PROCEDURE — 96365 THER/PROPH/DIAG IV INF INIT: CPT

## 2019-01-01 PROCEDURE — 87205 SMEAR GRAM STAIN: CPT | Performed by: NURSE PRACTITIONER

## 2019-01-01 PROCEDURE — G0439 PPPS, SUBSEQ VISIT: HCPCS | Performed by: INTERNAL MEDICINE

## 2019-01-01 PROCEDURE — NC001 PR NO CHARGE: Performed by: EMERGENCY MEDICINE

## 2019-01-01 PROCEDURE — 87185 SC STD ENZYME DETCJ PER NZM: CPT | Performed by: NURSE PRACTITIONER

## 2019-01-01 PROCEDURE — 82948 REAGENT STRIP/BLOOD GLUCOSE: CPT

## 2019-01-01 PROCEDURE — 83735 ASSAY OF MAGNESIUM: CPT | Performed by: NURSE PRACTITIONER

## 2019-01-01 PROCEDURE — 77063 BREAST TOMOSYNTHESIS BI: CPT

## 2019-01-01 PROCEDURE — 3725F SCREEN DEPRESSION PERFORMED: CPT | Performed by: INTERNAL MEDICINE

## 2019-01-01 PROCEDURE — 85014 HEMATOCRIT: CPT

## 2019-01-01 PROCEDURE — 99223 1ST HOSP IP/OBS HIGH 75: CPT | Performed by: INTERNAL MEDICINE

## 2019-01-01 PROCEDURE — 83605 ASSAY OF LACTIC ACID: CPT | Performed by: NURSE PRACTITIONER

## 2019-01-01 PROCEDURE — 83036 HEMOGLOBIN GLYCOSYLATED A1C: CPT | Performed by: NURSE PRACTITIONER

## 2019-01-01 PROCEDURE — 99291 CRITICAL CARE FIRST HOUR: CPT | Performed by: EMERGENCY MEDICINE

## 2019-01-01 PROCEDURE — 84484 ASSAY OF TROPONIN QUANT: CPT | Performed by: NURSE PRACTITIONER

## 2019-01-01 PROCEDURE — C9113 INJ PANTOPRAZOLE SODIUM, VIA: HCPCS | Performed by: NURSE PRACTITIONER

## 2019-01-01 PROCEDURE — 82330 ASSAY OF CALCIUM: CPT | Performed by: NURSE PRACTITIONER

## 2019-01-01 PROCEDURE — 84100 ASSAY OF PHOSPHORUS: CPT | Performed by: NURSE PRACTITIONER

## 2019-01-01 PROCEDURE — 84443 ASSAY THYROID STIM HORMONE: CPT | Performed by: EMERGENCY MEDICINE

## 2019-01-01 PROCEDURE — 80048 BASIC METABOLIC PNL TOTAL CA: CPT | Performed by: EMERGENCY MEDICINE

## 2019-01-01 PROCEDURE — 82550 ASSAY OF CK (CPK): CPT | Performed by: EMERGENCY MEDICINE

## 2019-01-01 PROCEDURE — 36415 COLL VENOUS BLD VENIPUNCTURE: CPT | Performed by: EMERGENCY MEDICINE

## 2019-01-01 PROCEDURE — 87040 BLOOD CULTURE FOR BACTERIA: CPT | Performed by: EMERGENCY MEDICINE

## 2019-01-01 PROCEDURE — 36620 INSERTION CATHETER ARTERY: CPT | Performed by: NURSE PRACTITIONER

## 2019-01-01 PROCEDURE — 4A133B1 MONITORING OF ARTERIAL PRESSURE, PERIPHERAL, PERCUTANEOUS APPROACH: ICD-10-PCS | Performed by: ANESTHESIOLOGY

## 2019-01-01 PROCEDURE — NC001 PR NO CHARGE: Performed by: INTERNAL MEDICINE

## 2019-01-01 PROCEDURE — 80048 BASIC METABOLIC PNL TOTAL CA: CPT | Performed by: NURSE PRACTITIONER

## 2019-01-01 PROCEDURE — 87077 CULTURE AEROBIC IDENTIFY: CPT | Performed by: EMERGENCY MEDICINE

## 2019-01-01 PROCEDURE — 93306 TTE W/DOPPLER COMPLETE: CPT

## 2019-01-01 PROCEDURE — 43249 ESOPH EGD DILATION <30 MM: CPT | Performed by: INTERNAL MEDICINE

## 2019-01-01 PROCEDURE — 5A1945Z RESPIRATORY VENTILATION, 24-96 CONSECUTIVE HOURS: ICD-10-PCS | Performed by: ANESTHESIOLOGY

## 2019-01-01 PROCEDURE — 99203 OFFICE O/P NEW LOW 30 MIN: CPT | Performed by: PHYSICIAN ASSISTANT

## 2019-01-01 PROCEDURE — 99292 CRITICAL CARE ADDL 30 MIN: CPT | Performed by: ANESTHESIOLOGY

## 2019-01-01 PROCEDURE — 82947 ASSAY GLUCOSE BLOOD QUANT: CPT

## 2019-01-01 PROCEDURE — 99203 OFFICE O/P NEW LOW 30 MIN: CPT | Performed by: ORTHOPAEDIC SURGERY

## 2019-01-01 PROCEDURE — 87631 RESP VIRUS 3-5 TARGETS: CPT | Performed by: NURSE PRACTITIONER

## 2019-01-01 PROCEDURE — 36620 INSERTION CATHETER ARTERY: CPT | Performed by: ANESTHESIOLOGY

## 2019-01-01 PROCEDURE — 80047 BASIC METABLC PNL IONIZED CA: CPT

## 2019-01-01 PROCEDURE — 82805 BLOOD GASES W/O2 SATURATION: CPT

## 2019-01-01 PROCEDURE — 82803 BLOOD GASES ANY COMBINATION: CPT

## 2019-01-01 PROCEDURE — 93010 ELECTROCARDIOGRAM REPORT: CPT | Performed by: INTERNAL MEDICINE

## 2019-01-01 PROCEDURE — 80162 ASSAY OF DIGOXIN TOTAL: CPT | Performed by: ANESTHESIOLOGY

## 2019-01-01 PROCEDURE — 80061 LIPID PANEL: CPT | Performed by: NURSE PRACTITIONER

## 2019-01-01 PROCEDURE — 4004F PT TOBACCO SCREEN RCVD TLK: CPT | Performed by: INTERNAL MEDICINE

## 2019-01-01 PROCEDURE — 43239 EGD BIOPSY SINGLE/MULTIPLE: CPT | Performed by: INTERNAL MEDICINE

## 2019-01-01 PROCEDURE — 3074F SYST BP LT 130 MM HG: CPT | Performed by: INTERNAL MEDICINE

## 2019-01-01 PROCEDURE — 99214 OFFICE O/P EST MOD 30 MIN: CPT | Performed by: PHYSICIAN ASSISTANT

## 2019-01-01 PROCEDURE — 73521 X-RAY EXAM HIPS BI 2 VIEWS: CPT

## 2019-01-01 PROCEDURE — 85025 COMPLETE CBC W/AUTO DIFF WBC: CPT | Performed by: EMERGENCY MEDICINE

## 2019-01-01 PROCEDURE — 99214 OFFICE O/P EST MOD 30 MIN: CPT | Performed by: INTERNAL MEDICINE

## 2019-01-01 PROCEDURE — 80076 HEPATIC FUNCTION PANEL: CPT | Performed by: EMERGENCY MEDICINE

## 2019-01-01 PROCEDURE — 02HV33Z INSERTION OF INFUSION DEVICE INTO SUPERIOR VENA CAVA, PERCUTANEOUS APPROACH: ICD-10-PCS | Performed by: ANESTHESIOLOGY

## 2019-01-01 PROCEDURE — 87081 CULTURE SCREEN ONLY: CPT | Performed by: NURSE PRACTITIONER

## 2019-01-01 PROCEDURE — 82805 BLOOD GASES W/O2 SATURATION: CPT | Performed by: NURSE PRACTITIONER

## 2019-01-01 PROCEDURE — 99284 EMERGENCY DEPT VISIT MOD MDM: CPT

## 2019-01-01 PROCEDURE — 84484 ASSAY OF TROPONIN QUANT: CPT | Performed by: EMERGENCY MEDICINE

## 2019-01-01 PROCEDURE — 87077 CULTURE AEROBIC IDENTIFY: CPT | Performed by: NURSE PRACTITIONER

## 2019-01-01 PROCEDURE — 83605 ASSAY OF LACTIC ACID: CPT | Performed by: EMERGENCY MEDICINE

## 2019-01-01 PROCEDURE — 99285 EMERGENCY DEPT VISIT HI MDM: CPT

## 2019-01-01 PROCEDURE — 93321 DOPPLER ECHO F-UP/LMTD STD: CPT | Performed by: INTERNAL MEDICINE

## 2019-01-01 PROCEDURE — 82550 ASSAY OF CK (CPK): CPT | Performed by: NURSE PRACTITIONER

## 2019-01-01 PROCEDURE — 82553 CREATINE MB FRACTION: CPT | Performed by: NURSE PRACTITIONER

## 2019-01-01 PROCEDURE — A9585 GADOBUTROL INJECTION: HCPCS | Performed by: PSYCHIATRY & NEUROLOGY

## 2019-01-01 PROCEDURE — NC001 PR NO CHARGE: Performed by: NURSE PRACTITIONER

## 2019-01-01 PROCEDURE — NC001 PR NO CHARGE: Performed by: ANESTHESIOLOGY

## 2019-01-01 PROCEDURE — 20610 DRAIN/INJ JOINT/BURSA W/O US: CPT | Performed by: PHYSICIAN ASSISTANT

## 2019-01-01 PROCEDURE — 85007 BL SMEAR W/DIFF WBC COUNT: CPT | Performed by: NURSE PRACTITIONER

## 2019-01-01 PROCEDURE — 99213 OFFICE O/P EST LOW 20 MIN: CPT | Performed by: INTERNAL MEDICINE

## 2019-01-01 PROCEDURE — 80329 ANALGESICS NON-OPIOID 1 OR 2: CPT | Performed by: NURSE PRACTITIONER

## 2019-01-01 PROCEDURE — 80320 DRUG SCREEN QUANTALCOHOLS: CPT | Performed by: NURSE PRACTITIONER

## 2019-01-01 PROCEDURE — 99291 CRITICAL CARE FIRST HOUR: CPT | Performed by: ANESTHESIOLOGY

## 2019-01-01 PROCEDURE — 84145 PROCALCITONIN (PCT): CPT | Performed by: NURSE PRACTITIONER

## 2019-01-01 PROCEDURE — 83880 ASSAY OF NATRIURETIC PEPTIDE: CPT | Performed by: NURSE PRACTITIONER

## 2019-01-01 PROCEDURE — 80307 DRUG TEST PRSMV CHEM ANLYZR: CPT | Performed by: EMERGENCY MEDICINE

## 2019-01-01 PROCEDURE — 70450 CT HEAD/BRAIN W/O DYE: CPT

## 2019-01-01 PROCEDURE — 99213 OFFICE O/P EST LOW 20 MIN: CPT | Performed by: PSYCHIATRY & NEUROLOGY

## 2019-01-01 PROCEDURE — 03HY32Z INSERTION OF MONITORING DEVICE INTO UPPER ARTERY, PERCUTANEOUS APPROACH: ICD-10-PCS | Performed by: ANESTHESIOLOGY

## 2019-01-01 PROCEDURE — 90945 DIALYSIS ONE EVALUATION: CPT

## 2019-01-01 PROCEDURE — 72125 CT NECK SPINE W/O DYE: CPT

## 2019-01-01 PROCEDURE — 70553 MRI BRAIN STEM W/O & W/DYE: CPT

## 2019-01-01 PROCEDURE — 70543 MRI ORBT/FAC/NCK W/O &W/DYE: CPT

## 2019-01-01 PROCEDURE — 96368 THER/DIAG CONCURRENT INF: CPT

## 2019-01-01 PROCEDURE — 5A1D90Z PERFORMANCE OF URINARY FILTRATION, CONTINUOUS, GREATER THAN 18 HOURS PER DAY: ICD-10-PCS | Performed by: ANESTHESIOLOGY

## 2019-01-01 PROCEDURE — 4A133J1 MONITORING OF ARTERIAL PULSE, PERIPHERAL, PERCUTANEOUS APPROACH: ICD-10-PCS | Performed by: ANESTHESIOLOGY

## 2019-01-01 PROCEDURE — 82693 ASSAY OF ETHYLENE GLYCOL: CPT | Performed by: NURSE PRACTITIONER

## 2019-01-01 PROCEDURE — 94003 VENT MGMT INPAT SUBQ DAY: CPT

## 2019-01-01 PROCEDURE — 80307 DRUG TEST PRSMV CHEM ANLYZR: CPT | Performed by: NURSE PRACTITIONER

## 2019-01-01 PROCEDURE — 82330 ASSAY OF CALCIUM: CPT

## 2019-01-01 PROCEDURE — 77067 SCR MAMMO BI INCL CAD: CPT

## 2019-01-01 PROCEDURE — 99215 OFFICE O/P EST HI 40 MIN: CPT | Performed by: INTERNAL MEDICINE

## 2019-01-01 PROCEDURE — 85610 PROTHROMBIN TIME: CPT | Performed by: NURSE PRACTITIONER

## 2019-01-01 PROCEDURE — 94002 VENT MGMT INPAT INIT DAY: CPT

## 2019-01-01 PROCEDURE — 71045 X-RAY EXAM CHEST 1 VIEW: CPT

## 2019-01-01 PROCEDURE — 3008F BODY MASS INDEX DOCD: CPT | Performed by: PHYSICIAN ASSISTANT

## 2019-01-01 PROCEDURE — 3008F BODY MASS INDEX DOCD: CPT | Performed by: INTERNAL MEDICINE

## 2019-01-01 PROCEDURE — 4004F PT TOBACCO SCREEN RCVD TLK: CPT | Performed by: PHYSICIAN ASSISTANT

## 2019-01-01 PROCEDURE — 80329 ANALGESICS NON-OPIOID 1 OR 2: CPT | Performed by: EMERGENCY MEDICINE

## 2019-01-01 PROCEDURE — 36556 INSERT NON-TUNNEL CV CATH: CPT | Performed by: NURSE PRACTITIONER

## 2019-01-01 PROCEDURE — 71250 CT THORAX DX C-: CPT

## 2019-01-01 PROCEDURE — 93308 TTE F-UP OR LMTD: CPT | Performed by: INTERNAL MEDICINE

## 2019-01-01 PROCEDURE — 82553 CREATINE MB FRACTION: CPT | Performed by: EMERGENCY MEDICINE

## 2019-01-01 RX ORDER — PROPOFOL 10 MG/ML
INJECTION, EMULSION INTRAVENOUS AS NEEDED
Status: DISCONTINUED | OUTPATIENT
Start: 2019-01-01 | End: 2019-01-01 | Stop reason: SURG

## 2019-01-01 RX ORDER — BUPROPION HYDROCHLORIDE 150 MG/1
150 TABLET ORAL DAILY
Qty: 90 TABLET | Refills: 1 | Status: SHIPPED | OUTPATIENT
Start: 2019-01-01 | End: 2019-01-01 | Stop reason: SDUPTHER

## 2019-01-01 RX ORDER — PRIMIDONE 50 MG/1
TABLET ORAL
Qty: 180 TABLET | Refills: 1 | Status: SHIPPED | OUTPATIENT
Start: 2019-01-01

## 2019-01-01 RX ORDER — MECLIZINE HYDROCHLORIDE 25 MG/1
25 TABLET ORAL EVERY 12 HOURS PRN
Qty: 60 TABLET | Refills: 3 | Status: SHIPPED | OUTPATIENT
Start: 2019-01-01 | End: 2019-01-01 | Stop reason: SDUPTHER

## 2019-01-01 RX ORDER — AMOXICILLIN AND CLAVULANATE POTASSIUM 875; 125 MG/1; MG/1
1 TABLET, FILM COATED ORAL EVERY 12 HOURS SCHEDULED
Qty: 14 TABLET | Refills: 0 | Status: SHIPPED | OUTPATIENT
Start: 2019-01-01 | End: 2019-01-01

## 2019-01-01 RX ORDER — ORPHENADRINE CITRATE 100 MG/1
100 TABLET, EXTENDED RELEASE ORAL
Qty: 30 TABLET | Refills: 1 | Status: SHIPPED | OUTPATIENT
Start: 2019-01-01 | End: 2019-01-01

## 2019-01-01 RX ORDER — PREDNISONE 10 MG/1
TABLET ORAL
Qty: 20 TABLET | Refills: 0 | Status: SHIPPED | OUTPATIENT
Start: 2019-01-01 | End: 2019-01-01 | Stop reason: CLARIF

## 2019-01-01 RX ORDER — CALCIUM CHLORIDE 100 MG/ML
1 INJECTION INTRAVENOUS; INTRAVENTRICULAR ONCE
Status: COMPLETED | OUTPATIENT
Start: 2019-01-01 | End: 2019-01-01

## 2019-01-01 RX ORDER — LIDOCAINE HYDROCHLORIDE 10 MG/ML
2 INJECTION, SOLUTION INFILTRATION; PERINEURAL
Status: COMPLETED | OUTPATIENT
Start: 2019-01-01 | End: 2019-01-01

## 2019-01-01 RX ORDER — HYDROCODONE BITARTRATE AND ACETAMINOPHEN 5; 300 MG/1; MG/1
1 TABLET ORAL EVERY 8 HOURS PRN
Refills: 0 | COMMUNITY
Start: 2019-01-01 | End: 2019-01-01

## 2019-01-01 RX ORDER — VANCOMYCIN HYDROCHLORIDE 1 G/200ML
1000 INJECTION, SOLUTION INTRAVENOUS EVERY 24 HOURS
Status: DISCONTINUED | OUTPATIENT
Start: 2019-10-21 | End: 2019-01-01

## 2019-01-01 RX ORDER — LORATADINE 10 MG/1
10 TABLET ORAL DAILY
COMMUNITY

## 2019-01-01 RX ORDER — ORPHENADRINE CITRATE 100 MG/1
1 TABLET, EXTENDED RELEASE ORAL
COMMUNITY
End: 2019-01-01 | Stop reason: CLARIF

## 2019-01-01 RX ORDER — SODIUM CHLORIDE, SODIUM LACTATE, POTASSIUM CHLORIDE, CALCIUM CHLORIDE 600; 310; 30; 20 MG/100ML; MG/100ML; MG/100ML; MG/100ML
INJECTION, SOLUTION INTRAVENOUS CONTINUOUS PRN
Status: DISCONTINUED | OUTPATIENT
Start: 2019-01-01 | End: 2019-01-01 | Stop reason: SURG

## 2019-01-01 RX ORDER — ERGOCALCIFEROL 1.25 MG/1
50000 CAPSULE ORAL WEEKLY
Qty: 8 CAPSULE | Refills: 3 | Status: SHIPPED | OUTPATIENT
Start: 2019-01-01

## 2019-01-01 RX ORDER — PANTOPRAZOLE SODIUM 40 MG/1
40 INJECTION, POWDER, FOR SOLUTION INTRAVENOUS
Status: DISCONTINUED | OUTPATIENT
Start: 2019-01-01 | End: 2019-01-01

## 2019-01-01 RX ORDER — FENTANYL CITRATE 50 UG/ML
100 INJECTION, SOLUTION INTRAMUSCULAR; INTRAVENOUS ONCE
Status: COMPLETED | OUTPATIENT
Start: 2019-01-01 | End: 2019-01-01

## 2019-01-01 RX ORDER — GLYCOPYRROLATE 0.2 MG/ML
0.1 INJECTION INTRAMUSCULAR; INTRAVENOUS
Status: DISCONTINUED | OUTPATIENT
Start: 2019-01-01 | End: 2019-10-21 | Stop reason: HOSPADM

## 2019-01-01 RX ORDER — VANCOMYCIN HYDROCHLORIDE 500 MG/100ML
500 INJECTION, SOLUTION INTRAVENOUS ONCE
Status: COMPLETED | OUTPATIENT
Start: 2019-01-01 | End: 2019-01-01

## 2019-01-01 RX ORDER — MECLIZINE HYDROCHLORIDE 25 MG/1
25 TABLET ORAL EVERY 12 HOURS PRN
Qty: 60 TABLET | Refills: 3 | Status: SHIPPED | OUTPATIENT
Start: 2019-01-01

## 2019-01-01 RX ORDER — MELOXICAM 15 MG/1
15 TABLET ORAL DAILY
COMMUNITY
Start: 2019-01-01 | End: 2019-01-01

## 2019-01-01 RX ORDER — DIVALPROEX SODIUM 250 MG/1
250 TABLET, DELAYED RELEASE ORAL EVERY 12 HOURS SCHEDULED
Qty: 60 TABLET | Refills: 3 | Status: SHIPPED | OUTPATIENT
Start: 2019-01-01 | End: 2019-01-01 | Stop reason: SDUPTHER

## 2019-01-01 RX ORDER — FENTANYL CITRATE-0.9 % NACL/PF 10 MCG/ML
50 PLASTIC BAG, INJECTION (ML) INTRAVENOUS CONTINUOUS
Status: DISCONTINUED | OUTPATIENT
Start: 2019-01-01 | End: 2019-01-01

## 2019-01-01 RX ORDER — FENTANYL CITRATE 50 UG/ML
50 INJECTION, SOLUTION INTRAMUSCULAR; INTRAVENOUS CONTINUOUS
Status: DISCONTINUED | OUTPATIENT
Start: 2019-01-01 | End: 2019-01-01 | Stop reason: SDUPTHER

## 2019-01-01 RX ORDER — HEPARIN SODIUM 5000 [USP'U]/ML
5000 INJECTION, SOLUTION INTRAVENOUS; SUBCUTANEOUS EVERY 8 HOURS SCHEDULED
Status: DISCONTINUED | OUTPATIENT
Start: 2019-01-01 | End: 2019-01-01

## 2019-01-01 RX ORDER — ERGOCALCIFEROL 1.25 MG/1
CAPSULE ORAL
COMMUNITY
Start: 2019-01-01 | End: 2019-01-01 | Stop reason: SDUPTHER

## 2019-01-01 RX ORDER — FENTANYL CITRATE 50 UG/ML
100 INJECTION, SOLUTION INTRAMUSCULAR; INTRAVENOUS
Status: DISCONTINUED | OUTPATIENT
Start: 2019-01-01 | End: 2019-10-21 | Stop reason: HOSPADM

## 2019-01-01 RX ORDER — FENTANYL CITRATE 50 UG/ML
INJECTION, SOLUTION INTRAMUSCULAR; INTRAVENOUS
Status: DISCONTINUED
Start: 2019-01-01 | End: 2019-01-01 | Stop reason: WASHOUT

## 2019-01-01 RX ORDER — METHYLPREDNISOLONE ACETATE 40 MG/ML
1 INJECTION, SUSPENSION INTRA-ARTICULAR; INTRALESIONAL; INTRAMUSCULAR; SOFT TISSUE
Status: COMPLETED | OUTPATIENT
Start: 2019-01-01 | End: 2019-01-01

## 2019-01-01 RX ORDER — FENTANYL CITRATE 50 UG/ML
INJECTION, SOLUTION INTRAMUSCULAR; INTRAVENOUS
Status: DISPENSED
Start: 2019-01-01 | End: 2019-01-01

## 2019-01-01 RX ORDER — DEXTROSE MONOHYDRATE 25 G/50ML
50 INJECTION, SOLUTION INTRAVENOUS ONCE
Status: COMPLETED | OUTPATIENT
Start: 2019-01-01 | End: 2019-01-01

## 2019-01-01 RX ORDER — SODIUM CHLORIDE, SODIUM GLUCONATE, SODIUM ACETATE, POTASSIUM CHLORIDE, MAGNESIUM CHLORIDE, SODIUM PHOSPHATE, DIBASIC, AND POTASSIUM PHOSPHATE .53; .5; .37; .037; .03; .012; .00082 G/100ML; G/100ML; G/100ML; G/100ML; G/100ML; G/100ML; G/100ML
1000 INJECTION, SOLUTION INTRAVENOUS ONCE
Status: COMPLETED | OUTPATIENT
Start: 2019-01-01 | End: 2019-01-01

## 2019-01-01 RX ORDER — DICLOFENAC SODIUM 75 MG/1
75 TABLET, DELAYED RELEASE ORAL
COMMUNITY
Start: 2019-01-01 | End: 2019-01-01

## 2019-01-01 RX ORDER — MIDAZOLAM HYDROCHLORIDE 1 MG/ML
2 INJECTION INTRAMUSCULAR; INTRAVENOUS CONTINUOUS
Status: DISCONTINUED | OUTPATIENT
Start: 2019-01-01 | End: 2019-01-01 | Stop reason: SDUPTHER

## 2019-01-01 RX ORDER — LORAZEPAM 2 MG/ML
1 INJECTION INTRAMUSCULAR
Status: DISCONTINUED | OUTPATIENT
Start: 2019-01-01 | End: 2019-10-21 | Stop reason: HOSPADM

## 2019-01-01 RX ORDER — FENTANYL CITRATE-0.9 % NACL/PF 10 MCG/ML
150 PLASTIC BAG, INJECTION (ML) INTRAVENOUS CONTINUOUS
Status: DISCONTINUED | OUTPATIENT
Start: 2019-01-01 | End: 2019-10-21 | Stop reason: HOSPADM

## 2019-01-01 RX ORDER — CHLORHEXIDINE GLUCONATE 0.12 MG/ML
15 RINSE ORAL EVERY 12 HOURS SCHEDULED
Status: DISCONTINUED | OUTPATIENT
Start: 2019-01-01 | End: 2019-10-21 | Stop reason: HOSPADM

## 2019-01-01 RX ORDER — FENTANYL CITRATE 50 UG/ML
200 INJECTION, SOLUTION INTRAMUSCULAR; INTRAVENOUS ONCE
Status: COMPLETED | OUTPATIENT
Start: 2019-01-01 | End: 2019-01-01

## 2019-01-01 RX ORDER — LEVALBUTEROL 1.25 MG/.5ML
1.25 SOLUTION, CONCENTRATE RESPIRATORY (INHALATION)
Status: DISCONTINUED | OUTPATIENT
Start: 2019-01-01 | End: 2019-01-01

## 2019-01-01 RX ORDER — DIVALPROEX SODIUM 250 MG/1
250 TABLET, DELAYED RELEASE ORAL EVERY 12 HOURS SCHEDULED
Qty: 60 TABLET | Refills: 1 | Status: SHIPPED | OUTPATIENT
Start: 2019-01-01 | End: 2019-01-01 | Stop reason: SDUPTHER

## 2019-01-01 RX ORDER — MIDAZOLAM HYDROCHLORIDE 1 MG/ML
INJECTION INTRAMUSCULAR; INTRAVENOUS
Status: DISPENSED
Start: 2019-01-01 | End: 2019-01-01

## 2019-01-01 RX ORDER — BUTALBITAL, ACETAMINOPHEN AND CAFFEINE 50; 325; 40 MG/1; MG/1; MG/1
1 TABLET ORAL 2 TIMES DAILY PRN
Qty: 40 TABLET | Refills: 1 | Status: SHIPPED | OUTPATIENT
Start: 2019-01-01

## 2019-01-01 RX ORDER — SODIUM CHLORIDE, SODIUM LACTATE, POTASSIUM CHLORIDE, CALCIUM CHLORIDE 600; 310; 30; 20 MG/100ML; MG/100ML; MG/100ML; MG/100ML
125 INJECTION, SOLUTION INTRAVENOUS CONTINUOUS
Status: DISCONTINUED | OUTPATIENT
Start: 2019-01-01 | End: 2019-01-01 | Stop reason: HOSPADM

## 2019-01-01 RX ORDER — BUPIVACAINE HYDROCHLORIDE 7.5 MG/ML
2 INJECTION, SOLUTION EPIDURAL; RETROBULBAR
Status: COMPLETED | OUTPATIENT
Start: 2019-01-01 | End: 2019-01-01

## 2019-01-01 RX ORDER — BUPROPION HYDROCHLORIDE 150 MG/1
TABLET ORAL
Qty: 90 TABLET | Refills: 1 | Status: SHIPPED | OUTPATIENT
Start: 2019-01-01

## 2019-01-01 RX ORDER — TOPIRAMATE 25 MG/1
25 TABLET ORAL
Qty: 30 TABLET | Refills: 6 | Status: SHIPPED | OUTPATIENT
Start: 2019-01-01 | End: 2019-01-01 | Stop reason: ALTCHOICE

## 2019-01-01 RX ORDER — BETHANECHOL CHLORIDE 25 MG/1
25 TABLET ORAL 4 TIMES DAILY
Qty: 120 TABLET | Refills: 3 | Status: SHIPPED | OUTPATIENT
Start: 2019-01-01

## 2019-01-01 RX ORDER — LIDOCAINE HYDROCHLORIDE 10 MG/ML
INJECTION, SOLUTION INFILTRATION; PERINEURAL AS NEEDED
Status: DISCONTINUED | OUTPATIENT
Start: 2019-01-01 | End: 2019-01-01 | Stop reason: SURG

## 2019-01-01 RX ORDER — DEXTROSE MONOHYDRATE 25 G/50ML
INJECTION, SOLUTION INTRAVENOUS
Status: DISPENSED
Start: 2019-01-01 | End: 2019-01-01

## 2019-01-01 RX ORDER — DIVALPROEX SODIUM 250 MG/1
500 TABLET, DELAYED RELEASE ORAL EVERY 12 HOURS SCHEDULED
Qty: 120 TABLET | Refills: 5 | Status: SHIPPED | OUTPATIENT
Start: 2019-01-01

## 2019-01-01 RX ADMIN — ACETYLCYSTEINE 12720 MG: 200 INJECTION, SOLUTION INTRAVENOUS at 11:38

## 2019-01-01 RX ADMIN — HYDROCORTISONE SODIUM SUCCINATE 50 MG: 100 INJECTION, POWDER, FOR SOLUTION INTRAMUSCULAR; INTRAVENOUS at 05:48

## 2019-01-01 RX ADMIN — CALCIUM CHLORIDE 1 G: 100 INJECTION, SOLUTION INTRAVENOUS; INTRAVENTRICULAR at 20:07

## 2019-01-01 RX ADMIN — BUPIVACAINE HYDROCHLORIDE 2 ML: 7.5 INJECTION, SOLUTION EPIDURAL; RETROBULBAR at 11:25

## 2019-01-01 RX ADMIN — VASOPRESSIN 0.03 UNITS/MIN: 20 INJECTION INTRAVENOUS at 09:30

## 2019-01-01 RX ADMIN — ASCORBIC ACID 1500 MG: 500 INJECTION, SOLUTION INTRAMUSCULAR; INTRAVENOUS; SUBCUTANEOUS at 19:55

## 2019-01-01 RX ADMIN — PANTOPRAZOLE SODIUM 40 MG: 40 INJECTION, POWDER, FOR SOLUTION INTRAVENOUS at 09:00

## 2019-01-01 RX ADMIN — ASCORBIC ACID 1500 MG: 500 INJECTION, SOLUTION INTRAMUSCULAR; INTRAVENOUS; SUBCUTANEOUS at 14:14

## 2019-01-01 RX ADMIN — HEPARIN SODIUM 5000 UNITS: 5000 INJECTION INTRAVENOUS; SUBCUTANEOUS at 14:14

## 2019-01-01 RX ADMIN — LIDOCAINE HYDROCHLORIDE 50 MG: 10 INJECTION, SOLUTION INFILTRATION; PERINEURAL at 09:23

## 2019-01-01 RX ADMIN — FOMEPIZOLE 1300 MG: 1 INJECTION, SOLUTION INTRAVENOUS at 11:59

## 2019-01-01 RX ADMIN — CALCIUM GLUCONATE 1 G: 98 INJECTION, SOLUTION INTRAVENOUS at 19:31

## 2019-01-01 RX ADMIN — Medication 15000 ML: at 04:34

## 2019-01-01 RX ADMIN — Medication 50 MCG/HR: at 01:55

## 2019-01-01 RX ADMIN — VANCOMYCIN HYDROCHLORIDE 1250 MG: 5 INJECTION, POWDER, LYOPHILIZED, FOR SOLUTION INTRAVENOUS at 01:11

## 2019-01-01 RX ADMIN — EPINEPHRINE 25 MCG/MIN: 1 INJECTION, SOLUTION, CONCENTRATE INTRAVENOUS at 22:35

## 2019-01-01 RX ADMIN — SODIUM BICARBONATE 250 ML/HR: 84 INJECTION, SOLUTION INTRAVENOUS at 22:30

## 2019-01-01 RX ADMIN — SODIUM BICARBONATE 50 MEQ: 84 INJECTION PARENTERAL at 22:45

## 2019-01-01 RX ADMIN — NOREPINEPHRINE BITARTRATE 30 MCG/MIN: 1 INJECTION INTRAVENOUS at 20:45

## 2019-01-01 RX ADMIN — CALCIUM GLUCONATE 3 G: 98 INJECTION, SOLUTION INTRAVENOUS at 08:08

## 2019-01-01 RX ADMIN — METHYLENE BLUE 169.5 MG: 5 INJECTION INTRAVENOUS at 20:19

## 2019-01-01 RX ADMIN — CHLORHEXIDINE GLUCONATE 0.12% ORAL RINSE 15 ML: 1.2 LIQUID ORAL at 21:22

## 2019-01-01 RX ADMIN — THIAMINE HYDROCHLORIDE 200 MG: 100 INJECTION, SOLUTION INTRAMUSCULAR; INTRAVENOUS at 17:48

## 2019-01-01 RX ADMIN — CALCIUM CHLORIDE 1 G: 100 INJECTION INTRAVENOUS; INTRAVENTRICULAR at 22:44

## 2019-01-01 RX ADMIN — EPINEPHRINE 7 MCG/MIN: 1 INJECTION, SOLUTION, CONCENTRATE INTRAVENOUS at 08:46

## 2019-01-01 RX ADMIN — DEXTROSE 50 % IN WATER (D50W) INTRAVENOUS SYRINGE 50 ML: at 01:30

## 2019-01-01 RX ADMIN — VASOPRESSIN 0.08 UNITS/MIN: 20 INJECTION INTRAVENOUS at 22:24

## 2019-01-01 RX ADMIN — PROPOFOL 20 MG: 10 INJECTION, EMULSION INTRAVENOUS at 09:23

## 2019-01-01 RX ADMIN — PROPOFOL 10 MG: 10 INJECTION, EMULSION INTRAVENOUS at 09:34

## 2019-01-01 RX ADMIN — ASCORBIC ACID 1500 MG: 500 INJECTION, SOLUTION INTRAMUSCULAR; INTRAVENOUS; SUBCUTANEOUS at 07:01

## 2019-01-01 RX ADMIN — Medication 50 MEQ: at 01:48

## 2019-01-01 RX ADMIN — PHENYLEPHRINE HYDROCHLORIDE 170 MCG/MIN: 10 INJECTION INTRAVENOUS at 22:09

## 2019-01-01 RX ADMIN — METHYLPREDNISOLONE ACETATE 1 ML: 40 INJECTION, SUSPENSION INTRA-ARTICULAR; INTRALESIONAL; INTRAMUSCULAR; SOFT TISSUE at 11:25

## 2019-01-01 RX ADMIN — PHENYLEPHRINE HYDROCHLORIDE 50 MCG/MIN: 10 INJECTION INTRAVENOUS at 11:33

## 2019-01-01 RX ADMIN — IPRATROPIUM BROMIDE 0.5 MG: 0.5 SOLUTION RESPIRATORY (INHALATION) at 19:51

## 2019-01-01 RX ADMIN — CALCIUM GLUCONATE 3 G: 98 INJECTION, SOLUTION INTRAVENOUS at 15:24

## 2019-01-01 RX ADMIN — NOREPINEPHRINE BITARTRATE 25 MCG/MIN: 1 INJECTION INTRAVENOUS at 11:31

## 2019-01-01 RX ADMIN — HEPARIN SODIUM 5000 UNITS: 5000 INJECTION INTRAVENOUS; SUBCUTANEOUS at 01:31

## 2019-01-01 RX ADMIN — EPINEPHRINE 20 MCG/MIN: 1 INJECTION, SOLUTION, CONCENTRATE INTRAVENOUS at 17:09

## 2019-01-01 RX ADMIN — Medication 20000 ML: at 22:26

## 2019-01-01 RX ADMIN — SODIUM BICARBONATE 50 MEQ: 84 INJECTION, SOLUTION INTRAVENOUS at 20:06

## 2019-01-01 RX ADMIN — LEVALBUTEROL HYDROCHLORIDE 1.25 MG: 1.25 SOLUTION, CONCENTRATE RESPIRATORY (INHALATION) at 14:30

## 2019-01-01 RX ADMIN — GADOBUTROL 7 ML: 604.72 INJECTION INTRAVENOUS at 16:54

## 2019-01-01 RX ADMIN — SODIUM BICARBONATE 50 MEQ: 84 INJECTION, SOLUTION INTRAVENOUS at 21:43

## 2019-01-01 RX ADMIN — FOMEPIZOLE 800 MG: 1 INJECTION, SOLUTION INTRAVENOUS at 18:01

## 2019-01-01 RX ADMIN — SODIUM BICARBONATE 50 MEQ: 84 INJECTION, SOLUTION INTRAVENOUS at 01:49

## 2019-01-01 RX ADMIN — SODIUM CHLORIDE 50 ML/HR: 234 INJECTION INTRAMUSCULAR; INTRAVENOUS; SUBCUTANEOUS at 02:28

## 2019-01-01 RX ADMIN — CALCIUM CHLORIDE 2 G: 100 INJECTION, SOLUTION INTRAVENOUS; INTRAVENTRICULAR at 02:31

## 2019-01-01 RX ADMIN — SODIUM BICARBONATE 125 ML/HR: 84 INJECTION, SOLUTION INTRAVENOUS at 11:34

## 2019-01-01 RX ADMIN — Medication 150 MCG/HR: at 09:59

## 2019-01-01 RX ADMIN — ACETYLCYSTEINE 16960 MG: 200 INJECTION, SOLUTION INTRAVENOUS at 17:55

## 2019-01-01 RX ADMIN — FENTANYL CITRATE 100 MCG: 50 INJECTION, SOLUTION INTRAMUSCULAR; INTRAVENOUS at 23:31

## 2019-01-01 RX ADMIN — LEVALBUTEROL HYDROCHLORIDE 1.25 MG: 1.25 SOLUTION, CONCENTRATE RESPIRATORY (INHALATION) at 19:52

## 2019-01-01 RX ADMIN — CALCIUM CHLORIDE 1 G: 100 INJECTION, SOLUTION INTRAVENOUS; INTRAVENTRICULAR at 21:41

## 2019-01-01 RX ADMIN — LIDOCAINE HYDROCHLORIDE 2 ML: 10 INJECTION, SOLUTION INFILTRATION; PERINEURAL at 11:25

## 2019-01-01 RX ADMIN — CEFTRIAXONE SODIUM 1000 MG: 10 INJECTION, POWDER, FOR SOLUTION INTRAVENOUS at 16:18

## 2019-01-01 RX ADMIN — IPRATROPIUM BROMIDE 0.5 MG: 0.5 SOLUTION RESPIRATORY (INHALATION) at 09:03

## 2019-01-01 RX ADMIN — CEFEPIME HYDROCHLORIDE 2000 MG: 2 INJECTION, POWDER, FOR SOLUTION INTRAVENOUS at 23:42

## 2019-01-01 RX ADMIN — HEPARIN SODIUM 5000 UNITS: 5000 INJECTION INTRAVENOUS; SUBCUTANEOUS at 05:48

## 2019-01-01 RX ADMIN — HEPARIN SODIUM 5000 UNITS: 5000 INJECTION INTRAVENOUS; SUBCUTANEOUS at 21:22

## 2019-01-01 RX ADMIN — SODIUM BICARBONATE 50 MEQ: 84 INJECTION, SOLUTION INTRAVENOUS at 21:44

## 2019-01-01 RX ADMIN — HYDROCORTISONE SODIUM SUCCINATE 50 MG: 100 INJECTION, POWDER, FOR SOLUTION INTRAMUSCULAR; INTRAVENOUS at 02:26

## 2019-01-01 RX ADMIN — HYDROCORTISONE SODIUM SUCCINATE 50 MG: 100 INJECTION, POWDER, FOR SOLUTION INTRAMUSCULAR; INTRAVENOUS at 17:44

## 2019-01-01 RX ADMIN — EPINEPHRINE 10 MCG/MIN: 1 INJECTION, SOLUTION, CONCENTRATE INTRAVENOUS at 11:31

## 2019-01-01 RX ADMIN — CHLORHEXIDINE GLUCONATE 0.12% ORAL RINSE 15 ML: 1.2 LIQUID ORAL at 09:00

## 2019-01-01 RX ADMIN — PROPOFOL 150 MG: 10 INJECTION, EMULSION INTRAVENOUS at 09:30

## 2019-01-01 RX ADMIN — VANCOMYCIN HYDROCHLORIDE 500 MG: 500 INJECTION, SOLUTION INTRAVENOUS at 06:10

## 2019-01-01 RX ADMIN — VASOPRESSIN 0.04 UNITS/MIN: 20 INJECTION INTRAVENOUS at 16:31

## 2019-01-01 RX ADMIN — FENTANYL CITRATE 100 MCG: 50 INJECTION, SOLUTION INTRAMUSCULAR; INTRAVENOUS at 03:30

## 2019-01-01 RX ADMIN — LEVALBUTEROL HYDROCHLORIDE 1.25 MG: 1.25 SOLUTION, CONCENTRATE RESPIRATORY (INHALATION) at 09:03

## 2019-01-01 RX ADMIN — SODIUM BICARBONATE 50 MEQ: 84 INJECTION, SOLUTION INTRAVENOUS at 22:30

## 2019-01-01 RX ADMIN — NOREPINEPHRINE BITARTRATE 30 MCG/MIN: 1 INJECTION INTRAVENOUS at 15:49

## 2019-01-01 RX ADMIN — THIAMINE HYDROCHLORIDE 200 MG: 100 INJECTION, SOLUTION INTRAMUSCULAR; INTRAVENOUS at 06:46

## 2019-01-01 RX ADMIN — IPRATROPIUM BROMIDE 0.5 MG: 0.5 SOLUTION RESPIRATORY (INHALATION) at 14:30

## 2019-01-01 RX ADMIN — CHLORHEXIDINE GLUCONATE 0.12% ORAL RINSE 15 ML: 1.2 LIQUID ORAL at 01:31

## 2019-01-01 RX ADMIN — ACETYLCYSTEINE 8480 MG: 200 INJECTION, SOLUTION INTRAVENOUS at 12:52

## 2019-01-01 RX ADMIN — SODIUM BICARBONATE 50 MEQ: 84 INJECTION, SOLUTION INTRAVENOUS at 01:48

## 2019-01-01 RX ADMIN — SODIUM CHLORIDE, SODIUM GLUCONATE, SODIUM ACETATE, POTASSIUM CHLORIDE AND MAGNESIUM CHLORIDE 1000 ML: 526; 502; 368; 37; 30 INJECTION, SOLUTION INTRAVENOUS at 01:14

## 2019-01-01 RX ADMIN — SODIUM BICARBONATE 250 ML/HR: 84 INJECTION, SOLUTION INTRAVENOUS at 18:07

## 2019-01-01 RX ADMIN — EPINEPHRINE 5 MCG/MIN: 1 INJECTION, SOLUTION, CONCENTRATE INTRAVENOUS at 23:15

## 2019-01-01 RX ADMIN — SODIUM BICARBONATE 125 ML/HR: 84 INJECTION, SOLUTION INTRAVENOUS at 02:22

## 2019-01-01 RX ADMIN — CEFEPIME HYDROCHLORIDE 2000 MG: 2 INJECTION, POWDER, FOR SOLUTION INTRAVENOUS at 11:52

## 2019-01-01 RX ADMIN — FENTANYL CITRATE 100 MCG: 50 INJECTION INTRAMUSCULAR; INTRAVENOUS at 02:11

## 2019-01-01 RX ADMIN — NOREPINEPHRINE BITARTRATE 5 MCG/MIN: 1 INJECTION INTRAVENOUS at 01:06

## 2019-01-01 RX ADMIN — SODIUM CHLORIDE, SODIUM LACTATE, POTASSIUM CHLORIDE, AND CALCIUM CHLORIDE: .6; .31; .03; .02 INJECTION, SOLUTION INTRAVENOUS at 09:15

## 2019-01-01 RX ADMIN — HYDROCORTISONE SODIUM SUCCINATE 50 MG: 100 INJECTION, POWDER, FOR SOLUTION INTRAMUSCULAR; INTRAVENOUS at 14:14

## 2019-01-01 RX ADMIN — PROPOFOL 30 MG: 10 INJECTION, EMULSION INTRAVENOUS at 09:32

## 2019-01-01 RX ADMIN — SODIUM BICARBONATE 50 MEQ: 84 INJECTION, SOLUTION INTRAVENOUS at 20:07

## 2019-01-01 SDOH — ECONOMIC STABILITY - HOUSING INSECURITY: HOMELESSNESS UNSPECIFIED: Z59.00

## 2019-02-19 NOTE — ED NOTES
Patient ambulated to restroom, back in room, resting, breathing easy     Marie Gandara RN  02/19/19 0498

## 2019-02-19 NOTE — ED PROVIDER NOTES
History  Chief Complaint   Patient presents with    Psychiatric Evaluation     As per EMS, [address] visited patient at home and asked if she had sucidial thoughts, patient admitted to previous sucidial thoughts in the past and said she got help for but denies SI today, nurse decided call 36      HPI  59 yo F with PMH anxiety, depression, RA presents with feelings of depression and anxiety  Иван nurse visited patient called 911 to have patient evaluated at the hospital  States she had felt suicidal in the past but not currently  She denies hallucinations  States she feels depressed because she is going to be forced out of her house soon  Prior to Admission Medications   Prescriptions Last Dose Informant Patient Reported? Taking?    Adalimumab (HUMIRA PEN) 40 MG/0 8ML PNKT  Self Yes No   Sig: Inject under the skin   Calcium Carb-Cholecalciferol (CALCIUM 600+D3) 600-200 MG-UNIT TABS   Yes No   Sig: Take 2 tablets by mouth daily   DULoxetine (CYMBALTA) 30 mg delayed release capsule  Self Yes No   Sig: Take 30 mg by mouth daily   DULoxetine (CYMBALTA) 60 mg delayed release capsule  Self Yes No   Sig: Take 60 mg by mouth daily     Lactobacillus (PROBIOTIC ACIDOPHILUS PO)  Self Yes No   Sig: Take 1 capsule by mouth daily   albuterol (VENTOLIN HFA) 90 mcg/act inhaler  Self Yes No   Sig: Inhale 2 puffs 2 (two) times a day     alendronate (FOSAMAX) 70 mg tablet   Yes No   Sig: Take 70 mg by mouth Once a week   aspirin (ECOTRIN LOW STRENGTH) 81 mg EC tablet   Yes No   Si tab(s) daily   aspirin 81 MG tablet  Self Yes No   Sig: Take 1 tablet by mouth daily   atorvastatin (LIPITOR) 40 mg tablet  Self Yes No   Sig: Take 1 tablet by mouth   bethanechol (URECHOLINE) 25 mg tablet  Self Yes No   Sig: Take 1 tablet by mouth 2 (two) times a day     buPROPion (WELLBUTRIN XL) 150 mg 24 hr tablet   No No   Sig: TAKE 1 TABLET BY MOUTH EVERY DAY   butalbital-acetaminophen-caffeine (FIORICET,ESGIC) -40 mg per tablet No No   Sig: Take 1 tablet by mouth 2 (two) times a day as needed for migraine   clonazePAM (KlonoPIN) 0 5 mg tablet  Self Yes No   Sig: Take 1 tablet by mouth as needed     eszopiclone (LUNESTA) 2 mg tablet  Self Yes No   Sig: TAKE 1 TABLET (2 MG) BY MOUTH DAILY IMMEDIATELY BEFORE BEDTIME   fluticasone (FLONASE) 50 mcg/act nasal spray  Self No No   Si spray into each nostril daily   gabapentin (NEURONTIN) 300 mg capsule  Self Yes No   Sig: Take 300 mg by mouth daily     lidocaine (XYLOCAINE) 5 % ointment  Self Yes No   Sig: apply locally DAILY prn   lisinopril (ZESTRIL) 40 mg tablet  Self Yes No   Sig: Take 1 tablet by mouth daily   meclizine (ANTIVERT) 25 mg tablet   No No   Sig: TAKE 1 TABLET (25 MG TOTAL) BY MOUTH EVERY 12 (TWELVE) HOURS AS NEEDED FOR DIZZINESS   metroNIDAZOLE (METROGEL) 1 % gel  Self Yes No   Sig: Apply topically as needed     mometasone (NASONEX) 50 mcg/act nasal spray  Self Yes No   Sig: into each nostril   montelukast (SINGULAIR) 10 mg tablet  Self Yes No   Sig: Take 1 tablet by mouth daily   orphenadrine (NORFLEX) 100 mg tablet  Self Yes No   Sig: Take 100 mg by mouth 2 (two) times a day   orphenadrine (NORFLEX) 100 mg tablet   No No   Sig: TAKE 1 TABLET (100 MG TOTAL) BY MOUTH DAILY AT BEDTIME FOR 30 DAYS   pantoprazole (PROTONIX) 40 mg tablet  Self Yes No   Sig: Take 1 tablet by mouth 2 (two) times a day     primidone (MYSOLINE) 50 mg tablet   No No   Sig: TAKE 1 TABLET BY MOUTH TWICE A DAY   topiramate (TOPAMAX) 50 MG tablet   No No   Sig: Take 1 tablet (50 mg total) by mouth daily at bedtime      Facility-Administered Medications: None       Past Medical History:   Diagnosis Date    Anxiety     Cancer of skin of abdomen     Cardiomyopathy (HCC)     Carpal tunnel syndrome     Cervical herniated disc     Chronic migraine without aura without status migrainosus, not intractable     Chronic sinusitis     Compression fracture of lumbar spine, non-traumatic (HCC)     COPD (chronic obstructive pulmonary disease) (HCC)     Depression     Fibroids     GERD (gastroesophageal reflux disease)     Hepatitis C antibody test positive     No detectable HCV RNA    Hypercholesteremia     Hypertension     Liver hemangioma     Lumbar disc herniation     Nasal turbinate hypertrophy     Rheumatoid arthritis (HCC)     RLS (restless legs syndrome)     Tension headache        Past Surgical History:   Procedure Laterality Date    CHOLECYSTECTOMY      1/22/2016    COLONOSCOPY  11/22/2011    Dr Kellen Grande Left 05/28/2014    FOOT SURGERY Right 09/17/2013    2nd toe    FOOT SURGERY Bilateral 11/08/2017    HAND SURGERY  05/23/2016    Right index finger and knuckle repaired    KNEE SURGERY Right 07/10/2012    LARYNGOSCOPY  05/15/2012    direct laryngoscopy with stripping of vocal cords    LARYNGOSCOPY  04/17/2012    direct laryngoscopy with stripping of vocal cords    MOHS SURGERY  02/27/2015    Trunk/Abdomen    NASAL SEPTUM SURGERY  03/05/2013    NEUROPLASTY / TRANSPOSITION MEDIAN NERVE AT CARPAL TUNNEL Right 03/09/2015    ROTATOR CUFF REPAIR Left 10/23/2012    SHOULDER ARTHROPLASTY      total shoulder replacement-right-9/22/2016    SINUS SURGERY  05/16/2017    Dr Jonn[facility] Land [facility]       Family History   Problem Relation Age of Onset    Heart disease Mother         cardiac pacemaker    Lymphoma Father     Cancer Sister     Stroke Maternal Grandfather     Cancer Family     Other Family         bone issues     I have reviewed and agree with the history as documented  Social History     Tobacco Use    Smoking status: Current Every Day Smoker     Packs/day: 0 50     Years: 26 00     Pack years: 13 00     Types: Cigarettes     Start date: 11    Smokeless tobacco: Never Used   Substance Use Topics    Alcohol use: Yes     Comment: social    Drug use: No        Review of Systems   Constitutional: Negative for chills and fever     HENT: Negative for dental problem and ear pain  Eyes: Negative for pain and redness  Respiratory: Negative for cough and shortness of breath  Cardiovascular: Negative for chest pain and palpitations  Gastrointestinal: Negative for abdominal pain and nausea  Endocrine: Negative for polydipsia and polyphagia  Genitourinary: Negative for dysuria and frequency  Musculoskeletal: Negative for arthralgias and joint swelling  Skin: Negative for color change and rash  Neurological: Negative for dizziness and headaches  Psychiatric/Behavioral: Positive for dysphoric mood  Negative for behavioral problems and confusion  The patient is nervous/anxious  All other systems reviewed and are negative  Physical Exam  Physical Exam   Constitutional: She is oriented to person, place, and time  She appears well-developed and well-nourished  No distress  Eating a tastycake in no acute distress   HENT:   Head: Atraumatic  Right Ear: External ear normal    Left Ear: External ear normal    Nose: Nose normal    Eyes: Pupils are equal, round, and reactive to light  Conjunctivae and EOM are normal    Neck: Normal range of motion  Neck supple  No JVD present  Cardiovascular: Normal rate, regular rhythm and normal heart sounds  No murmur heard  Pulmonary/Chest: Effort normal and breath sounds normal  No respiratory distress  She has no wheezes  Abdominal: Soft  Bowel sounds are normal  She exhibits no distension  There is no tenderness  Musculoskeletal: Normal range of motion  She exhibits no edema  Neurological: She is alert and oriented to person, place, and time  No cranial nerve deficit  Skin: Skin is warm and dry  Capillary refill takes less than 2 seconds  She is not diaphoretic  Psychiatric: She has a normal mood and affect  Her behavior is normal    Nursing note and vitals reviewed        Vital Signs  ED Triage Vitals   Temperature Pulse Respirations Blood Pressure SpO2   02/19/19 1522 02/19/19 1531 02/19/19 1531 02/19/19 1531 02/19/19 1531   98 4 °F (36 9 °C) 72 18 143/70 98 %      Temp Source Heart Rate Source Patient Position - Orthostatic VS BP Location FiO2 (%)   02/19/19 1522 02/19/19 1522 02/19/19 1531 02/19/19 1522 --   Oral Monitor Sitting Right arm       Pain Score       02/19/19 1531       7           Vitals:    02/19/19 1531 02/19/19 1651   BP: 143/70 144/73   Pulse: 72 74   Patient Position - Orthostatic VS: Sitting Lying       Visual Acuity      ED Medications  Medications - No data to display    Diagnostic Studies  Results Reviewed     Procedure Component Value Units Date/Time    Rapid drug screen, urine [[id number]]  (Abnormal) Collected:  02/19/19 1544    Lab Status:  Final result Specimen:  Urine, Clean Catch Updated:  02/19/19 1600     Amph/Meth UR Positive     Barbiturate Ur Positive     Benzodiazepine Urine Negative     Cocaine Urine Negative     Methadone Urine Negative     Opiate Urine Negative     PCP Ur Negative     THC Urine Positive    Narrative:       Presumptive report  If requested, specimen will be sent to reference lab for confirmation  FOR MEDICAL PURPOSES ONLY  IF CONFIRMATION NEEDED PLEASE CONTACT THE LAB WITHIN 5 DAYS  Drug Screen Cutoff Levels:  AMPHETAMINE/METHAMPHETAMINES  1000 ng/mL  BARBITURATES     200 ng/mL  BENZODIAZEPINES     200 ng/mL  COCAINE      300 ng/mL  METHADONE      300 ng/mL  OPIATES      300 ng/mL  PHENCYCLIDINE     25 ng/mL  THC       50 ng/mL    POCT alcohol breath test [[id number]]  (Normal) Resulted:  02/19/19 1550    Lab Status:  Final result Updated:  02/19/19 1551     EXTBreath Alcohol 0                 No orders to display              Procedures  Procedures       Phone Contacts  ED Phone Contact    ED Course                               MDM  59 yo F presents after visiting nurse took out 302 for suicidal ideation  Patient denies feeling suicidal  She has been under stress from needing to vacate her home later this month   Crisis has evaluated and agree with plan to deny 302 as patient is not a harm to herself or others at this time  She has follow up with her psychiatrist outpatient  Disposition  Final diagnoses:   Encounter for psychological evaluation     Time reflects when diagnosis was documented in both MDM as applicable and the Disposition within this note     Time User Action Codes Description Comment    2/19/2019  7:45 PM Venora Heart Add [Z00 8] Encounter for psychological evaluation       ED Disposition     ED Disposition Condition Date/Time Comment    Discharge Stable Tue Feb 19, 2019  7:45 PM Mary Neighbor discharge to home/self care              MD Documentation      Most Recent Value   Sending MD  Dr Hansen Cluster up With Specialties Details Why Contact Info    Miroslava Jc, DO Internal Medicine  As needed [address]  [phone]            Discharge Medication List as of 2/19/2019  7:45 PM      CONTINUE these medications which have NOT CHANGED    Details   Adalimumab (HUMIRA PEN) 40 MG/0 8ML PNKT Inject under the skin, Historical Med      albuterol (VENTOLIN HFA) 90 mcg/act inhaler Inhale 2 puffs 2 (two) times a day  , Starting Tue 10/4/2011, Historical Med      alendronate (FOSAMAX) 70 mg tablet Take 70 mg by mouth Once a week, Starting Fri 11/9/2018, Until Sat 11/9/2019, Historical Med      aspirin (ECOTRIN LOW STRENGTH) 81 mg EC tablet 1 tab(s) daily, Historical Med      aspirin 81 MG tablet Take 1 tablet by mouth daily, Historical Med      atorvastatin (LIPITOR) 40 mg tablet Take 1 tablet by mouth, Historical Med      bethanechol (URECHOLINE) 25 mg tablet Take 1 tablet by mouth 2 (two) times a day  , Historical Med      buPROPion (WELLBUTRIN XL) 150 mg 24 hr tablet TAKE 1 TABLET BY MOUTH EVERY DAY, Normal      butalbital-acetaminophen-caffeine (FIORICET,ESGIC) -40 mg per tablet Take 1 tablet by mouth 2 (two) times a day as needed for migraine, Starting Fri 10/26/2018, Normal      Calcium Carb-Cholecalciferol (CALCIUM 600+D3) 600-200 MG-UNIT TABS Take 2 tablets by mouth daily, Historical Med      clonazePAM (KlonoPIN) 0 5 mg tablet Take 1 tablet by mouth as needed  , Historical Med      !! DULoxetine (CYMBALTA) 30 mg delayed release capsule Take 30 mg by mouth daily, Historical Med      !!  DULoxetine (CYMBALTA) 60 mg delayed release capsule Take 60 mg by mouth daily  , Historical Med      eszopiclone (LUNESTA) 2 mg tablet TAKE 1 TABLET (2 MG) BY MOUTH DAILY IMMEDIATELY BEFORE BEDTIME, Historical Med      fluticasone (FLONASE) 50 mcg/act nasal spray 1 spray into each nostril daily, Starting Wed 7/25/2018, Normal      gabapentin (NEURONTIN) 300 mg capsule Take 300 mg by mouth daily  , Historical Med      Lactobacillus (PROBIOTIC ACIDOPHILUS PO) Take 1 capsule by mouth daily, Historical Med      lidocaine (XYLOCAINE) 5 % ointment apply locally DAILY prn, Historical Med      lisinopril (ZESTRIL) 40 mg tablet Take 1 tablet by mouth daily, Historical Med      meclizine (ANTIVERT) 25 mg tablet TAKE 1 TABLET (25 MG TOTAL) BY MOUTH EVERY 12 (TWELVE) HOURS AS NEEDED FOR DIZZINESS, Starting Wed 2/13/2019, Normal      metroNIDAZOLE (METROGEL) 1 % gel Apply topically as needed  , Starting Mon 10/17/2011, Historical Med      mometasone (NASONEX) 50 mcg/act nasal spray into each nostril, Historical Med      montelukast (SINGULAIR) 10 mg tablet Take 1 tablet by mouth daily, Historical Med      !! orphenadrine (NORFLEX) 100 mg tablet Take 100 mg by mouth 2 (two) times a day, Historical Med      !! orphenadrine (NORFLEX) 100 mg tablet TAKE 1 TABLET (100 MG TOTAL) BY MOUTH DAILY AT BEDTIME FOR 30 DAYS, Starting Mon 2/18/2019, Until Wed 3/20/2019, Normal      pantoprazole (PROTONIX) 40 mg tablet Take 1 tablet by mouth 2 (two) times a day  , Historical Med      primidone (MYSOLINE) 50 mg tablet TAKE 1 TABLET BY MOUTH TWICE A DAY, Normal      topiramate (TOPAMAX) 50 MG tablet Take 1 tablet (50 mg total) by mouth daily at bedtime, Starting Fri 10/26/2018, Normal       !! - Potential duplicate medications found  Please discuss with provider  No discharge procedures on file      ED Provider  Electronically Signed by           Mercedes Gonzalez MD  02/19/19 5686 I personally performed the service described in the documentation recorded by the scribe in my presence, and it accurately and completely records my words and actions.

## 2019-02-19 NOTE — ED NOTES
Pt in bed resting soundly with eyes closed, breathing easy     Marlyse Gurinder, JOJO  02/19/19 0318

## 2019-02-20 NOTE — DISCHARGE INSTRUCTIONS
Call 911 if you feel like hurting yourself or others, follow up with your doctor for recheck this week as needed

## 2019-02-20 NOTE — ED NOTES
Pt presents to the ED on a 302 petitioned by pt's visiting nurse stating that pt is experiencing suicidal ideations with a plan to OD on her meds  Pt denies this claim, but admits to discussing how stressful her life is due to needing to vacte her home on Feb 27 due to it being declared uninhabitable and also having a very strained relationship with her daughter  Pt adds that her daughter's children are placed in foster care as daughter is a drug addict and blames mother for losing her children  Pt states she feels abandonned by her family and alone, adding that she has no vehicle either to get around  Pt denies any suicidal or homicidal ideations, nor any auditory or visual hallucinations at this time  Pt reports using Marijuana daily since she was 12 and adds that she was charged with Conspiracy in 1992, but has no current legal charges  Pt denies any Hx of abuse or trauma  Pt states having been to [facility] in 2015 and currently sees Dr Janice Monteiro for med management  CW discussed Tx options with pt who is not interested in in-pt Tx  CW discussed this case with Dr Shade Richey who is in agreement with D/C'ing pt home to f/u with her out-pt Tx services  Dr Shade Richey denied the 36 petition

## 2019-02-26 NOTE — PROGRESS NOTES
Left message with Mary's adult daughter  She stated that Joe Manners is busy at the moment  Took down care management contact information  Will wait return call

## 2019-03-05 NOTE — PROGRESS NOTES
[facility]  Yearly NCQS assessment completed  She stated that she is feeling well  Is getting adjusted to a new place that she moved into  Stated that she has roommates and they get along great so far  Only family around is her adult daughter but does not get along great with her  Stated that she has some friends though that are emotionally supportive  She stated that she is doing better emotionally now that she moved into her new place and is also talking to a psychiatrist  Did review over upcoming appointment with Dr Scotty Nielsen (PCP) on 4/10/19  She stated that she will be attending  Did stress importance of adhering to her f/u appointments due to her many cancellations and no shows  She stated that she understands and at the time had issues with transportation and also the weather was bad at times and did not attend  She stated that at this point she is getting her car back so does not need assistance with transportation to her f/u appointment  Did stress importance of calling care management or PCP office if she does not get car back in time for appointment  She verbalized understanding  She has been tolerating her medications  Asked her if is she had issues obtaining medication; she does not  Stated that her deductible is low  Stated that she feels that her health is over all good  Admitted to smoking but is trying to cut back  Did congratulate her on cutting back and provided much encouragement to keep cutting back  She stated that she is smoking 11 cigarettes a day  Did discuss with her smoking cessation  Asked her if she was interested in information and or if she needs assistance with quitting  She stated that she is doing fine managing it herself by cutting back  She stated that she used to smoke a lot more just a month ago  Asked her if she was familiar with [employer] benefits and if she wanted me to go through it with her  She denied at this time   Provided her with care management contact information  She currently has no questions or concerns at this time

## 2019-03-14 PROBLEM — R91.8 MULTIPLE LUNG NODULES ON CT: Status: ACTIVE | Noted: 2019-01-01

## 2019-03-14 NOTE — PROGRESS NOTES
INTERNAL MEDICINE FOLLOW-UP OFFICE VISIT  [facility] [address] [place] [address]    NAME: Mary Orosco  AGE: 60 y o  SEX: female  : 1960     DATE: 3/14/2019     Assessment and Plan:     1  Adjustment disorder with mixed anxiety and depressed mood    She is stable on medication eats and is now feeling better after going through a recent stressful adjustment  With finding a place to live  She should continue to follow with her psychiatrist   I think she would benefit from counseling as well  Will set her up with our licensed clinical   - Ambulatory referral to Jarod Nieves; Future     Chief Complaint:     Chief Complaint   Patient presents with    Mental Health Problem      History of Present Illness:     Patient has underlying depression, anxiety, insomnia  She follows up regularly with her psychiatrist   Patient recently had a [facility] nurse come to her house and they were discussing some stressful life event she was going through  Patient was having a lot of stress over moving and she was unsure if she was going to find a place to movement if she was going to be able to keep her cat  The [facility] Diane ORDONEZ had discussed with patient whether she had suicidal ideations and the patient states that the guiding her nurse lied about this to try to get her 302'ed  Patient stated she had suicidal ideations in the past but none recently or currently  She ultimately did not meet criteria for inpatient admission  She just followed up with Dr Dany Bassett yesterday and her medications remain stable  She is feeling much better and she got moved into a new home which she really loves  She got to keep her cat      The following portions of the patient's history were reviewed and updated as appropriate: allergies, current medications, past family history, past medical history, past social history, past surgical history and problem list      Review of Systems: Review of Systems   Constitutional: Negative for activity change, appetite change and fatigue  Respiratory: Negative for apnea, cough, chest tightness, shortness of breath and wheezing  Cardiovascular: Negative for chest pain, palpitations and leg swelling  Gastrointestinal: Negative for abdominal distention, abdominal pain, blood in stool, constipation, diarrhea, nausea and vomiting  Neurological: Negative for dizziness, weakness, light-headedness, numbness and headaches  Psychiatric/Behavioral: Positive for sleep disturbance  Negative for behavioral problems, confusion, hallucinations and suicidal ideas  The patient is nervous/anxious  Problem List:     Patient Active Problem List   Diagnosis    Anxiety    Cardiomyopathy ([facility] )    Atherosclerotic heart disease of native coronary artery without angina pectoris    COPD (chronic obstructive pulmonary disease) ([facility] )    Essential hypertension    GERD (gastroesophageal reflux disease)    Hypercholesteremia    Nicotine dependence    Rheumatoid arthritis with positive rheumatoid factor (HCC)    Chronic migraine    Chronic migraine without aura without status migrainosus, not intractable    Osteoporosis of forearm    Multiple lung nodules on CT      Objective:     /80 (BP Location: Left arm, Patient Position: Sitting, Cuff Size: Standard)   Pulse 88   Wt 76 7 kg (169 lb) Comment: w/ shoe denied off  LMP  (LMP Unknown)   SpO2 97%   BMI 26 47 kg/m²     Physical Exam   Constitutional: She appears well-developed and well-nourished  No distress  Cardiovascular: Normal rate and regular rhythm  No murmur heard  Pulmonary/Chest: Effort normal and breath sounds normal  No respiratory distress  Skin: She is not diaphoretic  Psychiatric: She has a normal mood and affect       Mariella Caballero DO  [facility]

## 2019-03-14 NOTE — PATIENT INSTRUCTIONS
Anxiety, Ambulatory Care   GENERAL INFORMATION:   Anxiety  is a condition that causes you to feel excessive worry, uneasiness, or fear  Family or work stress, smoking, caffeine, and alcohol can increase your risk for anxiety  Certain medicines or health conditions can also increase your risk  Anxiety may begin gradually and can become a long-term condition if it is not managed or treated  Common symptoms include the following:   · Fatigue or muscle tightness     · Shaking, restlessness, or irritability     · Problems focusing     · Trouble sleeping     · Feeling jumpy, easily startled, or dizzy     · Rapid heartbeat or shortness of breath  Seek immediate care for the following symptoms:   · Chest pain, tightness, or heaviness that may spread to your shoulders, arms, jaw, neck, or back    · Feeling like hurting yourself or someone else    · Dizziness or feeling lightheaded or faint  Treatment for anxiety  may include medicines to help you feel calm and relaxed, and decrease your symptoms  Healthcare providers will treat any medical conditions that may be causing your symptoms  Manage anxiety:   · Go to counseling as directed  Cognitive behavioral therapy can help you understand and change how you react to events that trigger your symptoms  · Find ways to manage your symptoms  Activities such as exercise, meditation, or listening to music can help you relax  · Practice deep breathing  Breathing can change how your body reacts to stress  Focus on taking slow, deep breaths several times a day, or during an anxiety attack  Breathe in through your nose, and out through your mouth  · Avoid caffeine  Caffeine can make your symptoms worse  Avoid foods or drinks that are meant to increase your energy level  · Limit or avoid alcohol  Ask your healthcare provider if alcohol is safe for you  You may not be able to drink alcohol if you take certain anxiety or depression medicines   Limit alcohol to 1 drink per day if you are a woman  Limit alcohol to 2 drinks per day if you are a man  A drink of alcohol is 12 ounces of beer, 5 ounces of wine, or 1½ ounces of liquor  Follow up with your healthcare provider as directed:  Write down your questions so you remember to ask them during your visits  CARE AGREEMENT:   You have the right to help plan your care  Learn about your health condition and how it may be treated  Discuss treatment options with your caregivers to decide what care you want to receive  You always have the right to refuse treatment  The above information is an  only  It is not intended as medical advice for individual conditions or treatments  Talk to your doctor, nurse or pharmacist before following any medical regimen to see if it is safe and effective for you  © 2014 [address] is for End User's use only and may not be sold, redistributed or otherwise used for commercial purposes  All illustrations and images included in CareNotes® are the copyrighted property of [employer]  or Richard Cano

## 2019-03-19 NOTE — PROGRESS NOTES
CHIEF COMPLAINT:  Chief Complaint   Patient presents with    Left Hand - Pain    Right Hand - Pain       SUBJECTIVE:  Madisyn Vick is a 60y o  year old  female who presents to the office for pain in the base of her left thumb  Pt states that it has been going on for a long time  Pt has been treated with CSI in the past at [facility] [address]  Pt also has a new issue of  pain and clicking in her left long finger that has been going on for about 3 months  Pt has not had previous treatment for this issue  Pt is requesting her records [facility]        PAST MEDICAL HISTORY:  Past Medical History:   Diagnosis Date    Anxiety     Cancer of skin of abdomen     Cardiomy[facility] ([facility] [address] )     Carpal tunnel syndrome     Cervical herniated disc     Chronic migraine without aura without status migrainosus, not intractable     Chronic sinusitis     Compression fracture of lumbar spine, non-traumatic (HCC)     COPD (chronic obstructive pulmonary disease) (HCC)     Depression     Fibroids     GERD (gastroesophageal reflux disease)     Hepatitis C antibody test positive     No detectable HCV RNA    Hypercholesteremia     Hypertension     Liver hemangioma     Lumbar disc herniation     Nasal turbinate hypertrophy     Rheumatoid arthritis (HCC)     RLS (restless legs syndrome)     Tension headache        PAST SURGICAL HISTORY:  Past Surgical History:   Procedure Laterality Date    CARPAL TUNNEL RELEASE Bilateral     CHOLECYSTECTOMY      1/22/2016    COLONOSCOPY  11/22/2011    Dr White Fret Left 05/28/2014    FOOT SURGERY Right 09/17/2013    2nd toe    FOOT SURGERY Bilateral 11/08/2017    HAND SURGERY  05/23/2016    Right index finger and knuckle repaired    KNEE SURGERY Right 07/10/2012    LARYNGOSCOPY  05/15/2012    direct laryngoscopy with stripping of vocal cords    LARYNGOSCOPY  04/17/2012    direct laryngoscopy with stripping of vocal cords    MOHS SURGERY  02/27/2015    Trunk/Abdomen    NASAL SEPTUM SURGERY  03/05/2013    NEUROPLASTY / TRANSPOSITION MEDIAN NERVE AT CARPAL TUNNEL Right 03/09/2015    ROTATOR CUFF REPAIR Left 10/23/2012    SHOULDER ARTHROPLASTY      total shoulder replacement-right-9/22/2016    SINUS SURGERY  05/16/2017    Dr [facility]       FAMILY HISTORY:  Family History   Problem Relation Age of Onset    Heart disease Mother         cardiac pacemaker    Lymphoma Father     Cancer Sister     Stroke Maternal Grandfather     Cancer Family     Other Family         bone issues    Breast cancer Sister        SOCIAL HISTORY:  Social History     Tobacco Use    Smoking status: Current Every Day Smoker     Packs/day: 0 50     Years: 26 00     Pack years: 13 00     Types: Cigarettes     Start date: 1992    Smokeless tobacco: Never Used   Substance Use Topics    Alcohol use: Yes     Frequency: Monthly or less     Drinks per session: 3 or 4     Binge frequency: Never     Comment: social    Drug use: No       MEDICATIONS:    Current Outpatient Medications:     Adalimumab (HUMIRA PEN) 40 MG/0 8ML PNKT, Inject under the skin, Disp: , Rfl:     albuterol (VENTOLIN HFA) 90 mcg/act inhaler, Inhale 2 puffs 2 (two) times a day  , Disp: , Rfl:     alendronate (FOSAMAX) 70 mg tablet, Take 70 mg by mouth Once a week, Disp: , Rfl:     aspirin 81 MG tablet, Take 1 tablet by mouth daily, Disp: , Rfl:     atorvastatin (LIPITOR) 40 mg tablet, Take 1 tablet by mouth, Disp: , Rfl:     bethanechol (URECHOLINE) 25 mg tablet, Take 1 tablet by mouth 2 (two) times a day  , Disp: , Rfl:     BREO ELLIPTA 200-25 MCG/INH inhaler, Inhale 2 puffs daily , Disp: , Rfl:     buPROPion (WELLBUTRIN XL) 150 mg 24 hr tablet, Take 1 tablet (150 mg total) by mouth daily, Disp: 90 tablet, Rfl: 1    butalbital-acetaminophen-caffeine (FIORICET,ESGIC) -40 mg per tablet, Take 1 tablet by mouth 2 (two) times a day as needed for migraine, Disp: 40 tablet, Rfl: 1    Calcium Carb-Cholecalciferol (CALCIUM 600+D3) 600-200 MG-UNIT TABS, Take 2 tablets by mouth daily, Disp: , Rfl:     clonazePAM (KlonoPIN) 0 5 mg tablet, Take 1 tablet by mouth as needed  , Disp: , Rfl:     DULoxetine (CYMBALTA) 30 mg delayed release capsule, Take 30 mg by mouth daily, Disp: , Rfl:     DULoxetine (CYMBALTA) 60 mg delayed release capsule, Take 60 mg by mouth daily  , Disp: , Rfl:     ergocalciferol (VITAMIN D2) 50,000 units, TAKE ONE CAPSULE BY MOUTH ONE TIME PER WEEK, Disp: , Rfl:     eszopiclone (LUNESTA) 2 mg tablet, TAKE 1 TABLET (2 MG) BY MOUTH DAILY IMMEDIATELY BEFORE BEDTIME, Disp: , Rfl: 0    fluticasone (FLONASE) 50 mcg/act nasal spray, 1 spray into each nostril daily, Disp: 16 g, Rfl: 3    Lactobacillus (PROBIOTIC ACIDOPHILUS PO), Take 1 capsule by mouth daily, Disp: , Rfl:     lidocaine (XYLOCAINE) 5 % ointment, apply locally DAILY prn, Disp: , Rfl: 2    lisinopril (ZESTRIL) 40 mg tablet, Take 1 tablet by mouth daily, Disp: , Rfl:     meclizine (ANTIVERT) 25 mg tablet, TAKE 1 TABLET (25 MG TOTAL) BY MOUTH EVERY 12 (TWELVE) HOURS AS NEEDED FOR DIZZINESS, Disp: 60 tablet, Rfl: 3    meloxicam (MOBIC) 15 mg tablet, Take 15 mg by mouth daily , Disp: , Rfl:     metroNIDAZOLE (METROGEL) 1 % gel, Apply topically as needed  , Disp: , Rfl:     mometasone (NASONEX) 50 mcg/act nasal spray, 2 sprays into each nostril as needed , Disp: , Rfl:     orphenadrine (NORFLEX) 100 mg tablet, TAKE 1 TABLET (100 MG TOTAL) BY MOUTH DAILY AT BEDTIME FOR 30 DAYS, Disp: 30 tablet, Rfl: 1    pantoprazole (PROTONIX) 40 mg tablet, Take 1 tablet by mouth 2 (two) times a day  , Disp: , Rfl:     primidone (MYSOLINE) 50 mg tablet, TAKE 1 TABLET BY MOUTH TWICE A DAY, Disp: 180 tablet, Rfl: 1    topiramate (TOPAMAX) 50 MG tablet, Take 1 tablet (50 mg total) by mouth daily at bedtime, Disp: 30 tablet, Rfl: 6    ALLERGIES:  Allergies   Allergen Reactions    Clindamycin Rash    Pollen Extract Itching and Sneezing       REVIEW OF SYSTEMS:  Review of Systems   Constitutional: Negative for chills, fever and unexpected weight change  HENT: Negative for hearing loss, nosebleeds and sore throat  Eyes: Negative for pain, redness and visual disturbance  Respiratory: Negative for cough, shortness of breath and wheezing  Cardiovascular: Negative for chest pain, palpitations and leg swelling  Gastrointestinal: Negative for abdominal pain, nausea and vomiting  Endocrine: Negative for polydipsia and polyuria  Genitourinary: Negative for dysuria and hematuria  Musculoskeletal: Positive for arthralgias  Negative for joint swelling and myalgias  Skin: Negative for rash and wound  Neurological: Negative for dizziness, numbness and headaches  Psychiatric/Behavioral: Negative for decreased concentration, dysphoric mood and suicidal ideas  The patient is not nervous/anxious          VITALS:  Vitals:    03/19/19 1022   BP: 109/74   Pulse: 71       LABS:  HgA1c: No results found for: HGBA1C  BMP: No results found for: GLUCOSE, CALCIUM, NA, K, CO2, CL, BUN, CREATININE    _____________________________________________________  PHYSICAL EXAMINATION:  General: well developed and well nourished, alert, oriented times 3 and appears comfortable  Psychiatric: Normal  HEENT: Trachea Midline, No torticollis  Pulmonary: No audible wheezing or respiratory distress   Skin: No masses, erythema, lacerations, fluctation, ulcerations  Neurovascular: Sensation Intact to the Median, Ulnar, Radial Nerve, Motor Intact to the Median, Ulnar, Radial Nerve and Pulses Intact    MUSCULOSKELETAL EXAMINATION:  left CMC Exam:  No adduction contracture  No hyperextension deformity of MCP joint  Positive localized tenderness over radial and dorsal aspect of thumb (CMC joint)  Grind test is Positive for pain and Positive for crepitus  No triggering or tenderness over the A1 pulley  No pain with Finkelsteins maneuver     left long finger:  Positive palpable nodule over the A1 pulley  Positive tenderness to palpation over A1 pulley  Positive clicking  Negative catching        ___________________________________________________  STUDIES REVIEWED:  No studies reviewed  PROCEDURES PERFORMED:  Procedures       _____________________________________________________  ASSESSMENT/PLAN:    Left thumb CMC Osteoarthritis  CSI was administered today without complications  Pt was advised that they may have increased pain for the next 24-36 hours from the CSI before feeling relief, during this time pt was advised to take NSAIDs and apply ice  After the initial 36 hours pt is advised to apply heat and continue motion of the thumb to decrease discomfort  Pt was advised that activity modification such as resting after increased activity or taking NSAIDs prior to increased activity may be necessary    Pt was advised that these CSI should not be administered more frequently than 3 months apart  Pt will follow up in 2 weeks    Left long finger Trigger finger  CSI was administered into the left  long fingerA1 pully without complications  Pt was advised to apply heat for any residual discomfort or clicking and locking  Pt was advised that only 2 CSI could be administered for this condition, and after we would have to consider trigger release if they are still having pain, clicking, locking  Pt will follow up in the office in 2 weeks      Follow Up:  Return in about 2 weeks (around 4/2/2019)  To Do Next Visit:  Re-evaluation of current issue    General Discussions:  [address] 16 Arthritis: The anatomy and physiology of carpometacarpal joint arthritis was discussed with the patient today in the office  Deterioration of the articular cartilage eventually leads to hypermobility at the thumb Aia 16 joint, resulting in joint subluxation, osteophyte formation, cystic changes within the trapezium and base of the first metacarpal, as well as subchondral sclerosis    Eventually, pain, limited mobility, and compensatory hyperextension at the metacarpophalangeal joint may develop  While normal activity and usage of the thumb joint may provide a painful experience to the patient, this typically does not result in damage to the thumb or hand  Treatment options include resting thumb spica splints to decreased joint edema, pain, and inflammation  Therapy exercises to strengthen the thenar musculature may relieve pain, but do not alter the overall continued development of osteoarthritis  Oral medications, topical medications, corticosteroid injections may decrease pain and increase overall function  Eventually, approximately 5% of patients may require surgical intervention  Trigger Finger: The anatomy and physiology of trigger finger was discussed with the patient today in the office  Edema and increased contact pressure within the flexor tendons at the A1 pulley can cause pain, crepitation, and triggering or locking of the digit resulting in limitation of function  Symptoms can occur at anytime but are typically worse in the morning or after a brief rest from repetitive activity  Treatment options include resting/nighttime MP blocking splints to decrease edema, oral anti-inflammatory medications, home or formal therapy exercises, up to 2 steroid injections within the tendon sheath, or surgical release  While majority of patients do respond to conservative treatment, up to 20% may require surgical release           Scribe Attestation    I,:   Bashir Vieyra am acting as a scribe while in the presence of the attending physician :        I,:   Mercedes Ambrose MD personally performed the services described in this documentation    as scribed in my presence :

## 2019-03-20 NOTE — TELEPHONE ENCOUNTER
PT  CALLED BACK AND WAS NOTIFIED  OF MAMMO AND WAS WONDERING ABOUT HER LAB RESULTS AND WOULD LIKE TO BE TESTED FOR HEP C

## 2019-03-20 NOTE — TELEPHONE ENCOUNTER
----- Message from Ok Forrester DO sent at 3/20/2019 12:30 PM EDT -----  Let patient know mammogram was normal

## 2019-03-20 NOTE — TELEPHONE ENCOUNTER
I didn't check any recent labs on her  She was tested for hepatitis C through [facility] one year ago and it was negative   No reason to check again unless she was exposed to someone who had or may have hepatitis C

## 2019-03-22 NOTE — TELEPHONE ENCOUNTER
T  CALLED BACK AND WAS NOTIFIED AND STATED WHEN THEY TESTED HER 1ST IT WAS POSITIVE THEN RETESTED AND WAS NEG   AND WAS TOLD SHE HAS 0 VIRAL LOAD, IS SHE A CARRIER

## 2019-04-03 PROBLEM — R13.10 DYSPHAGIA: Status: ACTIVE | Noted: 2019-01-01

## 2019-07-04 NOTE — TELEPHONE ENCOUNTER
RN unable to schedule appt for tomorrow  Pt instructed to call office in am for appt or be seen in ED if symptoms worsen  Health Call  Patient Name: Kellen Oliver  Gender: Female  : 1960  Age: 60 Y 3 M 24 D  Return Phone  Number: [phone] (Current)  Address: [facility]  City/State/Zip: [address]  Practice Name: [facility]  [address]:  Physician:  [facility] Name:  Relationship To  Patient: Self  Return Phone Number: [phone] (Current)  Presenting Problem: " I do not feel well I am coughing and  sneezing green mucus, I also get tired  and lose my breath easily "  Service Type: Triage  Charged Service 1: N/A  Pharmacy Name and  Number:  Nurse Assessment  Nurse: Venkata Douglass RN, Leighann Beltran Date/Time: 2019 3:05:28 PM  Type of assessment required:  ---General (Adult or Child)  Duration of Current S/S  ---4 days  Location/Radiation  ---Respiratory  Temperature (F) and route:  ---97 1 (oral)  Symptom Specific Meds (Dose/Time):  ---Mucinex LD 2000 Ventolin - using q 4 hrs  Other S/S  ---Wet productive cough, nasal congestion with yellow mucous  Denies current  wheezing, or SOB  Fatigued easily, attempting to pack boxes  Vomiting 2 days ago, has  now resolved  Bilateral ear congestion  Pain Scale on scale of 1-10, 10 being the worst:  ---3  Symptom progression:  ---worse  Intake and Output  ---Decreased appetite Normal output  Protocols  Protocol Title Nurse Date/Time  Cough - Acute Productive TimerJOJO Mosie Dixons 2019 3:10:10 PM  Question Caller Affirmed  Disp  Time Disposition Final User  2019 3:13:47 PM See Physician within 24 Hours JOJO Ferrer Mosie Dixons  2019 3:23:11 PM RN Triaged Yes TimerJOJO, [facility] Advice Given Per Protocol  SEE PHYSICIAN WITHIN 24 HOURS: * IF OFFICE WILL BE OPEN: You need to be seen within the next 24 hours  Call your  doctor when the office opens, and make an appointment   COUGH MEDICINES: * OTC COUGH SYRUPS: The most common cough  suppressant in OTC cough medications is dextromethorphan  Often the letters 'DM' appear in the name  OTC COUGH SYRUP -  DEXTROMETHORPHAN: * Cough syrups containing the cough suppressant dextromethorphan (DM) may help decrease your cough  Cough syrups work best for coughs that keep you awake at night  They can also sometimes help in the late stages of a respiratory  infection when the cough is dry and hacking  They can be used along with cough drops  * Examples: Benylin, Robitussin DM,  Vicks 44 Cough Relief * Read the package instructions for dosage, contraindications, and other important information  CAUTION -  DEXTROMETHORPHAN: * Do not try to completely suppress coughs that produce mucus and phlegm  Remember that coughing is  helpful in bringing up mucus from the lungs and preventing pneumonia  * Research Notes: Dextromethorphan in some research studies  has been shown to reduce the frequency and severity of cough in adults (18 years or older) without significant adverse effects  However,  other studies suggest that dextromethorphan is no better than placebo at reducing a cough  - Drug Abuse Potential: It should be noted  that dextromethorphan has become a drug of abuse  This problem is seen most often in adolescents  Overdose symptoms can range  from giggling and euphoria to hallucinations and coma  * CONTRAINDICATED: Do not take dextromethorphan if you are taking  a monoamine oxidase (MAO) inhibitor now or in the past 2 weeks  Examples of MAO inhibitors include isocarboxazid (Marplan),  phenelzine (Nardil), selegiline (Eldepryl, Emsam, Zelapar), and tranylcypromine (Parnate)  Do not take dextromethorphan if you are  taking venlafaxine (Effexor)  HUMIDIFIER: If the air is dry, use a humidifier in the bedroom  (Reason: dry air makes coughs worse)  PREVENT DEHYDRATION: * Drink adequate liquids  * This will help soothe an irritated or dry throat and loosen up the phlegm    AVOID TOBACCO SMOKE: Smoking or being exposed to smoke makes coughs much worse  CALL BACK IF: * You become worse  CARE ADVICE given per Cough - Acute Productive (Adult) guideline    Caller Understands: Yes  Caller Disagree/Comply: Comply  PreDisposition: Unsure

## 2019-07-05 NOTE — PROGRESS NOTES
Assessment/Plan:      Bronchitis- amoxicillin for a week, prednisone taper  Rest and drink plenty of fluids    No problem-specific Assessment & Plan notes found for this encounter  Diagnoses and all orders for this visit:    Bronchitis  -     predniSONE 10 mg tablet; 40mg daily for 2 days, 30mg daily for 2 days, 20mg daily for 2 days, 10mg daily for 2 days  -     amoxicillin-clavulanate (AUGMENTIN) 875-125 mg per tablet; Take 1 tablet by mouth every 12 (twelve) hours for 7 days          Subjective:      Patient ID: Rohini Cruz is a 60 y o  female  Patient comes in complaining of symptoms for 5 days  Started with a cough which was initially productive of whitish mucus but now it is very thick and dark yellow  Her nasal discharges the same  She felt she had a fever 2 days ago but did not take her temperature  She feels herself wheezing and is more short of breath  She is using her rescue inhaler many times a day instead of just once a day  She has slight ear pain and sore throat  The following portions of the patient's history were reviewed and updated as appropriate: allergies, current medications, past medical history, past social history and problem list     Review of Systems   Constitutional: Positive for fever  Negative for chills and fatigue  HENT: Positive for congestion, ear pain, postnasal drip, rhinorrhea and sore throat  Negative for sinus pressure and sinus pain  Respiratory: Positive for cough, shortness of breath and wheezing  Negative for chest tightness  Cardiovascular: Negative for chest pain and palpitations  Gastrointestinal: Negative for abdominal pain, diarrhea and nausea           Objective:      /70 (BP Location: Left arm, Patient Position: Sitting)   Pulse 84   Temp 98 9 °F (37 2 °C) (Tympanic)   Resp 12   Ht 5' 7" (1 702 m)   Wt 76 1 kg (167 lb 12 8 oz)   LMP  (LMP Unknown)   BMI 26 28 kg/m²          Physical Exam   Constitutional: She appears well-developed and well-nourished  HENT:   Head: Normocephalic and atraumatic  Right Ear: Tympanic membrane and ear canal normal    Left Ear: Tympanic membrane and ear canal normal    Mouth/Throat: Oropharynx is clear and moist  No oropharyngeal exudate  Cardiovascular: Normal rate, regular rhythm and normal heart sounds  Pulmonary/Chest: Effort normal  She has wheezes  She has rales in the left lower field  Abdominal: Soft  Bowel sounds are normal  There is no tenderness  Vitals reviewed

## 2019-08-12 NOTE — TELEPHONE ENCOUNTER
pt called and states that she has been getting more frequent migraines and a sharp pain behnd her r eye with the migraines  pain starts at the back of her head, forehead and her tempols, more right sided  occuring almost everday  started about 1 week ago  not currently having a migraine  no n/v   +light sensitivity      topamax 25mg 2 tabs hs  fioricet prn  please advise  [phone]

## 2019-08-12 NOTE — TELEPHONE ENCOUNTER
Called patient, having migraines and Fioricet taking longer to get relief she is on topiramate 50 mg at bedtime and is advised to increase to 75 mg  If she sees no relief in the next 2-3 days she is advised to call us

## 2019-08-20 NOTE — TELEPHONE ENCOUNTER
Called patient, has increase topiramate to 150 mg is advised to taper herself off and prescription for Depakote 250 mg twice a day sent to pharmacy  The side effects of the medication Depakote were also discussed at length with the patient she will call us if she has any side effects  Patient is advised to call us back in 1 week if she sees no relief of symptoms

## 2019-08-20 NOTE — TELEPHONE ENCOUNTER
Patient stated she still getting migraines about every other day despite increasing topiramate last week  She would like to discuss with Dr Stoddard Kidney    [phone]

## 2019-08-23 NOTE — PATIENT INSTRUCTIONS
Obesity   AMBULATORY CARE:   Obesity  is when your body mass index (BMI) is greater than 30  Your healthcare provider will use your height and weight to measure your BMI  The risks of obesity include  many health problems, such as injuries or physical disability  You may need tests to check for the following:  · Diabetes     · High blood pressure or high cholesterol     · Heart disease     · Gallbladder or liver disease     · Cancer of the colon, breast, prostate, liver, or kidney     · Sleep apnea     · Arthritis or gout  Seek care immediately if:   · You have a severe headache, confusion, or difficulty speaking  · You have weakness on one side of your body  · You have chest pain, sweating, or shortness of breath  Contact your healthcare provider if:   · You have symptoms of gallbladder or liver disease, such as pain in your upper abdomen  · You have knee or hip pain and discomfort while walking  · You have symptoms of diabetes, such as intense hunger and thirst, and frequent urination  · You have symptoms of sleep apnea, such as snoring or daytime sleepiness  · You have questions or concerns about your condition or care  Treatment for obesity  focuses on helping you lose weight to improve your health  Even a small decrease in BMI can reduce the risk for many health problems  Your healthcare provider will help you set a weight-loss goal   · Lifestyle changes  are the first step in treating obesity  These include making healthy food choices and getting regular physical activity  Your healthcare provider may suggest a weight-loss program that involves coaching, education, and therapy  · Medicine  may help you lose weight when it is used with a healthy diet and physical activity  · Surgery  can help you lose weight if you are very obese and have other health problems  There are several types of weight-loss surgery  Ask your healthcare provider for more information    Be successful losing weight:   · Set small, realistic goals  An example of a small goal is to walk for 20 minutes 5 days a week  Anther goal is to lose 5% of your body weight  · Tell friends, family members, and coworkers about your goals  and ask for their support  Ask a friend to lose weight with you, or join a weight-loss support group  · Identify foods or triggers that may cause you to overeat , and find ways to avoid them  Remove tempting high-calorie foods from your home and workplace  Place a bowl of fresh fruit on your kitchen counter  If stress causes you to eat, then find other ways to cope with stress  · Keep a diary to track what you eat and drink  Also write down how many minutes of physical activity you do each day  Weigh yourself once a week and record it in your diary  Eating changes: You will need to eat 500 to 1,000 fewer calories each day than you currently eat to lose 1 to 2 pounds a week  The following changes will help you cut calories:  · Eat smaller portions  Use small plates, no larger than 9 inches in diameter  Fill your plate half full of fruits and vegetables  Measure your food using measuring cups until you know what a serving size looks like  · Eat 3 meals and 1 or 2 snacks each day  Plan your meals in advance  Mynor Brantley and eat at home most of the time  Eat slowly  · Eat fruits and vegetables at every meal   They are low in calories and high in fiber, which makes you feel full  Do not add butter, margarine, or cream sauce to vegetables  Use herbs to season steamed vegetables  · Eat less fat and fewer fried foods  Eat more baked or grilled chicken and fish  These protein sources are lower in calories and fat than red meat  Limit fast food  Dress your salads with olive oil and vinegar instead of bottled dressing  · Limit the amount of sugar you eat  Do not drink sugary beverages  Limit alcohol  Activity changes:  Physical activity is good for your body in many ways   It helps you burn calories and build strong muscles  It decreases stress and depression, and improves your mood  It can also help you sleep better  Talk to your healthcare provider before you begin an exercise program   · Exercise for at least 30 minutes 5 days a week  Start slowly  Set aside time each day for physical activity that you enjoy and that is convenient for you  It is best to do both weight training and an activity that increases your heart rate, such as walking, bicycling, or swimming  · Find ways to be more active  Do yard work and housecleaning  Walk up the stairs instead of using elevators  Spend your leisure time going to events that require walking, such as outdoor festivals or fairs  This extra physical activity can help you lose weight and keep it off  Follow up with your healthcare provider as directed: You may need to meet with a dietitian  Write down your questions so you remember to ask them during your visits  © 2017 [employer] Information is for End User's use only and may not be sold, redistributed or otherwise used for commercial purposes  All illustrations and images included in CareNotes® are the copyrighted property of [employer]  or Richard Cano  The above information is an  only  It is not intended as medical advice for individual conditions or treatments  Talk to your doctor, nurse or pharmacist before following any medical regimen to see if it is safe and effective for you  Urinary Incontinence   WHAT YOU NEED TO KNOW:   What is urinary incontinence? Urinary incontinence (UI) is when you lose control of your bladder  What causes UI? UI occurs because your bladder cannot store or empty urine properly  The following are the most common types of UI:  · Stress incontinence  is when you leak urine due to increased bladder pressure  This may happen when you cough, sneeze, or exercise       · Urge incontinence  is when you feel the need to urinate right away and leak urine accidentally  · Mixed incontinence  is when you have both stress and urge UI  What are the signs and symptoms of UI?   · You feel like your bladder does not empty completely when you urinate  · You urinate often and need to urinate immediately  · You leak urine when you sleep, or you wake up with the urge to urinate  · You leak urine when you cough, sneeze, exercise, or laugh  How is UI diagnosed? Your healthcare provider will ask how often you leak urine and whether you have stress or urge symptoms  Tell him which medicines you take, how often you urinate, and how much liquid you drink each day  You may need any of the following tests:  · Urine tests  may show infection or kidney function  · A pelvic exam  may be done to check for blockages  A pelvic exam will also show if your bladder, uterus, or other organs have moved out of place  · An x-ray, ultrasound, or CT  may show problems with parts of your urinary system  You may be given contrast liquid to help your organs show up better in the pictures  Tell the healthcare provider if you have ever had an allergic reaction to contrast liquid  Do not enter the MRI room with anything metal  Metal can cause serious injury  Tell the healthcare provider if you have any metal in or on your body  · A bladder scan  will show how much urine is left in your bladder after you urinate  You will be asked to urinate and then healthcare providers will use a small ultrasound machine to check the urine left in your bladder  · Cystometry  is used to check the function of your urinary system  Your healthcare provider checks the pressure in your bladder while filling it with fluid  Your bladder pressure may also be tested when your bladder is full and while you urinate  How is UI treated? · Medicines  can help strengthen your bladder control      · Electrical stimulation  is used to send a small amount of electrical energy to your pelvic floor muscles  This helps control your bladder function  Electrodes may be placed outside your body or in your rectum  For women, the electrodes may be placed in the vagina  · A bulking agent  may be injected into the wall of your urethra to make it thicker  This helps keep your urethra closed and decreases urine leakage  · Devices  such as a clamp, pessary, or tampon may help stop urine leaks  Ask your healthcare provider for more information about these and other devices  · Surgery  may be needed if other treatments do not work  Several types of surgery can help improve your bladder control  Ask your healthcare provider for more information about the surgery you may need  How can I manage my symptoms? · Do pelvic muscle exercises often  Your pelvic muscles help you stop urinating  Squeeze these muscles tight for 5 seconds, then relax for 5 seconds  Gradually work up to squeezing for 10 seconds  Do 3 sets of 15 repetitions a day, or as directed  This will help strengthen your pelvic muscles and improve bladder control  · A catheter  may be used to help empty your bladder  A catheter is a tiny, plastic tube that is put into your bladder to drain your urine  Your healthcare provider may tell you to use a catheter to prevent your bladder from getting too full and leaking urine  · Keep a UI record  Write down how often you leak urine and how much you leak  Make a note of what you were doing when you leaked urine  · Train your bladder  Go to the bathroom at set times, such as every 2 hours, even if you do not feel the urge to go  You can also try to hold your urine when you feel the urge to go  For example, hold your urine for 5 minutes when you feel the urge to go  As that becomes easier, hold your urine for 10 minutes  · Drink liquids as directed  Ask your healthcare provider how much liquid to drink each day and which liquids are best for you   You may need to limit the amount of liquid you drink to help control your urine leakage  Limit or do not have drinks that contain caffeine or alcohol  Do not drink any liquid right before you go to bed  · Prevent constipation  Eat a variety of high-fiber foods  Good examples are high-fiber cereals, beans, vegetables, and whole-grain breads  Prune juice may help make your bowel movement softer  Walking is the best way to trigger your intestines to have a bowel movement  · Exercise regularly and maintain a healthy weight  Ask your healthcare provider how much you should weigh and about the best exercise plan for you  Weight loss and exercise will decrease pressure on your bladder and help you control your leakage  Ask him to help you create a weight loss plan if you are overweight  When should I seek immediate care? · You have severe pain  · You are confused or cannot think clearly  When should I contact my healthcare provider? · You have a fever  · You see blood in your urine  · You have pain when you urinate  · You have new or worse pain, even after treatment  · Your mouth feels dry or you have vision changes  · Your urine is cloudy or smells bad  · You have questions or concerns about your condition or care  CARE AGREEMENT:   You have the right to help plan your care  Learn about your health condition and how it may be treated  Discuss treatment options with your caregivers to decide what care you want to receive  You always have the right to refuse treatment  The above information is an  only  It is not intended as medical advice for individual conditions or treatments  Talk to your doctor, nurse or pharmacist before following any medical regimen to see if it is safe and effective for you  © 2017 [employer] Information is for End User's use only and may not be sold, redistributed or otherwise used for commercial purposes   All illustrations and images included in CareNotes® are the copyrighted property of [employer]  or Richard Cano  Cigarette Smoking and Your Health   AMBULATORY CARE:   Risks to your health if you smoke:  Nicotine and other chemicals found in tobacco damage every cell in your body  Even if you are a light smoker, you have an increased risk for cancer, heart disease, and lung disease  If you are pregnant or have diabetes, smoking increases your risk for complications  Benefits to your health if you stop smoking:   · You decrease respiratory symptoms such as coughing, wheezing, and shortness of breath  · You reduce your risk for cancers of the lung, mouth, throat, kidney, bladder, pancreas, stomach, and cervix  If you already have cancer, you increase the benefits of chemotherapy  You also reduce your risk for cancer returning or a second cancer from developing  · You reduce your risk for heart disease, blood clots, heart attack, and stroke  · You reduce your risk for lung infections, and diseases such as pneumonia, asthma, chronic bronchitis, and emphysema  · Your circulation improves  More oxygen can be delivered to your body  If you have diabetes, you lower your risk for complications, such as kidney, artery, and eye diseases  You also lower your risk for nerve damage  Nerve damage can lead to amputations, poor vision, and blindness  · You improve your body's ability to heal and to fight infections  Benefits to the health of others if you stop smoking:  Tobacco is harmful to nonsmokers who breathe in your secondhand smoke  The following are ways the health of others around you may improve when you stop smoking:  · You lower the risks for lung cancer and heart disease in nonsmoking adults  · If you are pregnant, you lower the risk for miscarriage, early delivery, low birth weight, and stillbirth  You also lower your baby's risk for SIDS, obesity, developmental delay, and neurobehavioral problems, such as ADHD  · If you have children, you lower their risk for ear infections, colds, pneumonia, bronchitis, and asthma  For more information and support to stop smoking:   · Smokefree  gov  Phone: 4- 542 - 112-9533  Web Address: www smokefree  gov  Follow up with your healthcare provider as directed:  Write down your questions so you remember to ask them during your visits  © 2017 [employer] Information is for End User's use only and may not be sold, redistributed or otherwise used for commercial purposes  All illustrations and images included in CareNotes® are the copyrighted property of [employer]  or Richard Cano  The above information is an  only  It is not intended as medical advice for individual conditions or treatments  Talk to your doctor, nurse or pharmacist before following any medical regimen to see if it is safe and effective for you  Fall Prevention   AMBULATORY CARE:   Fall prevention  includes ways to make your home and other areas safer  It also includes ways you can move more carefully to prevent a fall  Health conditions that cause changes in your blood pressure, vision, or muscle strength and coordination may increase your risk for falls  Medicines may also increase your risk for falls if they make you dizzy, weak, or sleepy  Call 911 or have someone else call if:   · You have fallen and are unconscious  · You have fallen and cannot move part of your body  Contact your healthcare provider if:   · You have fallen and have pain or a headache  · You have questions or concerns about your condition or care  Fall prevention tips:   · Stand or sit up slowly  This may help you keep your balance and prevent falls  · Use assistive devices as directed  Your healthcare provider may suggest that you use a cane or walker to help you keep your balance  You may need to have grab bars put in your bathroom near the toilet or in the shower      · Wear shoes that fit well and have soles that   Wear shoes both inside and outside  Use slippers with good   Do not wear shoes with high heels  · Wear a personal alarm  This is a device that allows you to call 911 if you fall and need help  Ask your healthcare provider for more information  · Stay active  Exercise can help strengthen your muscles and improve your balance  Your healthcare provider may recommend water aerobics or walking  He or she may also recommend physical therapy to improve your coordination  Never start an exercise program without talking to your healthcare provider first      · Manage your medical conditions  Keep all appointments with your healthcare providers  Visit your eye doctor as directed  Home safety tips:   · Add items to prevent falls in the bathroom  Put nonslip strips on your bath or shower floor to prevent you from slipping  Use a bath mat if you do not have carpet in the bathroom  This will prevent you from falling when you step out of the bath or shower  Use a shower seat so you do not need to stand while you shower  Sit on the toilet or a chair in your bathroom to dry yourself and put on clothing  This will prevent you from losing your balance from drying or dressing yourself while you are standing  · Keep paths clear  Remove books, shoes, and other objects from walkways and stairs  Place cords for telephones and lamps out of the way so that you do not need to walk over them  Tape them down if you cannot move them  Remove small rugs  If you cannot remove a rug, secure it with double-sided tape  This will prevent you from tripping  · Install bright lights in your home  Use night lights to help light paths to the bathroom or kitchen  Always turn on the light before you start walking  · Keep items you use often on shelves within reach  Do not use a step stool to help you reach an item  · Paint or place reflective tape on the edges of your stairs    This will help you see the stairs better  Follow up with your healthcare provider as directed:  Write down your questions so you remember to ask them during your visits  © 2017 [employer] [employer] Information is for End User's use only and may not be sold, redistributed or otherwise used for commercial purposes  All illustrations and images included in CareNotes® are the copyrighted property of [employer]  or Richard Cano  The above information is an  only  It is not intended as medical advice for individual conditions or treatments  Talk to your doctor, nurse or pharmacist before following any medical regimen to see if it is safe and effective for you  Advance Directives   WHAT YOU NEED TO KNOW:   What are advance directives? Advance directives are legal documents that state your wishes and plans for medical care  These plans are made ahead of time in case you lose your ability to make decisions for yourself  Advance directives can apply to any medical decision, such as the treatments you want, and if you want to donate organs  What are the types of advance directives? There are many types of advance directives, and each state has rules about how to use them  You may choose a combination of any of the following:  · Living will: This is a written record of the treatment you want  You can also choose which treatments you do not want, which to limit, and which to stop at a certain time  This includes surgery, medicine, IV fluid, and tube feedings  · Durable power of  for healthcare [place] [facility]): This is a written record that states who you want to make healthcare choices for you when you are unable to make them for yourself  This person, called a proxy, is usually a family member or a friend  You may choose more than 1 proxy  · Do not resuscitate (DNR) order:  A DNR order is used in case your heart stops beating or you stop breathing   It is a request not to have certain forms of treatment, such as CPR  A DNR order may be included in other types of advance directives  · Medical directive: This covers the care that you want if you are in a coma, near death, or unable to make decisions for yourself  You can list the treatments you want for each condition  Treatment may include pain medicine, surgery, blood transfusions, dialysis, IV or tube feedings, and a ventilator (breathing machine)  · Values history: This document has questions about your views, beliefs, and how you feel and think about life  This information can help others choose the care that you would choose  Why are advance directives important? An advance directive helps you control your care  Although spoken wishes may be used, it is better to have your wishes written down  Spoken wishes can be misunderstood, or not followed  Treatments may be given even if you do not want them  An advance directive may make it easier for your family to make difficult choices about your care  How do I decide what to put in my advance directives? · Make informed decisions:  Make sure you fully understand treatments or care you may receive  Think about the benefits and problems your decisions could cause for you or your family  Talk to healthcare providers if you have concerns or questions before you write down your wishes  You may also want to talk with your Religion or , or a   Check your state laws to make sure that what you put in your advance directive is legal      · Sign all forms:  Sign and date your advance directive when you have finished  You may also need 2 witnesses to sign the forms  Witnesses cannot be your doctor or his staff, your spouse, heirs or beneficiaries, people you owe money to, or your chosen proxy  Talk to your family, proxy, and healthcare providers about your advance directive  Give each person a copy, and keep one for yourself in a place you can get to easily   Do not keep it hidden or locked away  · Review and revise your plans: You can revise your advance directive at any time, as long as you are able to make decisions  Review your plan every year, and when there are changes in your life, or your health  When you make changes, let your family, proxy, and healthcare providers know  Give each a new copy  Where can I find more information? · [employer]  [address] , [place] [address]  Phone: 5- [phone] - [phone]  Phone: 7- [phone] - [phone]  Web Address: http://www  aafp org  · [address] [facility])  [address] [address] [facility], [address] [facility] , [address] [place] [address]  Phone: [phone]  Phone: [phone]  Web Address: Erika borrero  CARE AGREEMENT:   You have the right to help plan your care  To help with this plan, you must learn about your health condition and treatment options  You must also learn about advance directives and how they are used  Work with your healthcare providers to decide what care will be used to treat you  You always have the right to refuse treatment  The above information is an  only  It is not intended as medical advice for individual conditions or treatments  Talk to your doctor, nurse or pharmacist before following any medical regimen to see if it is safe and effective for you  © 2017 [address] [employer] Information is for End User's use only and may not be sold, redistributed or otherwise used for commercial purposes  All illustrations and images included in CareNotes® are the copyrighted property of [employer]  or Richard Cano

## 2019-08-23 NOTE — PROGRESS NOTES
INTERNAL MEDICINE FOLLOW-UP OFFICE VISIT  [facility] - [facility] [address] [place] [address]    NAME: Baldemar Dunlap  AGE: 60 y o  SEX: female  : 1960     DATE: 2019     Assessment and Plan:     1  Rheumatoid arthritis involving multiple sites with positive rheumatoid factor ([facility] )  2  Essential hypertension  3  Centrilobular emphysema ([facility] )  4  Hypercholesteremia    Chronic medical conditions are stable  Recent labs were reviewed  Should follow-up with psychiatry, rheumatology, and pulmonary  She was highly advised to quit smoking  Her cholesterol has been controlled on statin  She needs to work on increasing physical activity  Continue current anti-hypertensives  Return in about 5 months (around 2020) for Follow-up  Chief Complaint:     Chief Complaint   Patient presents with    Follow-up     4 month      History of Present Illness:     Patient presents for follow-up  She was having increased headaches but medication adjustments made by neurology are helping  She has stopped the Humira and gets irritated that all the rheumatologists she ends up seeing relocate  She just had to see her 6th rheumatologist and that doctor admitted she was only there temporarily  Recent ESR/CRP were normal  Renal and liver function are stable  She continues to smoke  Follows with pulmonary  Due for lung cancer screening in October  Her mood has been stable  She has been considering moving, but not sure she wants to go through all that  Her son lives in [place] but she thinks it is too hot there for her  She is bored of the area and wants to start of fresh  The following portions of the patient's history were reviewed and updated as appropriate: allergies, current medications, past family history, past medical history, past social history, past surgical history and problem list      Review of Systems:     Review of Systems   Constitutional: Positive for fatigue   Negative for activity change and appetite change  Respiratory: Positive for cough  Negative for apnea, chest tightness, shortness of breath and wheezing  Cardiovascular: Negative for chest pain, palpitations and leg swelling  Gastrointestinal: Negative for abdominal distention, abdominal pain, blood in stool, constipation, diarrhea, nausea and vomiting  Musculoskeletal: Positive for arthralgias  Negative for back pain, gait problem, joint swelling and myalgias  Neurological: Positive for headaches  Negative for dizziness, weakness, light-headedness and numbness  Psychiatric/Behavioral: Negative for behavioral problems, confusion, hallucinations, sleep disturbance and suicidal ideas  The patient is not nervous/anxious  Problem List:     Patient Active Problem List   Diagnosis    Anxiety    Cardiomyopathy ([facility] )    Atherosclerotic heart disease of native coronary artery without angina pectoris    COPD (chronic obstructive pulmonary disease) ([facility] )    Essential hypertension    GERD (gastroesophageal reflux disease)    Hypercholesteremia    Nicotine dependence    Rheumatoid arthritis with positive rheumatoid factor (HCC)    Chronic migraine without aura without status migrainosus, not intractable    Osteoporosis of forearm    Multiple lung nodules on CT    Dysphagia      Objective:     /68 (BP Location: Left arm, Patient Position: Sitting, Cuff Size: Standard)   Pulse 92   Temp 98 4 °F (36 9 °C) (Tympanic) Comment: w/ aspirin  Ht 5' 6" (1 676 m)   Wt 79 kg (174 lb 3 2 oz)   LMP  (LMP Unknown)   SpO2 97%   BMI 28 12 kg/m²     Physical Exam   Constitutional: She is oriented to person, place, and time  She appears well-developed and well-nourished  No distress  Eyes: Conjunctivae are normal  Right eye exhibits no discharge  Left eye exhibits no discharge  No scleral icterus  Neck: Neck supple  No JVD present  No thyromegaly present  Cardiovascular: Normal rate, regular rhythm and normal heart sounds  No murmur heard  Pulmonary/Chest: Effort normal  No respiratory distress  She has decreased breath sounds  She has no wheezes  She has no rales  She exhibits no tenderness  Abdominal: Soft  Bowel sounds are normal  She exhibits no distension  There is no tenderness  Musculoskeletal: She exhibits no edema  Lymphadenopathy:     She has no cervical adenopathy  Neurological: She is alert and oriented to person, place, and time  Skin: Skin is warm and dry  She is not diaphoretic  Psychiatric: She has a normal mood and affect  Her behavior is normal    Vitals reviewed       Luis Ridley DO  [facility]

## 2019-08-23 NOTE — PROGRESS NOTES
Assessment and Plan:     1  Medicare annual wellness visit, subsequent    Tobacco Cessation Counseling: Tobacco cessation counseling and education was provided  The patient is sincerely urged to quit consumption of tobacco  She is not ready to quit tobacco  The numerous health risks of tobacco consumption were discussed  BMI Counseling: Body mass index is 28 12 kg/m²  Discussed the patient's BMI with her  The BMI is above average  BMI counseling and education was provided to the patient  Nutrition recommendations include reducing portion sizes, decreasing overall calorie intake, 3-5 servings of fruits/vegetables daily, moderation in carbohydrate intake and increasing intake of lean protein       History of Present Illness:     Patient presents for Medicare Annual Wellness visit    Patient Care Team:  Demond Lentz DO as PCP - General (Internal Medicine)  MD Debbie León MD Gust Romano, MD (Cardiology)  Lakshmi Marley PA-C (Physician Assistant)  Ori Mccarthy MD (Pulmonary Disease)  Swapna Ravi MD (Psychiatry)  Billie Choi MD (Pain Medicine)  Emily Huang RN as Lead OP Care Mgr (Care Coordination)     Problem List:     Patient Active Problem List   Diagnosis    Anxiety    Cardiomyopathy [facility])    Atherosclerotic heart disease of native coronary artery without angina pectoris    [facility]) [facility]    Essential hypertension    GERD (gastroesophageal reflux disease)    Hypercholesteremia    Nicotine dependence    Rheumatoid arthritis with positive rheumatoid factor ([facility] )    Chronic migraine without aura without status migrainosus, not intractable    Osteoporosis of forearm    Multiple lung nodules on CT    Dysphagia      Past Medical and Surgical History:     Past Medical History:   Diagnosis Date    Anxiety     Cancer of skin of abdomen     Cardiomyopathy ([facility] )     Carpal tunnel syndrome     Cervical herniated disc     Chronic migraine without aura without status migrainosus, not intractable     Chronic sinusitis     Compression fracture of lumbar spine, non-traumatic (HCC)     COPD (chronic obstructive pulmonary disease) (HCC)     Coronary artery disease     cardiomyopathy    Depression     Fibroids     GERD (gastroesophageal reflux disease)     Hepatitis C antibody test positive     No detectable HCV RNA    Hypercholesteremia     Hypertension     Liver hemangioma     Lumbar disc herniation     Nasal turbinate hypertrophy     Rheumatoid arthritis (HCC)     RLS (restless legs syndrome)     Tension headache      Past Surgical History:   Procedure Laterality Date    CARPAL TUNNEL RELEASE Bilateral     CHOLECYSTECTOMY      1/22/2016    COLONOSCOPY  11/22/2011    Dr Nuvia Barriga Left 05/28/2014    FOOT SURGERY Right 09/17/2013    2nd toe    FOOT SURGERY Bilateral 11/08/2017    HAND SURGERY  05/23/2016    Right index finger and knuckle repaired    KNEE SURGERY Right 07/10/2012    LARYNGOSCOPY  05/15/2012    direct laryngoscopy with stripping of vocal cords    LARYNGOSCOPY  04/17/2012    direct laryngoscopy with stripping of vocal cords    MOHS SURGERY  02/27/2015    Trunk/Abdomen    NASAL SEPTUM SURGERY  03/05/2013    NEUROPLASTY / TRANSPOSITION MEDIAN NERVE AT CARPAL TUNNEL Right 03/09/2015    GA ESOPHAGOGASTRODUODENOSCOPY TRANSORAL DIAGNOSTIC N/A 5/9/2019    Procedure: ESOPHAGOGASTRODUODENOSCOPY (EGD); Surgeon: Izabella Calabrese DO;  Location: [facility];   Service: Gastroenterology    ROTATOR CUFF REPAIR Left 10/23/2012    SHOULDER ARTHROPLASTY      total shoulder replacement-right-9/22/2016    SINUS SURGERY  05/16/2017    Dr [facility]      Family History:     Family History   Problem Relation Age of Onset    Heart disease Mother         cardiac pacemaker    Lymphoma Father     Cancer Sister     Stroke Maternal Grandfather     Cancer Family     Other Family         bone issues  Breast cancer Sister       Social History:     Social History     Tobacco Use   Smoking Status Current Every Day Smoker    Packs/day: 0 25    Years: 26 00    Pack years: 6 50    Types: Cigarettes    Start date: 1992   Smokeless Tobacco Never Used     Social History     Substance and Sexual Activity   Alcohol Use Yes    Frequency: Monthly or less    Drinks per session: 3 or 4    Binge frequency: Never    Comment: social     Social History     Substance and Sexual Activity   Drug Use Yes    Frequency: 5 0 times per week    Types: Marijuana    Comment: smoking-daily      Medications and Allergies:     Current Outpatient Medications   Medication Sig Dispense Refill    albuterol (VENTOLIN HFA) 90 mcg/act inhaler Inhale 2 puffs as needed       alendronate (FOSAMAX) 70 mg tablet Take 70 mg by mouth Once a week      aspirin 81 MG tablet Take 1 tablet by mouth daily      atorvastatin (LIPITOR) 40 mg tablet Take 1 tablet by mouth daily       bethanechol (URECHOLINE) 25 mg tablet Take 1 tablet (25 mg total) by mouth 4 (four) times a day (Patient taking differently: Take 25 mg by mouth 3 (three) times a day ) 120 tablet 3    BREO ELLIPTA 200-25 MCG/INH inhaler Inhale 2 puffs daily       buPROPion (WELLBUTRIN XL) 150 mg 24 hr tablet Take 1 tablet (150 mg total) by mouth daily 90 tablet 1    butalbital-acetaminophen-caffeine (FIORICET,ESGIC) -40 mg per tablet Take 1 tablet by mouth 2 (two) times a day as needed for migraine 40 tablet 1    Calcium Carb-Cholecalciferol (CALCIUM 600+D3) 600-200 MG-UNIT TABS Take 2 tablets by mouth daily      clonazePAM (KlonoPIN) 0 5 mg tablet Take 1 tablet by mouth as needed        divalproex sodium (DEPAKOTE) 250 mg EC tablet Take 1 tablet (250 mg total) by mouth every 12 (twelve) hours 60 tablet 1    DULoxetine (CYMBALTA) 30 mg delayed release capsule Take 30 mg by mouth every morning       DULoxetine (CYMBALTA) 60 mg delayed release capsule Take 60 mg by mouth every morning       ergocalciferol (VITAMIN D2) 50,000 units Take 1 capsule (50,000 Units total) by mouth once a week 8 capsule 3    eszopiclone (LUNESTA) 2 mg tablet TAKE 1 TABLET (2 MG) BY MOUTH DAILY IMMEDIATELY BEFORE BEDTIME  0    Lactobacillus (PROBIOTIC ACIDOPHILUS PO) Take 1 capsule by mouth daily      lidocaine (XYLOCAINE) 5 % ointment apply locally DAILY prn  2    lisinopril (ZESTRIL) 40 mg tablet Take 1 tablet by mouth daily      loratadine (CLARITIN) 10 mg tablet Take 10 mg by mouth daily      meclizine (ANTIVERT) 25 mg tablet TAKE 1 TABLET (25 MG TOTAL) BY MOUTH EVERY 12 (TWELVE) HOURS AS NEEDED FOR DIZZINESS (Patient taking differently: Take 25 mg by mouth 2 (two) times a day ) 60 tablet 3    mometasone (NASONEX) 50 mcg/act nasal spray 2 sprays into each nostril as needed       primidone (MYSOLINE) 50 mg tablet TAKE 1 TABLET BY MOUTH TWICE A  tablet 1    Adalimumab (HUMIRA PEN) 40 MG/0 8ML PNKT Inject under the skin      orphenadrine (NORFLEX) 100 mg tablet TAKE 1 TABLET (100 MG TOTAL) BY MOUTH DAILY AT BEDTIME FOR 30 DAYS (Patient not taking: Reported on 7/5/2019) 30 tablet 1    orphenadrine (NORFLEX) 100 mg tablet Take 1 tablet by mouth daily at bedtime      pantoprazole (PROTONIX) 40 mg tablet Take 1 tablet by mouth 2 (two) times a day        predniSONE 10 mg tablet 40mg daily for 2 days, 30mg daily for 2 days, 20mg daily for 2 days, 10mg daily for 2 days (Patient not taking: Reported on 8/23/2019) 20 tablet 0     No current facility-administered medications for this visit        Allergies   Allergen Reactions    Clindamycin Rash    Pollen Extract Itching and Sneezing      Immunizations:     Immunization History   Administered Date(s) Administered    INFLUENZA 12/16/2014, 02/22/2017    Influenza, recombinant, quadrivalent,injectable, preservative free 09/05/2018    Pneumococcal Conjugate 13-Valent 11/30/2018      Medicare Screening Tests and Risk Assessments:     Neha Jasmine is here for her Subsequent Wellness visit  Health Risk Assessment:  Patient rates overall health as fair  Patient feels that their physical health rating is Same  Eyesight was rated as Same  Hearing was rated as Same  Patient feels that their emotional and mental health rating is Slightly worse  Pain experienced by patient in the last 7 days has been Some  Patient's pain rating has been 4/10  Patient states that she has experienced no weight loss or gain in last 6 months  Emotional/Mental Health:  Patient has been feeling nervous/anxious  PHQ-9 Depression Screening:    Frequency of the following problems over the past two weeks:      1  Little interest or pleasure in doing things: 0 - not at all      2  Feeling down, depressed, or hopeless: 0 - not at all  PHQ-2 Score: 0          Broken Bones/Falls: Fall Risk Assessment:    In the past year, patient has experienced: History of falling in past year    Number of falls: 1    Injured during fall: No      Patient feels steady when standing or walking  Patient is not worried about falling  Bladder/Bowel:  Patient has not leaked urine accidently in the last six months  Patient reports no loss of bowel control  Immunizations:  Patient has had a flu vaccination within the last year  Patient has received a pneumonia shot  Patient has not received a shingles shot  Patient has not received tetanus/diphtheria shot  Home Safety:  Patient has trouble with stairs inside or outside of their home  Patient currently reports that there are no safety hazards present in home, working smoke alarms, no working carbon monoxide detectors  Preventative Screenings:   Breast cancer screening performed, colon cancer screen completed, cholesterol screen completed, no glaucoma eye exam completed    Nutrition:  Current diet: Regular with servings of the following:    Medications:  Patient is currently taking over-the-counter supplements       List of OTC medications includes: advil  Patient is able to manage medications  Lifestyle Choices:  Patient reports current tobacco use  Patient reports alcohol use  Alcohol use per week: Monthly or less  Patient drives a vehicle  Patient wears seat belt  Current level of exercise of physical activity described by patient as: No formal exercise  Activities of Daily Living:  Can get out of bed by his or her self, able to dress self, able to make own meals, able to do own shopping, able to bathe self, can do own laundry/housekeeping, can manage own money, pay bills and track expenses    Previous Hospitalizations:  Hospitalization or ED visit in past 12 months  Number of hospitalizations within the last year: 1-2        Advanced Directives:  Patient has not decided on power of   Patient has not completed advanced directive          Preventative Screening/Counseling:      Cardiovascular:      General: Risks and Benefits Discussed and Screening Current      Counseling: Healthy Diet and Healthy Weight          Diabetes:      General: Risks and Benefits Discussed and Screening Current      Counseling: Healthy Diet and Healthy Weight          Colorectal Cancer:      General: Screening Current and Risks and Benefits Discussed          Breast Cancer:      General: Risks and Benefits Discussed and Screening Current          Cervical Cancer:      General: Risks and Benefits Discussed and Screening Current          Osteoporosis:      General: Risks and Benefits Discussed and Screening Current      Counseling: Calcium and Vitamin D Intake and Regular Weightbearing Exercise          AAA:      General: Screening Not Indicated          Glaucoma:      General: Screening Not Indicated          HIV:      General: Patient Declines          Hepatitis C:      General: Risks and Benefits Discussed and Screening Current        Advanced Directives:   Patient has no living will for healthcare, does not have durable POA for healthcare, patient does not have an advanced directive  5 wishes given  Immunizations:      Influenza: Risks & Benefits Discussed      Pneumococcal: Risks & Benefits Discussed      Shingrix: Risks & Benefits Discussed      Other Preventative Counseling (Non-Medicare):   Fall Prevention          Abhay White, DO

## 2019-08-29 NOTE — TELEPHONE ENCOUNTER
Called patient who has been complaining of headaches for the last 1 or 2 weeks and reports he has pain as behind the right eye especially when she lay supine at times waking her up from sleep and generally has to sit up for a period of time to get relief she describes the pain as intense and there is been no relief with Depakote prior to which she was on other medications including topiramate  Patient clearly describes that the nature of this headache is different from her typical migraines  Since the patient has been refractory to treatment and the headache quality is different will get an MRI of the brain and orbits with and without contrast at this time  Her last MRI was 2 years ago which was unremarkable  Patient also advised to increase the dose of Depakote to 500 twice a day  She denies any side effects from the medications  She will call us in for 3-4 days if she sees no relief

## 2019-08-29 NOTE — TELEPHONE ENCOUNTER
Pt called w/ an update  States that depakote is not helping  Still getting daily migraines  + sensitive to light and noise  Requesting alt meds be sent to [facility] pharmacy on file

## 2019-09-06 NOTE — TELEPHONE ENCOUNTER
divalproex sodium (DEPAKOTE) 250 mg EC tablet   Sig: Take 1 tablet (250 mg total) by mouth every 12 (twelve) hours            Received a message from the patient through our Med Refill line, she is asking for a refill on her Divalproex and is stating you have changed the dosage to 2 tablets twice a day and the pharmacy is stating it is too soon to fill  Please provide a new RX with the dosage changes to her local retail pharmacy [facility] if dosage has been changed  Thank you

## 2019-10-02 NOTE — TELEPHONE ENCOUNTER
Pt called for MRI results, questioning if you feel she needs to come back in for her November appt  Please advise

## 2019-10-20 PROBLEM — J96.02 ACUTE RESPIRATORY FAILURE WITH HYPOXIA AND HYPERCAPNIA (HCC): Status: ACTIVE | Noted: 2019-01-01

## 2019-10-20 PROBLEM — E87.2 LACTIC ACIDOSIS: Status: ACTIVE | Noted: 2019-01-01

## 2019-10-20 PROBLEM — T68.XXXA HYPOTHERMIA: Status: ACTIVE | Noted: 2019-01-01

## 2019-10-20 PROBLEM — R65.20 SEVERE SEPSIS (HCC): Status: ACTIVE | Noted: 2019-01-01

## 2019-10-20 PROBLEM — G93.40 ACUTE ENCEPHALOPATHY: Status: ACTIVE | Noted: 2019-01-01

## 2019-10-20 PROBLEM — R57.9 SHOCK (HCC): Status: ACTIVE | Noted: 2019-01-01

## 2019-10-20 PROBLEM — J96.01 ACUTE RESPIRATORY FAILURE WITH HYPOXIA AND HYPERCAPNIA (HCC): Status: ACTIVE | Noted: 2019-01-01

## 2019-10-20 PROBLEM — N17.9 ACUTE KIDNEY INJURY (HCC): Status: ACTIVE | Noted: 2019-01-01

## 2019-10-20 PROBLEM — A41.9 SEVERE SEPSIS (HCC): Status: ACTIVE | Noted: 2019-01-01

## 2019-10-20 PROBLEM — E87.2 HIGH ANION GAP METABOLIC ACIDOSIS: Status: ACTIVE | Noted: 2019-01-01

## 2019-10-20 PROBLEM — R74.01 TRANSAMINITIS: Status: ACTIVE | Noted: 2019-01-01

## 2019-10-20 PROBLEM — R77.8 ELEVATED TROPONIN: Status: ACTIVE | Noted: 2019-01-01

## 2019-10-20 NOTE — ASSESSMENT & PLAN NOTE
Agggressive fluid resuscitation  Follow results of A/P scan  Patient remains acidotic despite significant bicarbonate administration  Patient has made 15 cc of urien despite 4L of resuscitation  Nephrology contacted overnight and CRRT recommended  Will formally consult nephrology

## 2019-10-20 NOTE — ED NOTES
Pt arrived to ED on paced rhythm  Transferred to our monitor and found to be on sinus bradycardia  Pt paced and given epinephrine, and is now sustaining her own rhythm  HR 84, /70        Juan Guzman RN  10/20/19 3625

## 2019-10-20 NOTE — RESPIRATORY THERAPY NOTE
RT Protocol Note  [place] And [de-identified] 79 y o  female MRN: [id number]  Unit/Bed#:  Encounter: [id number]    Assessment    Principal Problem:    Acute respiratory failure with hypoxia and hypercapnia (HCC)  Active Problems:    Acute encephalopathy    Shock ([facility] [address] [address] )    Acute kidney injury ([facility] )    Transaminitis    Lactic acidosis    High anion gap metabolic acidosis    Severe sepsis (HCC)    Elevated troponin    Hypothermia      Home Pulmonary Medications:  NKA       No past medical history on file    Social History     Socioeconomic History    Marital status: Single     Spouse name: Not on file    Number of children: Not on file    Years of education: Not on file    Highest education level: Not on file   Occupational History    Not on file   Social Needs    Financial resource strain: Not on file    Food insecurity:     Worry: Not on file     Inability: Not on file    Transportation needs:     Medical: Not on file     Non-medical: Not on file   Tobacco Use    Smoking status: Not on file   Substance and Sexual Activity    Alcohol use: Not on file    Drug use: Not on file    Sexual activity: Not on file   Lifestyle    Physical activity:     Days per week: Not on file     Minutes per session: Not on file    Stress: Not on file   Relationships    Social connections:     Talks on phone: Not on file     Gets together: Not on file     Attends Rastafari service: Not on file     Active member of club or organization: Not on file     Attends meetings of clubs or organizations: Not on file     Relationship status: Not on file    Intimate partner violence:     Fear of current or ex partner: Not on file     Emotionally abused: Not on file     Physically abused: Not on file     Forced sexual activity: Not on file   Other Topics Concern    Not on file   Social History Narrative    Not on file       Subjective  Pt intubated       Objective    Physical Exam:   Assessment Type: Assess only  General Appearance: Eyes open/responds to stimulus, Eyes open/responds to voice  Respiratory Pattern: Assisted  Chest Assessment: Chest expansion symmetrical  Bilateral Breath Sounds: Expiratory wheezes, Diminished, Coarse  O2 Device: vent    Vitals:  Blood pressure 129/80, pulse 87, temperature (!) 89 4 °F (31 9 °C), resp  rate (!) 33, weight 80 9 kg (178 lb 5 6 oz), SpO2 93 %  Results from last 7 days   Lab Units 10/20/19  0222   PH ART  7 071*   PCO2 ART mm Hg 45 3*   PO2 ART mm Hg 79 9   HCO3 ART mmol/L 12 9*   BASE EXC ART mmol/L -16 7   O2 CONTENT ART mL/dL 15 3*   O2 HGB, ARTERIAL % 89 5*   ABG SOURCE  Line, Arterial       Imaging and other studies: I have personally reviewed pertinent reports        O2 Device: vent     Plan    Respiratory Plan: Vent/NIV/HFNC   Xop/Atr Q6

## 2019-10-20 NOTE — CONSULTS
Consultation - Medical Toxicology  Alpha Three [place] And [de-identified] 79 y o  female MRN: [id number]  Unit/Bed#:  Encounter: [id number]      Reason for Consult / Principal Problem: MSOD  Inpatient consult to Toxicology  Consult performed by: Angel Zuluaga MD  Consult ordered by: Carlos Jung MD        10/20/19      ASSESSMENT:  1) Profound acidemia due to metabolic acidosis with elevated anion gap  2) Acute kidney injury  3) Hypocalcemia  4) Hyperphosphatemia  5) Acute liver injury  6) Detectable serum acetaminophen concentration  7) Elevated troponin  8) Acute encephalopathy  9) Acute respiratory failure  10) Bradycardia  11) Hypotension  12) Hypothermia  13) Positive UDS for multiple agents      DISCUSSION/ RECOMMENDATIONS:  This patient, unknown name age and unknown medical history, presented overnight with profound acidemia due to metabolic acidosis with elevated anion gap  She was bradycardic, hypotensive, and hypothermic  At time of initial presentation BUN was 41 and creatinine was 2 35  AST was very mildly elevated  Troponin was also mildly elevated  She was intubated, started on pressors, and admitted to the ICU  She is on a sodium bicarbonate drip but still remains quite acidemic with persistent metabolic acidosis  Differential diagnosis for the patient's presentation does remain broad  From a toxicology perspective this presentation is highly suspicious for ethylene glycol ingestion  The patient presented with a pH of 6 9 and a serum bicarbonate of 8, along with an MELONY and hypocalcemia, all suggestive of possible ethylene glycol toxicity  At time of my initial conversation with the ICU this morning I recommended that the patient receive loading dose of fomepazole, 15 mg/kg IV, which she is getting now, and that an ethylene glycol level be sent  I will follow up with [facility] labs regarding results of ethylene glycol and metabolites    Until we have that result I would recommend that the patient receive maintenance dosing of fomepizole, 10 mg/kg IV every 6 hours after receiving the loading dose  Normally dosing would be every 12 hours, however fomepizole is dialyzable and so while the patient is on CRRT she will need to receive it every 6 hours  Depending on results of ethylene glycol and metabolite testing, I will then recommend whether further treatment with fomepizole as needed and whether the patient needs continued CRRT from toxicology perspective  Bicarbonate drip can be continued  Salicylate level checked this morning was negative  Blood glucose was mildly low on arrival last night but has since improved, and so with continued acidemia in spite of this improvement I do not think this is AKA  The patient did present with bradycardia and hypotension, and it is possible that the presenting acidosis end-organ dysfunction is due to hypoperfusion  We do not have any history on this patient and it is unknown what medication she is on  This could certainly be consistent with beta-blocker overdose or digoxin toxicity  I do not suspect calcium channel blocker toxicity as patients with severe calcium channel blocker overdose present with significant hyperglycemia  I would recommend that a digoxin level be checked and if elevated we would then treat with DigiFab  The patient is currently on multiple pressors which would be the appropriate treatment for beta-blocker toxicity  One other option if the patient does continue to have refractory hypotension in spite of multiple pressors would be high dose insulin euglycemia therapy (HIE)  Initial bolus dose of 1 unit/kilogram of insulin (in this case 85 units) could be given with assessment for response within 20-30 minutes after administration  If that is ineffective, a higher bolus dose of 2 units/kilogram (170 units) could be tried    If the patient is responsive to a bolus dose, she could then be started on an infusion starting at 1 unit/kg per hour with titration up to affect  She would likely develop hypoglycemia and so blood sugars would need to be carefully monitored with dextrose administered as needed  Often high doses of dextrose including potential for D25 or D50 infusion are needed  I would not recommend glucagon at this point  If the patient has a cardiac arrest it would be reasonable to give intralipid as a last resort, however I would not recommend trying this outside of the setting of cardiac arrest     An acetaminophen concentration was checked this morning and was detectable at 4 6 ug/mL  AST was mildly elevated on presentation and transaminases significantly trending up now  I spoke with the lab and asked them to run and acetaminophen concentration off of presenting blood last night, and this was detectable at 5 5 ug/mL  This does raise the strong possibility of delayed presenting acetaminophen overdose, and I have recommended that N-acetylcysteine be started  The patient will get the normal initial dose, however the second two doses have been doubled as the patient is on CRRT and NAC is dialyzable  Stop criteria for NAC will be transaminases which have clearly peaked and declined and INR under 2 0  I would recommend that the patient receive at minimum the full 21 hour course which I have ordered  Please recheck CMP and INR one hour prior to end of NAC course  If stop criteria have not been met at that time, NAC should then be continued indefinitely at a rate of 12 5 mg/kg/hr if the patient remains on CRRT, and at 6 25 mg/kg/hr if she is off of CRRT  It is impossible to entirely interpret the patient's urine drug screen given her Unknown history and uncertainty of which medications she is on, especially as there are several medications that can cause false positives on urine drug screen  However given the multiple positives present I have very high suspicion of drug abuse    Additionally, given the multiple positives and the detectable acetaminophen concentration, this increases my suspicion for toxicological cause of the patient's presentation whether intentional or accidental   Additionally the patient presented with profound acidemia but at time of presentation had transaminases which were not significantly elevated, only mildly elevated troponin, and creatinine which was under 2 5  This degree of end-organ dysfunction on presentation is less than what I would expect if the patient's presenting acidemia was simply due to hypoperfusion, and so this additionally raises my suspicion that acidemia may be due to a primary cause such as toxic alcohols  It is however possible that as the patient was quite hypothermic on presentation, the evidence of end-organ dysfunction due to hypoperfusion, whether due to toxicological cause or not, had just not yet fully manifested  Continued primary medical management per ICU team   Overall toxicology recommendations as above are fomepizole, continued CRRT, check ethylene glycol level, N-acetylcysteine, check digoxin level and treat if elevated, continued pressors as needed, and consideration of high-dose insulin euglycemia therapy if blood pressure refractory to pressors  We will continue to follow and I will call when ethylene glycol and metabolite level resulted to discuss further  Please contact me to discuss right away if digoxin level is elevated, or if you have any other questions or concerns  Hx and PE limited by: Patient intubated, AMS    HPI:  This patient is a woman of unknown age and unknown identity who presented to the emergency department last night with minimal responsiveness, bradycardia and hypotension, hypothermia, and profound acidemia  She was intubated, started on pressors, and admitted to the ICU  Toxicology service was not on-call overnight, however a Tiger Text was sent overnight alerting us to the presence of this patient    The [email or web address] Text automated response from our service advised contacting the [facility] overnight if any emergent consultation was needed, and that we would otherwise follow-up in the morning  ICU team continued broad workup and aggressive management overnight  I followed up on the message this morning and discussed the patient's presentation and labs with ICU team   Initially last night there had been some thought that the identity of the patient may have been found, however on discussion this morning that information has since been felt to be unreliable  Unfortunately at this time we still have not been able to obtain any additional information  The patient does open her eyes to loud voice and will follow some commands, however she does not nod her head yes or no to any of my questions and I am unable to obtain any history at all from her  Review of Systems   Unable to perform ROS: Intubated       Historical Information   No past medical history on file  No past surgical history on file  Social History   Social History     Substance and Sexual Activity   Alcohol Use Not on file     Social History     Substance and Sexual Activity   Drug Use Not on file     Social History     Tobacco Use   Smoking Status Not on file     No family history on file       Prior to Admission medications    Not on File       Current Facility-Administered Medications   Medication Dose Route Frequency    acetylcysteine (ACETADOTE) 12,720 mg in dextrose 5 % 200 mL IVPB  150 mg/kg Intravenous Once    acetylcysteine (ACETADOTE) 16,960 mg in dextrose 5 % 1,000 mL IVPB  200 mg/kg Intravenous Once    acetylcysteine (ACETADOTE) 8,480 mg in dextrose 5 % 500 mL IVPB  100 mg/kg Intravenous Once    ascorbic acid 1,500 mg in sodium chloride 0 9 % 100 mL IVPB  1,500 mg Intravenous Q6H    cefepime (MAXIPIME) 2,000 mg in dextrose 5 % 50 mL IVPB  2,000 mg Intravenous Once    chlorhexidine (PERIDEX) 0 12 % oral rinse 15 mL  15 mL Swish & Spit Q12H [facility]  dextrose 50 % IV solution **ADS Override Pull**        EPINEPHrine 6000 mcg (DOUBLE CONCENTRATION) IV in sodium chloride 0 9% 250 mL  1-10 mcg/min Intravenous Titrated    fentaNYL 1000 mcg in sodium chloride 0 9% 100mL infusion  150 mcg/hr Intravenous Continuous    fentanyl citrate (PF) 100 MCG/2ML **ADS Override Pull**        fentanyl citrate (PF) 100 MCG/2ML 100 mcg  100 mcg Intravenous Q1H PRN    fomepizole (ANTIZOL) 1,300 mg in sodium chloride 0 9 % 100 mL IVPB  15 mg/kg Intravenous Once    heparin (porcine) subcutaneous injection 5,000 Units  5,000 Units Subcutaneous Q8H [address]    hydrocortisone sodium succinate (PF) (Solu-CORTEF) injection 50 mg  50 mg Intravenous Q6H [address]    ipratropium (ATROVENT) 0 02 % inhalation solution 0 5 mg  0 5 mg Nebulization Q6H    levalbuterol (XOPENEX) inhalation solution 1 25 mg  1 25 mg Nebulization Q6H    norepinephrine (LEVOPHED) 8 mg (DOUBLE CONCENTRATION) IV in sodium chloride 0 9% 250 mL  1-30 mcg/min Intravenous Titrated    NxStage K 2/Ca 3 dialysis solution (RFP-400) 20,000 mL  20,000 mL Dialysis Continuous    pantoprazole (PROTONIX) injection 40 mg  40 mg Intravenous Q24H HARJEET    phenylephrine (GIORGIO-SYNEPHRINE) 100 mg (DOUBLE CONCENTRATION) IV in sodium chloride 0 9% 250 mL   mcg/min Intravenous Titrated    sodium bicarbonate 150 mEq in dextrose 5 % 1,000 mL infusion  125 mL/hr Intravenous Continuous    sodium chloride (concentrated) 154 mEq in dextrose 10 % 1,000 mL infusion  50 mL/hr Intravenous Continuous    thiamine (VITAMIN B1) 200 mg in sodium chloride 0 9 % 50 mL IVPB  200 mg Intravenous Q12H    [START ON 10/21/2019] vancomycin (VANCOCIN) 1,500 mg in sodium chloride 0 9 % 250 mL IVPB  1,500 mg Intravenous Q24H    vasopressin (PITRESSIN) 20 Units in sodium chloride 0 9 % 100 mL infusion  0 03 Units/min Intravenous Continuous       Allergies not on file    Objective       Intake/Output Summary (Last 24 hours) at 10/20/2019 1200  Last data filed at 10/20/2019 0715  Gross per 24 hour   Intake 2655 03 ml   Output 756 ml   Net 1899 03 ml       Invasive Devices:   CVC Central Lines 10/20/19 Triple 20cm (Active)   Line Necessity Reviewed Yes, reviewed with provider 10/20/2019  1:33 AM   Site Assessment Intact 10/20/2019  1:33 AM   Proximal Lumen (Red Port-PICC only) Status Infusing 10/20/2019  1:33 AM   Medial Lumen Status Infusing 10/20/2019  1:33 AM   Distal Lumen ([place] Port-PICC only) Status Infusing 10/20/2019  1:33 AM   Dressing Type Chlorhexidine dressing 10/20/2019  1:33 AM   Dressing Status Intact; Old drainage 10/20/2019  1:33 AM       Peripheral IV 10/19/19 Left Antecubital (Active)   Site Assessment Clean;Dry; Intact 10/20/2019  1:33 AM   Dressing Type Transparent 10/20/2019  1:33 AM   Line Status Blood return noted; Flushed; Infusing 10/20/2019  1:33 AM   Dressing Status Clean;Dry; Intact 10/20/2019  1:33 AM   Dressing Change Due 10/23/19 10/20/2019  1:33 AM   Reason Not Rotated Not due 10/20/2019  1:33 AM       Peripheral IV 10/19/19 Right Hand (Active)   Site Assessment Clean;Dry; Intact 10/20/2019  1:33 AM   Dressing Type Transparent 10/20/2019  1:33 AM   Line Status Blood return noted; Flushed;Saline locked 10/20/2019  1:33 AM   Dressing Status Clean;Dry; Intact 10/20/2019  1:33 AM   Dressing Change Due 10/23/19 10/20/2019  1:33 AM   Reason Not Rotated Not due 10/20/2019  1:33 AM       NG/OG/Enteral Tube Orogastric 18 Fr Right mouth (Active)   Placement Reverification Auscultation 10/19/2019 11:08 PM   Site Assessment Clean;Dry; Intact 10/19/2019 11:08 PM   Status Suction-low intermittent 10/19/2019 11:08 PM   Drainage Appearance None 10/19/2019 11:08 PM       Urethral Catheter Temperature probe 16 Fr   (Active)   Amt returned on insertion(mL) 0 mL 10/19/2019 11:06 PM   Reasons to continue Urinary Catheter  Accurate I&O assessment in critically ill patients (48 hr  max) 10/19/2019 11:06 PM   Site Assessment Clean;Skin intact 10/19/2019 11:06 PM Collection Container Standard drainage bag 10/19/2019 11:06 PM   Output (mL) 0 mL 10/20/2019  6:00 AM       ETT  Cuffed;Oral 6 5 mm (Active)   Secured at (cm) 23 10/20/2019 11:22 AM   Measured from Lips 10/20/2019 [address] 10/20/2019 11:22 AM   Secured by Commercial tube hawkins 10/20/2019 11:22 AM   Site Condition Dry 10/20/2019 11:22 AM   Cuff Pressure (cm H2O) 26 cm H2O 10/20/2019 11:22 AM   HI-LO Suction  Other (Comment) 10/20/2019  4:50 AM       Arterial Line 10/20/19 Radial (Active)   Site Assessment Clean;Dry; Intact 10/20/2019  1:33 AM   Line Status Positional;Pulsatile blood flow 10/20/2019  1:33 AM   Art Line Waveform Appropriate 10/20/2019  1:33 AM   Art Line Interventions Leveled; Flushed per protocol;Line pulled back; Connections checked and tightened 10/20/2019  1:33 AM   Color/Movement/Sensation Capillary refill less than 3 sec 10/20/2019  1:33 AM   Dressing Type Transparent 10/20/2019  1:33 AM   Dressing Status Clean;Dry; Intact 10/20/2019  1:33 AM       Temporary HD Catheter (Active)   Line Necessity Reviewed Yes, reviewed with provider 10/20/2019  4:30 AM   Site Assessment Clean;Dry; Intact 10/20/2019  7:00 AM   Proximal Lumen (Red Port-PICC only) Status Infusing 10/20/2019  7:00 AM   Distal Lumen ([place] Port-PICC only) Status Infusing 10/20/2019  7:00 AM   Line Care Connections checked and tightened 10/20/2019  7:00 AM   Dressing Type Chlorhexidine dressing 10/20/2019  7:00 AM   Dressing Status Clean;Dry; Intact 10/20/2019  7:00 AM       Vitals   Vitals:    10/20/19 0700 10/20/19 0800 10/20/19 0914 10/20/19 1122   BP:  91/65     TempSrc: Core Rectal     Pulse: 104 103 (!) 106 96   Resp: (!) 23 (!) 24 (!) 30 (!) 29   Patient Position - Orthostatic VS:  Lying     Temp: 98 8 °F (37 1 °C) 99 5 °F (37 5 °C)         Physical Exam   Constitutional: She appears well-developed and well-nourished  No distress  HENT:   Head: Normocephalic and atraumatic     Mouth/Throat: Oropharynx is clear and moist    ETT in place   Eyes: Pupils are equal, round, and reactive to light  Conjunctivae are normal    Neck: Neck supple  Cardiovascular: Normal rate, regular rhythm, normal heart sounds and intact distal pulses  Pulmonary/Chest:   Intubated and mechanically ventilated  Coarse breath sounds throughout  Abdominal: Soft  She exhibits no distension  Hypoactive bowel sounds   Neurological:   Opens eyes to loud voice  Follows commands with all extremities  No evident focal deficit  2+ bilateral patellar DTRs  No clonus  Normal muscle tone  Skin: Skin is warm and dry  Vitals reviewed  EKG, Pathology, and Other Studies: I have personally reviewed pertinent reports  Lab Results: I have personally reviewed pertinent reports        Labs:  Results from last 7 days   Lab Units 10/20/19  1059 10/20/19  0520 10/19/19  2301   WBC Thousand/uL  --  2 63* 14 73*   HEMOGLOBIN g/dL  --  12 0 11 2*   I STAT HEMOGLOBIN g/dl 14 3  --   --    HEMATOCRIT %  --  39 1 39 3   HEMATOCRIT, ISTAT % 42  --   --    PLATELETS Thousands/uL  --  210 225   NEUTROS PCT %  --   --  73   LYMPHS PCT %  --   --  17   LYMPHO PCT %  --  8*  --    MONOS PCT %  --   --  7   MONO PCT %  --  0*  --       Results from last 7 days   Lab Units 10/20/19  1059 10/20/19  0933 10/20/19  0520 10/20/19  0257   POTASSIUM mmol/L  --  6 0* 4 2 4 8   CHLORIDE mmol/L  --  108 112* 111*   CO2 mmol/L  --  15* 16* 16*   CO2, I-STAT mmol/L 16*  --   --   --    BUN mg/dL  --  37* 46* 50*   CREATININE mg/dL  --  2 01* 2 30* 2 61*   CALCIUM mg/dL  --  7 5* 7 2* 5 8*   ALK PHOS U/L  --   --  76  --    ALT U/L  --   --  392*  --    AST U/L  --   --  621*  --    GLUCOSE, ISTAT mg/dl 118  --   --   --    MAGNESIUM mg/dL  --  2 6 2 7* 3 2*   PHOSPHORUS mg/dL  --   --  9 6* 14 1*      Results from last 7 days   Lab Units 10/20/19  0520   INR  1 38*     Results from last 7 days   Lab Units 10/20/19  1019 10/20/19  0541 10/20/19  0408   LACTIC ACID mmol/L 9 7* 6 4* 7 1*     0   Lab Value Date/Time    TROPONINI 0 60 (H) 10/20/2019 1024    TROPONINI 0 32 (H) 10/20/2019 0520    TROPONINI 0 19 (H) 10/20/2019 0123    TROPONINI 0 08 (H) 10/19/2019 2301     Results from last 7 days   Lab Units 10/19/19  2301   PH KRISTEN  6 519*   PCO2 KRISTEN mm Hg 98 4*   PO2 KRISTEN mm Hg 137 3*   HCO3 KRISTEN mmol/L 7 8*   O2 CONTENT KRISTEN ml/dL 16 4   O2 HGB, VENOUS % 91 8*     Results from last 7 days   Lab Units 10/20/19  0933   ACETAMINOPHEN LVL ug/mL 4 6*   ETHANOL LVL mg/dL <3   SALICYLATE LVL mg/dL 3 5     Invalid input(s): EXTPREGUR    Imaging Studies: I have personally reviewed pertinent reports  Counseling / Coordination of Care  Total time spent today 104 minutes  Greater than 50% of total time was spent with the patient and / or family counseling and / or coordination of care       Minutes of critical care time: 104 minutes  -Critical care time was exclusive of seperately bilable procedures and treating other patients as well as teaching time    -Critical care was necessary to treat or prevent imminent or life-threatening deterioration of the following condition: cardiac failure, circulatory failure, CNS failure/comprimise, hepatic failure, metabolic crisis, renal failure, respiratory failure, shock  -Critical care time was spent personally by me on the following activities as well as the above as per the rest of chart:  obtaining history from patient/surrogate, developement of a treatment plan, discussions with primary provider(s), evaluation of patient's response to the treatment, examination of the patient, recommending treatements and interventions, re-evaluation of the patient's condition, recommending/reviewing laboratory studies, recommending/reviewing of radiographic studies

## 2019-10-20 NOTE — PROCEDURES
Arterial Line Insertion  Date/Time: 10/20/2019 11:00 AM  Performed by: Demetri Cloud MD  Authorized by: Demetri Cloud MD     Patient location:  Bedside  Other Assisting Provider: No    Consent:     Consent obtained:  Emergent situation  Universal protocol:     Immediately prior to procedure a time out was called: yes      Patient identity confirmed:  Provided demographic data  Indications:     Indications: hemodynamic monitoring, continuous blood pressure monitoring and frequent labs / infusion    Pre-procedure details:     Skin preparation:  Chlorhexidine    Preparation: Patient was prepped and draped in sterile fashion    Anesthesia (see MAR for exact dosages): Anesthesia method:  None  Procedure details:     Location / Tip of Catheter:  Other (comment) (axillary)    Laterality:  Left    Needle gauge: 5 Tamazight catheter      Placement technique:  Ultrasound guided    Number of attempts:  1    Successful placement: yes      Transducer: waveform confirmed    Post-procedure details:     Post-procedure:  Sutured and sterile dressing applied    CMS:  Unchanged    Complication (if applicable):  None  Comments:      Patient with ABP of 50/30, severely mottled on high dose pressors, no non invasive BP obtained, on ultrasound with sluggish pulsatility at axillary artery, 300 mcg epinephrine given and left axillary artery placed with sterile precautions

## 2019-10-20 NOTE — PROGRESS NOTES
Chika Thrasher from [facility] [employer] at patient's bedside with Abrahan Thompson RN, Emma Younger, JOJO and JOJO Aguilar RN and Amrit Swenson RN assisted in photographing patient's suspicious bruising and wounds  Pt's neck, R shoulder, R arm, R hand and fingernails, R upper arm tattoo, L upper arm tattoo, L arm, L hand and fingernails, BL knees, BL inner aspects of thighs and a wide angle picture of the patient

## 2019-10-20 NOTE — QUICK NOTE
I reviewed the patient's history, vitals, and labs this morning and discussed with Dr Iwona Cardozo  Given the persistent acidemia, MELONY, and hypocalcemia I would have concern for possibility of ethylene glycol ingestion and have recommended sending ethylene glycol level and giving fomepizole, 15 mg/kg IV  Patient is currently on CRRT  I will follow-up on ethylene glycol result this afternoon and call to advise as well if further fomepizole is needed this afternoon  Given transaminases going up as well I have recommended checking coma panel and starting NAC  Doubling rate of maintenance NAC doses as patient is on CRRT  I will write a full note later this afternoon

## 2019-10-20 NOTE — RESPIRATORY THERAPY NOTE
RT Ventilator Management Note  Alpha Alpha Three [place] And [de-identified] 79 y o  female MRN: [id number]  Unit/Bed#: TR 30 Encounter: [id number]      Daily Screen     No data found in the last 10 encounters  Physical Exam:   Assessment Type: Assess only  General Appearance: Sedated  Respiratory Pattern: Assisted  Chest Assessment: Chest expansion symmetrical      Resp Comments: Pt  brought in to ED by EMS intubated receiving O2 via BVM  Pt  placed on ventilator:  AC/VC+, 350, RR26, 8+, 100% FiO2  Currently pt  is sedated on full mechanical support  Will continue to monitor for changes

## 2019-10-20 NOTE — ED PROVIDER NOTES
History  Chief Complaint   Patient presents with    Apnea     Pt found unresponsive with GCS of 3 and no BP  Arrives to ED intubated being paced  Unknown age female presents unresponsive, intubated via EMS with unknown downtime after being found unresponsive on the ground  No history is available upon initial evaluation  No friends or family present and no additional information available from EMS on history  Per EMS, found with sinus bradycardia without blood pressure so pacing initiated by EMS with good capture and improvement in the patient's blood pressure but no improvement in neurologic exam     Patient met upon arrival to the ER  Upon arrival to the ER, initially no neurologic findings  No gag despite intubation, no movement spontaneously or with stimulation  No corneals with fixed pinpoint pupils  Patient being bagged with bilateral breath sounds with minimal crackles at the base; appropriate end tidal by EMS monitor  Patient with left femoral pulse that is not consistent with pacing but is present  Patient continued to be paced upon arrival to the emergency room  Atropine given without response  Initial preparations for temporary pacemaker however patient responded to 0 5 mg of epinephrine and an epinephrine drip was initiated  Pacing was discontinued and patient's native beat with perfusing rhythm  Following initiation of epinephrine with perfusing rhythm, patient with improvement in mottling of the lower extremities and patient minimal but repeatable movement of the left arm and lesser of the left leg  Possible movement of the right leg but no movement seen of the right arm  Patient given 50 mcg of fentanyl and 2 mg of midazolam for sedation    Initially infusions of these medications were ordered however they were held as patient had no repeat neurologic findings and to monitor exam     Impression and plan:  Unresponsive episode with symptomatic bradycardia with associated hypotension  This represents multiple potential etiologies  Unclear if patient is on beta-blockers or calcium channel blockers on initial evaluation but patient's bradycardia and hypotension improving with epinephrine, will continue supportive care regarding this  Considering continued need for vasopressors, central venous access placed in the ER  Patient's blood pressure and heart rate improved with epinephrine which was started due to concerns for potential cardiogenic shock of unclear etiology  Patient with limited neurologic exam, less on the right than the left  Patient with multiple abrasions and a lesion on the right medial thigh of unclear etiology  Considering this, will send patient for CT imaging on the way to ICU to evaluate for intracranial or other traumatic injuries  Initial imaging of the head reviewed by myself without clear signs of trauma so will admit here for continued stabilization  Initial VBG demonstrating severe combined acidosis with hypothermia  Patient being actively warmed and will be hyperventilated on the ventilator  Patient will require repeat assessment to evaluation for improvement identification of alternative etiologies  As supportive care improves will need continued evaluation for additional interventions and possible dialysis; admiting to the ICU for continued monitoring and evaluation  As minimal information was available, police were contacted to assist in identification of further information regarding the patient and for evaluation of inciting event  - Nursing note reviewed  Vitals reviewed  - Orders placed by myself and/or advanced practitioner / resident     - History obtained from EMS, limited  - There are significant limitations to the history obtained  Patient unresponsive, anonymous patient with no history available    - Critical care time: 67 minutes   - Patient does not need initiation of IV thrombolytics: last Known well was unknown  - Critical care time was exclusive of seperately bilable procedures and treating other patients as well as teaching time  - Critical care was necessary to treat or prevent imminent or life-threatening deterioration of the following condition: cardiac failure  - Critical care time was spent personally by me on the following activities as well as the above as per the ED course and rest of chart: blood draw for specimens, obtaining history from patient / surrogate, developement of a treatment plan, discussions with consultants, evaluation of patient's response to the treatment, examination of the patient, ordering/performing treatements and interventions, re-evaluation of the patient's condition, review of old charts, ordering/reviewing laboratory studies, ordering/reviewing of radiographic studies      Altered Mental Status   Presenting symptoms: unresponsiveness    Severity:  Unable to specify  Episode history:  Unable to specify  Timing:  Unable to specify  Progression:  Unable to specify      None       No past medical history on file  No past surgical history on file  No family history on file  I have reviewed and agree with the history as documented  Social History     Tobacco Use    Smoking status: Not on file   Substance Use Topics    Alcohol use: Not on file    Drug use: Not on file        Review of Systems   Unable to perform ROS: Patient unresponsive       Physical Exam  Physical Exam   Constitutional: No distress  HENT:   Head: Normocephalic and atraumatic  ETT in place  Eyes: Conjunctivae are normal    Pupils 2 mm without reactivity initial    Neck: No tracheal deviation present  No thyromegaly present  Cardiovascular: Intact distal pulses  Pacing in place, palpable femoral pulse  Pulmonary/Chest: She has rales  Assisted ventilation with bagging  Abdominal: She exhibits distension  There is no tenderness  There is no rebound  Musculoskeletal: She exhibits no edema     Mottled extremities  Neurological: She is unresponsive  GCS eye subscore is 1  GCS verbal subscore is 1  GCS motor subscore is 1  Skin: She is not diaphoretic  There is pallor  Pale with mottled extremities         Vital Signs  ED Triage Vitals   Temperature Pulse Respirations Blood Pressure SpO2   10/19/19 2300 10/19/19 2254 10/19/19 2254 10/19/19 2254 10/19/19 2315   (!) 87 1 °F (30 6 °C) 96 (!) 40 119/72 97 %      Temp Source Heart Rate Source Patient Position - Orthostatic VS BP Location FiO2 (%)   10/19/19 2300 10/19/19 2254 10/19/19 2254 10/19/19 2254 10/20/19 0131   Probe Monitor Lying Right arm 100      Pain Score       --                  Vitals:    10/20/19 2000 10/20/19 2015 10/20/19 2030 10/20/19 2045   BP:       Pulse: 100 (!) 110 (!) 109 (!) 109   Patient Position - Orthostatic VS:             Visual Acuity  Visual Acuity      Most Recent Value   L Pupil Size (mm)  3   R Pupil Size (mm)  3   L Pupil Shape  Round   R Pupil Shape  Round          ED Medications  Medications   midazolam (VERSED) 2 mg/2 mL injection **ADS Override Pull** (has no administration in time range)   fentanyl citrate (PF) 100 MCG/2ML **ADS Override Pull** (has no administration in time range)   chlorhexidine (PERIDEX) 0 12 % oral rinse 15 mL (15 mL Swish & Spit Given 10/20/19 0900)   heparin (porcine) subcutaneous injection 5,000 Units (5,000 Units Subcutaneous Given 10/20/19 1414)   dextrose 50 % IV solution **ADS Override Pull** (  Canceled Entry 10/20/19 0135)   pantoprazole (PROTONIX) injection 40 mg (40 mg Intravenous Given 10/20/19 0900)   fentaNYL 1000 mcg in sodium chloride 0 9% 100mL infusion (0 mcg/hr Intravenous Hold 10/20/19 1058)   sodium bicarbonate 150 mEq in dextrose 5 % 1,000 mL infusion (250 mL/hr Intravenous New Bag 10/20/19 1807)   hydrocortisone sodium succinate (PF) (Solu-CORTEF) injection 50 mg (50 mg Intravenous Given 10/20/19 1594)   fentanyl citrate (PF) 100 MCG/2ML **ADS Override Pull** (  Canceled Entry 10/20/19 0213)   levalbuterol (XOPENEX) inhalation solution 1 25 mg (1 25 mg Nebulization Given 10/20/19 1952)   ipratropium (ATROVENT) 0 02 % inhalation solution 0 5 mg (0 5 mg Nebulization Given 10/20/19 1951)   fentanyl citrate (PF) 100 MCG/2ML 100 mcg (has no administration in time range)   ascorbic acid 1,500 mg in sodium chloride 0 9 % 100 mL IVPB (1,500 mg Intravenous New Bag 10/20/19 1955)   thiamine (VITAMIN B1) 200 mg in sodium chloride 0 9 % 50 mL IVPB (0 mg Intravenous Stopped 10/20/19 1820)   vancomycin (VANCOCIN) 1,500 mg in sodium chloride 0 9 % 250 mL IVPB (has no administration in time range)   vasopressin (PITRESSIN) 20 Units in sodium chloride 0 9 % 100 mL infusion (0 04 Units/min Intravenous New Bag 10/20/19 1631)   acetylcysteine (ACETADOTE) 16,960 mg in dextrose 5 % 1,000 mL IVPB (16,960 mg Intravenous New Bag 10/20/19 1755)   EPINEPHrine 6000 mcg (DOUBLE CONCENTRATION) IV in sodium chloride 0 9% 250 mL (20 mcg/min Intravenous New Bag 10/20/19 1709)   norepinephrine (LEVOPHED) 8 mg (DOUBLE CONCENTRATION) IV in sodium chloride 0 9% 250 mL (30 mcg/min Intravenous New Bag 10/20/19 2045)   phenylephrine (GIORGIO-SYNEPHRINE) 100 mg (DOUBLE CONCENTRATION) IV in sodium chloride 0 9% 250 mL (130 mcg/min Intravenous Rate/Dose Change 10/20/19 2011)   cefTRIAXone (ROCEPHIN) 1,000 mg in dextrose 5 % 50 mL IVPB (0 mg Intravenous Stopped 10/20/19 1650)   calcium gluconate 1 g in sodium chloride 0 9 % 100 mL IVPB (1 g Intravenous New Bag 10/20/19 1931)   NxStage K 0/Ca 3 dialysis solution (RFP-402) 20,000 mL (has no administration in time range)   methylene blue (PROVAYBLUE) 169 5 mg in dextrose 5 % 50 mL IVPB (169 5 mg Intravenous New Bag 10/20/19 2019)   sodium bicarbonate (FOR EMS ONLY) 8 4 % injection 50 mEq (0 mEq Does not apply Given to EMS 10/19/19 9659)   cefepime (MAXIPIME) 2,000 mg in dextrose 5 % 50 mL IVPB (0 mg Intravenous Stopped 10/20/19 0015)   vancomycin (VANCOCIN) 1,250 mg in sodium chloride 0 9 % 250 mL IVPB (0 mg Intravenous Stopped 10/20/19 0222)   multi-electrolyte (ISOLYTE-S PH 7 4) bolus 1,000 mL (1,000 mL Intravenous New Bag 10/20/19 0114)   dextrose 50 % IV solution 50 mL (50 mL Intravenous Given 10/20/19 0130)   cefepime (MAXIPIME) 2,000 mg in dextrose 5 % 50 mL IVPB (0 mg Intravenous Stopped 10/20/19 1225)   sodium bicarbonate 8 4 % injection 50 mEq (50 mEq Intravenous Given 10/20/19 0149)   sodium bicarbonate (FOR EMS ONLY) 8 4 % injection 50 mEq (0 mEq Does not apply Given to EMS 10/20/19 0149)   calcium chloride 2 g in sodium chloride 0 9 % 100 mL IVPB (0 g Intravenous Stopped 10/20/19 0330)   fentanyl citrate (PF) 100 MCG/2ML 100 mcg (100 mcg Intravenous Given 10/20/19 0211)   fentanyl citrate (PF) 100 MCG/2ML 200 mcg (100 mcg Intravenous Given 10/20/19 0330)   vancomycin (VANCOCIN) IVPB (premix) 500 mg (0 mg Intravenous Stopped 10/20/19 0715)   calcium gluconate 3 g in sodium chloride 0 9 % 100 mL IVPB (0 g Intravenous Stopped 10/20/19 1010)   acetylcysteine (ACETADOTE) 12,720 mg in dextrose 5 % 200 mL IVPB (0 mg Intravenous Stopped 10/20/19 1252)   acetylcysteine (ACETADOTE) 8,480 mg in dextrose 5 % 500 mL IVPB (0 mg Intravenous Stopped 10/20/19 1800)   fomepizole (ANTIZOL) 1,300 mg in sodium chloride 0 9 % 100 mL IVPB (0 mg Intravenous Stopped 10/20/19 1216)   sodium bicarbonate 8 4 % injection 100 mEq (50 mEq Intravenous Given 10/20/19 0148)   calcium gluconate 3 g in sodium chloride 0 9 % 100 mL IVPB (0 g Intravenous Stopped 10/20/19 1725)   fomepizole (ANTIZOL) 800 mg in sodium chloride 0 9 % 100 mL IVPB (0 mg Intravenous Stopped 10/20/19 1830)   sodium bicarbonate 8 4 % injection 50 mEq (50 mEq Intravenous Given 10/20/19 2007)   sodium bicarbonate 8 4 % injection 50 mEq (50 mEq Intravenous Given 10/20/19 2006)   calcium chloride 10 % injection 1 g (1 g Intravenous Given 10/20/19 2007)       Diagnostic Studies  Results Reviewed     Procedure Component Value Units Date/Time    Sputum culture and Gram stain [[id number]]  (Abnormal) Collected:  10/20/19 0432    Lab Status:  Preliminary result Specimen:  Induced Sputum Updated:  10/20/19 1557     Gram Stain Result 2+ Polys      3+ Gram positive cocci in pairs, chains and clusters      Rare Gram positive rods      Rare Gram negative rods    Legionella antigen, urine [[id number]]  (Normal) Collected:  10/20/19 0506    Lab Status:  Final result Specimen:  Urine, Catheter Updated:  10/20/19 1554     Legionella Urinary Antigen Negative    Strep Pneumoniae, Urine [[id number]]  (Abnormal) Collected:  10/20/19 0506    Lab Status:  Final result Specimen:  Urine, Catheter Updated:  10/20/19 1548     Strep pneumoniae antigen, urine Positive    Acetaminophen level-"If concentration is detectable, please discuss with medical  on call " [[id number]]  (Abnormal) Collected:  10/19/19 2301    Lab Status:  Final result Specimen:  Blood from Arm, Right Updated:  10/20/19 1034     Acetaminophen Level 5 5 ug/mL     UA w Reflex to Microscopic [[id number]]  (Abnormal) Collected:  10/20/19 0506    Lab Status:  Final result Specimen:  Urine, Clean Catch Updated:  10/20/19 0529     Color, UA Dk Yellow     Clarity, UA Cloudy     Specific Gravity, UA 1 025     pH, UA 5 0     Leukocytes, UA Negative     Nitrite, UA Negative     Protein,  (2+) mg/dl      Glucose,  (1/10%) mg/dl      Ketones, UA Trace mg/dl      Urobilinogen, UA 2 0 E U /dl      Bilirubin, UA Small     Blood, UA Large    Influenza A/B and RSV by PCR [[id number]] Collected:  10/20/19 0041    Lab Status: In process Specimen:  Nasopharyngeal Swab Updated:  10/20/19 0044    MRSA culture [[id number]] Collected:  10/20/19 0041    Lab Status:   In process Specimen:  Nares from Nose Updated:  10/20/19 0044    CKMB [[id number]]  (Normal) Collected:  10/19/19 2337    Lab Status:  Final result Specimen:  Blood from Arm, Right Updated:  10/20/19 0022     CK-MB Index 2 4 %      CK-MB 5 0 ng/mL     CK [[id number]]  (Abnormal) Collected:  10/19/19 2337    Lab Status:  Final result Specimen:  Blood from Arm, Right Updated:  10/20/19 0017     Total  U/L     Lactic acid, plasma [[id number]]  (Abnormal) Collected:  10/19/19 2301    Lab Status:  Final result Specimen:  Blood from Arm, Right Updated:  10/19/19 2345     LACTIC ACID 15 9 mmol/L     Narrative:       Result may be elevated if tourniquet was used during collection  Blood culture #1 [[id number]] Collected:  10/19/19 2336    Lab Status: In process Specimen:  Blood from Arm, Right Updated:  10/19/19 2341    Blood culture #2 [[id number]] Collected:  10/19/19 2336    Lab Status:   In process Specimen:  Blood from Arm, Left Updated:  10/19/19 6823    Basic metabolic panel [[id number]]  (Abnormal) Collected:  10/19/19 2301    Lab Status:  Final result Specimen:  Blood from Arm, Right Updated:  10/19/19 2337     Sodium 156 mmol/L      Potassium 4 8 mmol/L      Chloride 114 mmol/L      CO2 15 mmol/L      ANION GAP 27 mmol/L      BUN 41 mg/dL      Creatinine 2 35 mg/dL      Glucose 64 mg/dL      Calcium 7 1 mg/dL      eGFR 12 ml/min/1 73sq m     Narrative:       [place] guidelines for Chronic Kidney Disease (CKD):     Stage 1 with normal or high GFR (GFR > 90 mL/min/1 73 square meters)    Stage 2 Mild CKD (GFR = 60-89 mL/min/1 73 square meters)    Stage 3A Moderate CKD (GFR = 45-59 mL/min/1 73 square meters)    Stage 3B Moderate CKD (GFR = 30-44 mL/min/1 73 square meters)    Stage 4 Severe CKD (GFR = 15-29 mL/min/1 73 square meters)    Stage 5 End Stage CKD (GFR <15 mL/min/1 73 square meters)  Note: GFR calculation is accurate only with a steady state creatinine    Hepatic function panel [[id number]]  (Abnormal) Collected:  10/19/19 2301    Lab Status:  Final result Specimen:  Blood from Arm, Right Updated:  10/19/19 2337     Total Bilirubin 0 70 mg/dL      Bilirubin, Direct 0 23 mg/dL      Alkaline Phosphatase 73 U/L      AST 85 U/L      ALT 40 U/L      Total Protein 4 5 g/dL      Albumin 2 2 g/dL     TSH, 3rd generation with Free T4 reflex [[id number]]  (Normal) Collected:  10/19/19 2301    Lab Status:  Final result Specimen:  Blood from Arm, Right Updated:  10/19/19 2337     TSH 3RD GENERATON 3 303 uIU/mL     Narrative:       Patients undergoing fluorescein dye angiography may retain small amounts of fluorescein in the body for 48-72 hours post procedure  Samples containing fluorescein can produce falsely depressed TSH values  If the patient had this procedure,a specimen should be resubmitted post fluorescein clearance        Troponin I [[id number]]  (Abnormal) Collected:  10/19/19 2301    Lab Status:  Final result Specimen:  Blood from Arm, Right Updated:  10/19/19 2329     Troponin I 0 08 ng/mL     Blood gas, venous [[id number]]  (Abnormal) Collected:  10/19/19 2301    Lab Status:  Final result Specimen:  Blood from Arm, Right Updated:  10/19/19 2308     pH, Philip 6 519     pCO2, Philip 98 4 mm Hg      pO2, Philip 137 3 mm Hg      HCO3, Philip 7 8 mmol/L      Base Excess, Philip -32 6 mmol/L      O2 Content, Philip 16 4 ml/dL      O2 HGB, VENOUS 91 8 %     CBC and differential [[id number]]  (Abnormal) Collected:  10/19/19 2301    Lab Status:  Final result Specimen:  Blood from Arm, Right Updated:  10/19/19 2307     WBC 14 73 Thousand/uL      RBC 3 52 Million/uL      Hemoglobin 11 2 g/dL      Hematocrit 39 3 %       fL      MCH 31 8 pg      MCHC 28 5 g/dL      RDW 14 1 %      MPV 9 9 fL      Platelets 467 Thousands/uL      nRBC 0 /100 WBCs      Neutrophils Relative 73 %      Immat GRANS % 2 %      Lymphocytes Relative 17 %      Monocytes Relative 7 %      Eosinophils Relative 1 %      Basophils Relative 0 %      Neutrophils Absolute 10 92 Thousands/µL      Immature Grans Absolute 0 23 Thousand/uL      Lymphocytes Absolute 2 45 Thousands/µL      Monocytes Absolute 1 02 Thousand/µL      Eosinophils Absolute 0 08 Thousand/µL      Basophils Absolute 0 03 Thousands/µL     POCT Blood Gas (CG8+) [[id number]]  (Abnormal) Collected:  10/19/19 2258    Lab Status:  Final result Specimen:  Venous Updated:  10/19/19 2306     ph, Philip ISTAT <6 599     pCO2, Philip i-STAT >103 0 mm HG      pO2, Philip i-STAT 123 0 mm HG      BE, i-STAT --     HCO3, Philip i-STAT --     CO2, i-STAT --     O2 Sat, i-STAT --     SODIUM, I-STAT 151 mmol/l      Potassium, i-STAT 4 7 mmol/L      Calcium, Ionized i-STAT 0 96 mmol/L      Hct, i-STAT 33 %      Hgb, i-STAT 11 2 g/dl      Glucose, i-STAT 62 mg/dl      Specimen Type VENOUS    POCT Chem 8+ [[id number]]  (Abnormal) Collected:  10/19/19 2257    Lab Status:  Final result Specimen:  Venous Updated:  10/19/19 2305     SODIUM, I-STAT 151 mmol/l      Potassium, i-STAT 4 7 mmol/L      Chloride, istat 120 mmol/L      CO2, i-STAT --     Anion Gap, i-STAT --     Calcium, Ionized i-STAT 0 97 mmol/L      BUN, I-STAT 47 mg/dl      Creatinine, i-STAT 1 9 mg/dl      eGFR 15 ml/min/1 73sq m      Glucose, i-STAT 60 mg/dl      Hct, i-STAT 33 %      Hgb, i-STAT 11 2 g/dl      Specimen Type VENOUS    Narrative:       [employer] guidelines for Chronic Kidney Disease (CKD):     Stage 1 with normal or high GFR (GFR > 90 mL/min/1 73 square meters)    Stage 2 Mild CKD (GFR = 60-89 mL/min/1 73 square meters)    Stage 3A Moderate CKD (GFR = 45-59 mL/min/1 73 square meters)    Stage 3B Moderate CKD (GFR = 30-44 mL/min/1 73 square meters)    Stage 4 Severe CKD (GFR = 15-29 mL/min/1 73 square meters)    Stage 5 End Stage CKD (GFR <15 mL/min/1 73 square meters)  Note: GFR calculation is accurate only with a steady state creatinine    Fingerstick Glucose (POCT) [[id number]]  (Normal) Collected:  10/19/19 2304    Lab Status:  Final result Updated:  10/19/19 2305     POC Glucose 67 mg/dl                  CT head without contrast   Final Result by Elisa Pina MD (10/20 1531)      No acute intracranial abnormality          Mucoperiosteal thickening air fluid levels may be allergic /inflammatory in nature  Occasionally these findings could be posttraumatic  The calvarium is intact  Pulmonary report was given by the teleradiology service  Workstation performed: [id number]         CT spine cervical without contrast   Final Result by Adrien Severin, MD (10/20 1591)      No cervical spine fracture or traumatic malalignment  Preliminary report was given by the teleradiology service  Workstation performed: [id number]         CT chest abdomen pelvis wo contrast   Final Result by Adrien Severin, MD (10/20 5944)      Ground glass opacities in the lungs may be related to pneumonitis  Follow-up would be useful  Dilatation of small bowel and right colon without obvious point of transition suggests ileus  The cecum is quite distended reaching 10 cm  Metabolic causes as well as ischemia can cause ileus  Clinical correlation is advised  Follow-up may be useful  There is no evidence of pneumoperitoneum or ascites  A report of infiltrates as well small bowel dilatation and L1 fracture was noted on the preliminary teleradiology report        Result was discussed with nurse practitioner Tiana Proctor in the ICU at 8:50 AM            Workstation performed: [id number]         XR chest portable ICU    (Results Pending)              Procedures  Procedures       ED Course                               MDM    Disposition  Final diagnoses:   Cardiogenic shock ([address] )   Acute encephalopathy     Time reflects when diagnosis was documented in both MDM as applicable and the Disposition within this note     Time User Action Codes Description Comment    10/20/2019 12:11 AM Jennyfer Quintero Add [R57 0] Cardiogenic shock ([address] )     10/20/2019 12:12 AM Jennyfer Quintero Add [G93 40] Acute encephalopathy     10/20/2019  1:30 AM Liset Mcginnis Add [J96 01,  J96 02] Acute respiratory failure with hypoxia and hypercapnia ([address] ) 10/20/2019  3:52 AM Dyane Bard Add [N17 9] Acute kidney injury ([facility] [address] )     10/20/2019  5:54 AM Dyane Bard Add [S71 109A] Multiple open wounds of thigh     10/20/2019 11:47 AM Velazquez Preeti E Add [R57 9] Shock ([facility] [address] )     10/20/2019 11:47 AM Velazquez Preeti E Add [E87 2] High anion gap metabolic acidosis     20/74/8348 11:47 AM Gil, [address] [place] [address]  XXXA] Hypothermia, initial encounter       ED Disposition     ED Disposition Condition Date/Time Comment    Admit Stable Sun Oct 20, 2019 12:10 AM Case was discussed with ICU and the patient's admission status was agreed to be Admission Status: inpatient status to the service of Dr Maynard Level   Follow-up Information    None         There are no discharge medications for this patient  No discharge procedures on file      ED Provider  Electronically Signed by           Aman Vick MD  10/20/19 6456

## 2019-10-20 NOTE — ED NOTES
1 amp of bicarb administered at this time        Zach Ha, JOJO  10/20/19 1224 Carmela Clarke RN  10/20/19 4943

## 2019-10-20 NOTE — ED NOTES
Pt arrives to ED with 2 Intraosseous lines in placed  1 placed on left knee and 2nd I/O placed on right shoulder        Sony Mcfadden RN  10/19/19 1578

## 2019-10-20 NOTE — ED PROCEDURE NOTE
Procedure  Central Line  Date/Time: 10/20/2019 4:30 AM  Performed by: Ron Rodriguez PA-C  Authorized by: Ron Rodriguez PA-C     Patient location:  ED  Other Assisting Provider: Yes (comment)    Consent:     Consent obtained:  Emergent situation    Consent given by:  Patient  [place] protocol:     Radiology Images displayed and confirmed  If images not available, report reviewed: yes      Required blood products, implants, devices, and special equipment available: yes      Patient identity confirmed:  Arm band  Pre-procedure details:     Hand hygiene: Hand hygiene performed prior to insertion      Sterile barrier technique: All elements of maximal sterile technique followed      Skin preparation:  ChloraPrep    Skin preparation agent: Skin preparation agent completely dried prior to procedure    Indications:     Central line indications: medications requiring central line    Anesthesia (see MAR for exact dosages): Anesthesia method:  None  Procedure details:     Location:  Left femoral    Vessel type: vein      Laterality:  Left    Approach: percutaneous technique used      Patient position:  Flat    Catheter type:  Triple lumen 20cm    Ultrasound guidance: yes      Sterile ultrasound techniques: Sterile gel and sterile probe covers were used      Number of attempts:  1    Successful placement: yes    Post-procedure details:     Post-procedure:  Dressing applied and line sutured    Assessment:  Blood return through all ports and free fluid flow    Patient tolerance of procedure:   Tolerated well, no immediate complications                     Ron Rodriguez PA-C  10/20/19 9116

## 2019-10-20 NOTE — PROCEDURES
Temporary HD Catheter  Date/Time: 10/20/2019 3:51 AM  Performed by: NANDO Rivera  Authorized by: NANDO Rivera     Patient location:  Bedside  Other Assisting Provider: No    Consent:     Consent obtained:  Emergent situation  Universal protocol:     Immediately prior to procedure, a time out was called: yes      Patient identity confirmed:  Arm band  Pre-procedure details:     Hand hygiene: Hand hygiene performed prior to insertion      Sterile barrier technique: All elements of maximal sterile technique followed      Skin preparation:  ChloraPrep    Skin preparation agent: Skin preparation agent completely dried prior to procedure    Indications:     Central line indications: medications requiring central line and dialysis      Site selection rationale:  RIJ well-visualized under ultrasound  Anesthesia (see MAR for exact dosages): Anesthesia method:  None  Procedure details:     Location:  Right internal jugular    Vessel type: vein      Approach: percutaneous technique used      Patient position:  Reverse Trendelenburg    Catheter size:  12 5 Fr    Catheter length:  16 cm    Landmarks identified: yes      Ultrasound guidance: yes      Sterile ultrasound techniques: Sterile gel and sterile probe covers were used      Number of attempts:  1    Successful placement: yes      Vessel of catheter tip end:  SVC  Post-procedure details:     Post-procedure:  Dressing applied and line sutured    Assessment:  Blood return through all ports, free fluid flow, no pneumothorax on x-ray and placement verified by x-ray    Patient tolerance of procedure:   Tolerated well, no immediate complications
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
## 2019-10-20 NOTE — RESPIRATORY THERAPY NOTE
RT Ventilator Management Note  Alpha Three [place] And [de-identified] 79 y o  female MRN: [id number]  Unit/Bed#:  Encounter: [id number]      Daily Screen       10/20/2019  0131 10/20/2019  0450          Patient safety screen outcome[de-identified]  Failed  Failed      Not Ready for Weaning due to[de-identified]  FiO2 >60%;PEEP > 8cmH2O;Underline problem not resolved; Patient Temp < 36°  Patient Temp < 36°;PEEP > 8cmH2O;Underline problem not resolved              Physical Exam:   Assessment Type: Assess only  General Appearance: Eyes open/responds to stimulus, Eyes open/responds to voice  Respiratory Pattern: Assisted  Chest Assessment: Chest expansion symmetrical  Bilateral Breath Sounds: Expiratory wheezes, Diminished, Coarse  O2 Device: vent       Resp Comments: Pt brought to ICU following presentation to ER via EMS  Pt was intubated pre hospital w/ 6 5ETT  Pt remains on full support at this time
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
## 2019-10-20 NOTE — ED NOTES
Pt given 1 amp of bicarb and 1 liter of NSS by Ems prior to hospital arrival       [facility], [facility]  10/20/19 9534

## 2019-10-20 NOTE — ASSESSMENT & PLAN NOTE
Unclear etiology  Potentially cardiogenic, check ECHO  Continue upeinephrine  Patient screens positive for sepsis  Continue norepinephrine

## 2019-10-20 NOTE — RESPIRATORY THERAPY NOTE
RT Ventilator Management Note  Alpha Three [place] And [de-identified] Eight 79 y o  female MRN: [id number]  Unit/Bed#:  Encounter: [id number]      Daily Screen       10/20/2019  0450 10/20/2019  0914          Patient safety screen outcome[de-identified]  Failed  Failed      Not Ready for Weaning due to[de-identified]  Patient Temp < 36°;PEEP > 8cmH2O;Underline problem not resolved  PEEP > 8cmH2O;Underline problem not resolved              Physical Exam:   Assessment Type: During-treatment  General Appearance: Sedated, Eyes open/responds to stimulus  Respiratory Pattern: Irregular, Tachypneic, Assisted  Chest Assessment: Chest expansion symmetrical  Bilateral Breath Sounds: Clear, Diminished  Cough: None  O2 Device: mechanical ventilation  Subjective Data: sedated with noted work of breathing      Resp Comments: patient remains intubated and sedated on full mechanical ventilation patient severely hemodynamic compromise on multiple vasopressors to mainatain adequet circulation  planontoeu curren t suport unitl further orders

## 2019-10-20 NOTE — ED NOTES
epidrip changed over to left femoral central line at this time        Rosie Orosco, RN  10/19/19 2142

## 2019-10-20 NOTE — ED NOTES
Aracelis Garland and Dr Farhana Conway at bedside of patient attempting central line placement        Harish Cardoza RN  10/19/19 5465
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
## 2019-10-20 NOTE — PROCEDURES
Arterial Line Insertion  Date/Time: 10/20/2019 1:29 AM  Performed by: NANDO Sheppard  Authorized by: NANDO Sheppard     Patient location:  Bedside  Other Assisting Provider: No    Consent:     Consent obtained:  Emergent situation  Universal protocol:     Immediately prior to procedure a time out was called: yes      Patient identity confirmed:  Arm band  Indications:     Indications: hemodynamic monitoring, multiple ABGs and continuous blood pressure monitoring    Pre-procedure details:     Skin preparation:  Chlorhexidine    Preparation: Patient was prepped and draped in sterile fashion    Anesthesia (see MAR for exact dosages):      Anesthesia method:  None  Procedure details:     Location / Tip of Catheter:  Radial    Laterality:  Right    Needle gauge:  20 G    Placement technique:  Ultrasound guided    Number of attempts:  1    Successful placement: yes      Transducer: waveform confirmed    Post-procedure details:     Post-procedure:  Secured with tape, sterile dressing applied and sutured    CMS:  Normal

## 2019-10-20 NOTE — PROGRESS NOTES
Vancomycin Assessment    Alpha Three [place] And [de-identified] Eight is a 79 y o  female who is currently ordered vancomycin 1250mg IV Q24H for other AMS, possible sepsis   Relevant clinical data and objective history reviewed:  Creatinine   Date Value Ref Range Status   10/20/2019 2 30 (H) 0 60 - 1 30 mg/dL Final     Comment:     Standardized to IDMS reference method   10/20/2019 2 61 (H) 0 60 - 1 30 mg/dL Final     Comment:     Standardized to IDMS reference method   10/20/2019 2 65 (H) 0 60 - 1 30 mg/dL Final     Comment:     Standardized to IDMS reference method     /75   Pulse (!) 107   Temp 97 9 °F (36 6 °C)   Resp (!) 25   Wt 84 8 kg (186 lb 15 2 oz)   SpO2 (!) 87%   I/O last 3 completed shifts:  In: -   Out: 251 [Other:251]  Lab Results   Component Value Date/Time    BUN 46 (H) 10/20/2019 05:20 AM    WBC 2 63 (L) 10/20/2019 05:20 AM    HGB 12 0 10/20/2019 05:20 AM    HCT 39 1 10/20/2019 05:20 AM     (H) 10/20/2019 05:20 AM     10/20/2019 05:20 AM     Temp Readings from Last 3 Encounters:   10/20/19 97 9 °F (36 6 °C)     Vancomycin Days of Therapy: 1    Assessment/Plan  The patient is currently on vancomycin utilizing scheduled dosing based on adjusted body weight (due to obesity)  Baseline risks associated with therapy include: concomitant nephrotoxic medications  The patient received 1250 + 500mg IV load (total 1750mg) is currently ordered 1250mg IV Q24H and after clinical evaluation will be changed to 1500mg IV Q24H  Pharmacy will also follow closely for s/sx of nephrotoxicity, infusion reactions and appropriateness of therapy  BMP and CBC will be ordered per protocol  Plan for trough as patient approaches steady state, prior to the 3rd  dose at approximately 0000 on 10/22/19  Due to infection severity, will target a trough of 15-20 (appropriate for most indications)   Pharmacy will continue to follow the patients culture results and clinical progress daily      Franci Venegas Pharmacist

## 2019-10-20 NOTE — ED NOTES
Patient transported to CT at this time accompanied by this nurse and respiratory        Judi Davis RN  10/20/19 0999
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
## 2019-10-20 NOTE — ED NOTES
Triple Lumen Central line placed through left femoral by Charmayne Monte, [id number], RN  10/19/19 5811

## 2019-10-20 NOTE — ASSESSMENT & PLAN NOTE
Likely secondary to metabolic/infectious derangements  Name supplied by police with outpatient record including some psychiatric history  Pending formal CT head interpretation  Will curbside toxicology

## 2019-10-20 NOTE — CONSULTS
Consultation - Nephrology   Alpha Three [place] And [de-identified] 79 y o  female MRN: [id number]  Unit/Bed#:  Encounter: [id number]    Referring PHYSICIAN: [address] [place] [address] Road:  Acute kidney injury and acidosis    DATE OF CONSULTATION:  October 28, 2019    ADMISSION DIAGNOSIS: Acute respiratory failure with hypoxia and hypercapnia ([address] )     CHIEF COMPLAINT     Patient was protein to emergency room by EMS and altered mental status requiring intubation and was hypothermic and bradycardic and had acute kidney injury    HPI     Apparently 1 of the friend called the EMS because of altered mental status  When EMS so the patient in the home the found she was quite hypothermic and on breathing well  She was intubated in the field and brought to the emergency room where she was found to  bradycardic and hypothermic  She was admitted to ICU    She has acute kidney injury and profound metabolic acidosis and oliguria  CRRT was started last night because of profound acidosis and oliguria    There is no other history available at there is no family to contact at this point    [address]     No past medical history on file  PAST SURGICAL HISTORY     No past surgical history on file  ALLERGIES     Allergies not on file    SOCIAL HISTORY     Social History     Substance and Sexual Activity   Alcohol Use Not on file     Social History     Substance and Sexual Activity   Drug Use Not on file     Social History     Tobacco Use   Smoking Status Not on file       FAMILY HISTORY     No family history on file      CURRENT MEDICATIONS       Current Facility-Administered Medications:     ascorbic acid 1,500 mg in sodium chloride 0 9 % 100 mL IVPB, 1,500 mg, Intravenous, Q6H, NANDO Somers, Stopped at 10/20/19 0808    calcium gluconate 3 g in sodium chloride 0 9 % 100 mL IVPB, 3 g, Intravenous, Once, Danna Stock MD, Last Rate: 50 mL/hr at 10/20/19 0808, 3 g at 10/20/19 3956    cefepime (MAXIPIME) 2,000 mg in dextrose 5 % 50 mL IVPB, 2,000 mg, Intravenous, Once, Nilay Vallejo CRNP    chlorhexidine (PERIDEX) 0 12 % oral rinse 15 mL, 15 mL, Swish & Spit, Q12H [facility], Naveen PHILIP JamesNP, 15 mL at 10/20/19 0131    dextrose 50 % IV solution **ADS Override Pull**, , , ,     EPINEPHrine 3000 mcg (STANDARD CONCENTRATION) IV in sodium chloride 0 9% 250 mL, 1-10 mcg/min, Intravenous, Titrated, Nilay Reus, CRNP, Last Rate: 35 mL/hr at 10/20/19 0846, 7 mcg/min at 10/20/19 0846    fentaNYL 1000 mcg in sodium chloride 0 9% 100mL infusion, 150 mcg/hr, Intravenous, Continuous, Nilay Reus, CRNP, Last Rate: 15 mL/hr at 10/20/19 0532, 150 mcg/hr at 10/20/19 0532    fentanyl citrate (PF) 100 MCG/2ML **ADS Override Pull**, , , ,     fentanyl citrate (PF) 100 MCG/2ML **ADS Override Pull**, , , ,     fentanyl citrate (PF) 100 MCG/2ML 100 mcg, 100 mcg, Intravenous, Q1H PRN, Nilay Vallejo, CRNP    heparin (porcine) subcutaneous injection 5,000 Units, 5,000 Units, Subcutaneous, Q8H [facility], PHILIP BarnettNP, 5,000 Units at 10/20/19 0548    hydrocortisone sodium succinate (PF) (Solu-CORTEF) injection 50 mg, 50 mg, Intravenous, Q6H [facility], PHILIP CurranNP, 50 mg at 10/20/19 0548    ipratropium (ATROVENT) 0 02 % inhalation solution 0 5 mg, 0 5 mg, Nebulization, Q6H, Jody Randle MD    levalbuterol [facility]) inhalation solution 1 25 mg, 1 25 mg, Nebulization, Q6H, Jody Randle MD    midazolam (VERSED) 2 mg/2 mL injection **ADS Override Pull**, , , ,     norepinephrine (LEVOPHED) 4 mg (STANDARD CONCENTRATION) IV in sodium chloride 0 9% 250 mL, 1-30 mcg/min, Intravenous, Titrated, NANDO Curran, Last Rate: 26 3 mL/hr at 10/20/19 0703, 7 mcg/min at 10/20/19 0703    NxStage K 2/Ca 3 dialysis solution (RFP-400) 20,000 mL, 20,000 mL, Dialysis, Continuous, Justino Alfred MD, 15,000 mL at 10/20/19 0434    pantoprazole (PROTONIX) injection 40 mg, 40 mg, Intravenous, Q24H [facility], NANDO Funes    sodium bicarbonate 150 mEq in dextrose 5 % 1,000 mL infusion, 125 mL/hr, Intravenous, Continuous, NANDO Funes, Last Rate: 125 mL/hr at 10/20/19 0222, 125 mL/hr at 10/20/19 0222    sodium chloride (concentrated) 154 mEq in dextrose 10 % 1,000 mL infusion, 50 mL/hr, Intravenous, Continuous, NANDO Funes, Last Rate: 50 mL/hr at 10/20/19 0228, 50 mL/hr at 10/20/19 0228    thiamine (VITAMIN B1) 200 mg in sodium chloride 0 9 % 50 mL IVPB, 200 mg, Intravenous, Q12H, NANDO Funes, Stopped at 10/20/19 0730    [START ON 10/21/2019] vancomycin (VANCOCIN) 1,500 mg in sodium chloride 0 9 % 250 mL IVPB, 1,500 mg, Intravenous, Q24H, NANDO Funes    REVIEW OF SYSTEMS     Review of Systems   Unable to perform ROS: Intubated     Patient was hypothermic and profound shock on admission  LAB RESULTS        Results from last 7 days   Lab Units 10/20/19  0520 10/20/19  0257 10/20/19  0123 10/19/19  2301 10/19/19  2258 10/19/19  2257   WBC Thousand/uL 2 63*  --   --  14 73*  --   --    HEMOGLOBIN g/dL 12 0  --   --  11 2*  --   --    I STAT HEMOGLOBIN g/dl  --   --   --   --  11 2* 11 2*   HEMATOCRIT % 39 1  --   --  39 3  --   --    HEMATOCRIT, ISTAT %  --   --   --   --  33* 33*   PLATELETS Thousands/uL 210  --   --  225  --   --    POTASSIUM mmol/L 4 2 4 8 5 0 4 8  --   --    CHLORIDE mmol/L 112* 111* 112* 114*  --   --    CO2 mmol/L 16* 16* 10* 15*  --   --    BUN mg/dL 46* 50* 46* 41*  --   --    CREATININE mg/dL 2 30* 2 61* 2 65* 2 35*  --   --    EGFR ml/min/1 73sq m 12 10 10 12  --  15   CALCIUM mg/dL 7 2* 5 8* 6 8* 7 1*  --   --    MAGNESIUM mg/dL 2 7* 3 2* 3 8*  --   --   --    PHOSPHORUS mg/dL 9 6* 14 1*  --   --   --   --    GLUCOSE, ISTAT mg/dl  --   --   --   --  58* 60*       I have personally reviewed the old medical records and patient's previously known baseline creatinine level is ~ unknown    RADIOLOGY RESULTS     No results found for this or any previous visit  No results found for this or any previous visit  No results found for this or any previous visit  No results found for this or any previous visit  No results found for this or any previous visit  No results found for this or any previous visit  OBJECTIVE     Current Weight: Weight - Scale: 84 8 kg (186 lb 15 2 oz)  Vitals:    10/20/19 0800   BP: 91/65   Pulse: 103   Resp: (!) 24   Temp: 99 5 °F (37 5 °C)   SpO2: 91%       Intake/Output Summary (Last 24 hours) at 10/20/2019 0855  Last data filed at 10/20/2019 0715  Gross per 24 hour   Intake 2655 03 ml   Output 756 ml   Net 1899 03 ml       PHYSICAL EXAMINATION     Physical Exam   Constitutional: She appears well-developed  No distress  HENT:   Head: Normocephalic  Eyes: Pupils are equal, round, and reactive to light  Conjunctivae and EOM are normal  No scleral icterus  Neck: Neck supple  No JVD present  Cardiovascular: Normal rate, regular rhythm and normal heart sounds  No murmur heard  Pulmonary/Chest: Effort normal  She has no wheezes  Patient is intubated   Abdominal: Soft  Bowel sounds are normal  There is tenderness  Musculoskeletal: Normal range of motion  She exhibits no edema  Neurological:   Patient is intubated and sedated but she is moving all extremity  Does not respond to verbal stimuli   Skin: Skin is warm  No rash noted  PLAN / RECOMMENDATIONS      Acute kidney injury:  Likely secondary to shock  CRRT was started because of persistent no leg urine acidosis which I will continue for the same reason  Her urine output which seems to be slowly improving  Will continue to monitor    High anion gap metabolic acidosis:  Patient does have lactic acidosis which can be responsible for high anion gap  She is on bicarb drip which I will continue at this point because of persistent acidosis in spite of CRRT      Respective failure:  Patient does have pneumonia by the CT scan she is on antibiotic    Hydronephrosis:  By the CT scan  Will continue to monitor    Hypotension:  Part of the shock picture    Encephalopathy:  Seems to be metabolic as other workup is negative  Will continue antibiotic in dialysis    Hypothermia:  Due to shock and sepsis    Patient overall prognosis guarded in view of profound metabolic abnormality  Will continue CRRT and monitor closely  Thank you for the consultation to participate in patient's care  I have personally discussed my plan with the referring physician  Surendra Culp MD  Nephrology  10/20/2019        Portions of the record may have been created with voice recognition software  Occasional wrong word or "sound a like" substitutions may have occurred due to the inherent limitations of voice recognition software  Read the chart carefully and recognize, using context, where substitutions have occurred

## 2019-10-21 VITALS
SYSTOLIC BLOOD PRESSURE: 108 MMHG | HEART RATE: 48 BPM | OXYGEN SATURATION: 100 % | WEIGHT: 186.95 LBS | DIASTOLIC BLOOD PRESSURE: 59 MMHG | TEMPERATURE: 97.7 F

## 2019-10-21 LAB
FLUAV AG SPEC QL: NOT DETECTED
FLUBV AG SPEC QL: NOT DETECTED
MRSA NOSE QL CULT: NORMAL
RSV B RNA SPEC QL NAA+PROBE: NOT DETECTED

## 2019-10-21 PROCEDURE — 99238 HOSP IP/OBS DSCHRG MGMT 30/<: CPT | Performed by: NURSE PRACTITIONER

## 2019-10-21 NOTE — DISCHARGE SUMMARY
Discharge Summary - Alpha Three [place] And [de-identified] 79 y o  female MRN: [id number]    Unit/Bed#:  Encounter: [id number] PCP: No primary care provider on file  Admission Date:   Admission Orders (From admission, onward)     Ordered        10/20/19 0011  Inpatient Admission  Once                     Admitting Diagnosis: Cardiogenic shock ([facility] [address] ) [R57 0]  Apnea [R06 81]  Acute encephalopathy [G93 40]    HPI:  Patient is a 62-year-old female initially presenting as a [place] on 10/20 after being found down by a friend  Prior to arrival in the emergency room she was intubated by the emergency medical services and was transcutaneous lipase secondary to profound bradycardia  On arrival to the emergency room she was hypothermic/hypotensive/hypoglycemic  She was fluid resuscitated and started on intravenous epinephrine with good effect  Her workup was significant for severe metabolic derangements, acute kidney injury, lactic acidosis, sepsis, abscess possible cardiogenic shock, aspiration pneumonia  She was referred to the critical care unit for admission  Procedures Performed:   Orders Placed This Encounter   Procedures    Central Line    Temporary HD Catheter       Summary of Hospital Course:  Her shock state unfortunately worsened on admission to the intensive care unit  Norepinephrine and stress dose steroids initiated with broad spectrum antibiotics  She received several boluses of sodium bicarbonate and sodium bicarbonate infusion was started for profound metabolic acidosis  Despite these interventions, the patient's acidosis continued to worsen and she had no urine output  Nephrology was contacted overnight who agreed with initiation of continuous renal replacement therapy  She had brief improvement in both her pressor requirement and metabolic acidosis into the morning on 10/20  However the patient's blood pressures declined again requiring initiation of vasopressin and phenylephrine  Her acidosis continued to worsen despite a bicarbonate infusion and intermittent administration of additional sodium bicarbonate  Family was located for the patient and contacted  A dose of methylene blue was trialed with little effect  Family present at the bedside and related that the patient had a past medical history of significant depression, with suicidal ideation and previous non the full attempts  Patient's family also reported history of intravenous drug use  They also disclosed that the patient's mother passed today prior to presentation  Given the patient's poor prognosis with multisystem organ failure, the decision was made to compassionately liberate the patient with transition of care to comfort only measures  Family was at bedside when the patient progressed asystole  Comfort was offered and the corner informed  Significant Findings, Care, Treatment and Services Provided:   / ECHO- systolic function was moderately to markedly reduced, study aborted early  10/20 CT Head- no acute intracranial abnormality, mucoperiosteal thickening air-fluid levels may be allergic/inflammatory in nature  10/20 CT Cervical spine- no cervical spine fracture or traumatic malalignment  10/20 CT Chest/abdomen/pelvis- ground-glass opacities in the lungs, dilation of small bowel and right colon without obvious point of transition with cecal distension reaching 10 cm, no evidence of pneumoperitoneum or ascites, compression fracture of L1  10/20 Right internal jugular dialysis catheter  10/20 Left femoral triple lumen cathter  10/20 Endotracheal tube    Complications: None    Disposition:      Final Diagnosis:   1  Shock-undifferentiated  2  Acute hypoxic/hypercarbic respiratory failure  3  High anion gap metabolic acidosis  4  Acute kidney injury requiring continuous renal replacement therapy  5  Transaminitis  6  Elevated troponin   7  Severe sepsis  8  Polysubstance abuse  9  Abnormal abdominal CT  10  Coagulopathy  11   Acute encephalopathy        Condition at Time of Death:  Critically ill, compassionate liberated with family at bedside    Date, Time and Cause of Death    Date of Death:  10/20/19  Time of Death:  11:50 PM  Preliminary Cause of Death:  Shock [facility])  Entered by:  Katharine Goins     Attribution     PG1 1 NANDO Connolly 10/20/19 23:57

## 2019-10-21 NOTE — PROGRESS NOTES
There was a representative from the [facility] in to see this pt today  In my presence, she photographed the wounds on the pts inner thighs, bilateral knees, on her neck, bilateral arms and right shoulder, as well as her face

## 2019-10-21 NOTE — UTILIZATION REVIEW
Initial Clinical Review    Admission: Date/Time/Statement: Inpatient Admission Orders (From admission, onward)     Ordered        10/20/19 0011  Inpatient Admission  Once                   Orders Placed This Encounter   Procedures    Inpatient Admission     Standing Status:   Standing     Number of Occurrences:   1     Order Specific Question:   Admitting Physician     Answer:   Jess Martinez [[id number]]     Order Specific Question:   Level of Care     Answer:   Critical Care [15]     Order Specific Question:   Estimated length of stay     Answer:   More than 2 Midnights     Order Specific Question:   Certification     Answer:   I certify that inpatient services are medically necessary for this patient for a duration of greater than two midnights  See H&P and MD Progress Notes for additional information about the patient's course of treatment  ED Arrival Information     Expected Arrival Acuity Means of Arrival Escorted By Service Admission Type    - 10/19/2019 22:44 Immediate Ambulance [address] Emergency    Arrival Complaint    -        Chief Complaint   Patient presents with    Apnea     Pt found unresponsive with GCS of 3 and no BP  Arrives to ED intubated being paced  Assessment/Plan: Middle age woman arrive to ED as Pina Lozoya via EMS after found down by a friend  On arrival to ED was intubated and being transcutaneously paced, given IVF and epi w/ good effect   Found to be hypothermic/ hypotensive and hypoglycemic   Admitted IP to ICU for significant metabolic derangements, MELONY, lactic acidosis, and signs of sepsis  Plan for aggressive rewarming , trend trop , check echo , cont abx and f/u cx , aggressive IVF   MELONY pt has made only 15 ml urine despite 4 l resuscitation   Nephrology consulted       PE : listless, toxic, distress, intubated, tachycardia , abd distention , diaphoretic , abrasions over shoulder and knees, red are- burn like appearance inner thighs     10/20 Temporary HD catheter placed     10/20 Nephrology consult   MELONY likely sec to shock   CRRT started , urine output slowly improving   High anion gap metabolic acidosis , + lactic acidosis , may be responsible for high anion gap   Encephalopathy cont abx   Prognosis is guarded     10/20 Toxicology consult   From a toxicology perspective this presentation is highly suspicious for ethylene glycol ingestion  The patient presented with a pH of 6 9 and a serum bicarbonate of 8, along with an MELONY and hypocalcemia, all suggestive of possible ethylene glycol toxicity  At time of my initial conversation with the ICU this morning I recommended that the patient receive loading dose of fomepazole, 15 mg/kg IV, which she is getting now, and that an ethylene glycol level be sent       Death Notice  10/20 1150  ED Triage Vitals   Temperature Pulse Respirations Blood Pressure SpO2   10/19/19 2300 10/19/19 2254 10/19/19 2254 10/19/19 2254 10/19/19 2315   (!) 87 1 °F (30 6 °C) 96 (!) 40 119/72 97 %      Temp Source Heart Rate Source Patient Position - Orthostatic VS BP Location FiO2 (%)   10/19/19 2300 10/19/19 2254 10/19/19 2254 10/19/19 2254 10/20/19 0131   Probe Monitor Lying Right arm 100      Pain Score       --               Wt Readings from Last 1 Encounters:   10/20/19 84 8 kg (186 lb 15 2 oz)     Additional Vital Signs:   10/20/19 2345  97 7 °F (36 5 °C)  48Abnormal   0Abnormal       25/22  23 mmHg  100 %       10/20/19 2334                100 %       10/20/19 2330  97 7 °F (36 5 °C)  104  32Abnormal       109/83  92 mmHg  100 %       10/20/19 2315  97 7 °F (36 5 °C)  107Abnormal   32Abnormal       119/87  99 mmHg  100 %       10/20/19 2300  97 7 °F (36 5 °C)  112Abnormal   32Abnormal       138/90  108 mmHg  98 %       10/20/19 2245  97 7 °F (36 5 °C)  104  32Abnormal       79/61  69 mmHg  95 %       10/20/19 2230  97 5 °F (36 4 °C)  114Abnormal   32Abnormal       108/77  88 mmHg  94 %     10/20/19 2215  97 5 °F (36 4 °C)  109Abnormal   32Abnormal       73/58  64 mmHg  94 %       10/20/19 2200  97 5 °F (36 4 °C)  110Abnormal   27Abnormal       90/68  77 mmHg  82 %Abnormal        10/20/19 2100  97 3 °F (36 3 °C)Abnormal   109Abnormal   28Abnormal   108/59        90 %       10/20/19 2045  97 3 °F (36 3 °C)Abnormal   109Abnormal   27Abnormal       101/76  85 mmHg  87 %Abnormal        10/20/19 2030  97 3 °F (36 3 °C)Abnormal   109Abnormal   27Abnormal       112/81  93 mmHg  72 %Abnormal        10/20/19 2015  97 2 °F (36 2 °C)Abnormal   110Abnormal   26Abnormal       123/83  99 mmHg  46 %Abnormal        10/20/19 2000  97 2 °F (36 2 °C)Abnormal   100  27Abnormal       73/58  65 mmHg  56 %Abnormal        10/20/19 1952                90 %  Other (comment)     10/20/19 1900  96 8 °F (36 °C)Abnormal   99  26Abnormal       101/73  85 mmHg  68 %Abnormal        10/20/19 1800  96 1 °F (35 6 °C)Abnormal   98  27Abnormal       100/69  81 mmHg  93 %  Ventilator     10/20/19 1700  95 5 °F (35 3 °C)Abnormal   97  37Abnormal       130/80  98 mmHg  91 %       10/20/19 1606    102  27Abnormal           93 %       10/20/19 1600  95 5 °F (35 3 °C)Abnormal   106Abnormal   30Abnormal       149/78  102 mmHg  93 %  Ventilator     10/20/19 1500  95 °F (35 °C)Abnormal   97  24Abnormal       95/60  73 mmHg  79 %Abnormal        10/20/19 1430  95 9 °F (35 5 °C)Abnormal   87  25Abnormal       103/66  83 mmHg  98 %       10/20/19 1400  97 3 °F (36 3 °C)Abnormal   85  25Abnormal       89/55  69 mmHg  92 %  Ventilator     10/20/19 1330  97 7 °F (36 5 °C)  90  22      102/56  74 mmHg  75 %Abnormal        10/20/19 1300  98 2 °F (36 8 °C)  86  23Abnormal       98/59  75 mmHg  81 %Abnormal        10/20/19 1230  98 6 °F (37 °C)  88  26Abnormal       98/60  76 mmHg  96 %       10/20/19 1200  99 °F (37 2 °C)  90  33Abnormal       79/57  64 mmHg  83 %Abnormal Ventilator     10/20/19 1130  99 3 °F (37 4 °C)  93  24Abnormal       91/68  76 mmHg  96 %       10/20/19 1122    96  29Abnormal           95 %       10/20/19 1100  99 5 °F (37 5 °C)  99  28Abnormal       95/76  84 mmHg  84 %Abnormal        10/20/19 1030  99 7 °F (37 6 °C)  101  42Abnormal        59/49  53 mmHg  82 %Abnormal        BP: unable to get cuff pressure at 10/20/19 1030   10/20/19 1015  99 7 °F (37 6 °C)  104  28Abnormal   111/75  86  65/51  56 mmHg  72 %Abnormal        10/20/19 1000  99 9 °F (37 7 °C)  105  21      66/52  57 mmHg  84 %Abnormal   Ventilator     10/20/19 0930  99 9 °F (37 7 °C)  105  20      70/54  61 mmHg  80 %Abnormal        10/20/19 0914    106Abnormal   30Abnormal           97 %       10/20/19 0913                97 %       10/20/19 0900  99 9 °F (37 7 °C)  101  28Abnormal       73/52  60 mmHg  91 %       10/20/19 0830  99 7 °F (37 6 °C)  102  22      87/60  69 mmHg  94 %       10/20/19 0800  99 5 °F (37 5 °C)  103  24Abnormal   91/65  73  83/60  69 mmHg  91 %  Ventilator  Lying   10/20/19 0730  99 1 °F (37 3 °C)  101  23Abnormal       85/62  71 mmHg  95 %       10/20/19 0700  98 8 °F (37 1 °C)  104  23Abnormal       79/57  65 mmHg  91 %  Ventilator     10/20/19 0605                93 %  Ventilator     10/20/19 0600  97 9 °F (36 6 °C)  107Abnormal   25Abnormal       82/56  65 mmHg  87 %Abnormal   Ventilator     10/20/19 0500  96 8 °F (36 °C)Abnormal   99  30Abnormal       106/57  71 mmHg  90 %  Ventilator     10/20/19 0450                94 %  Other (comment)     10/20/19 0400  96 7 °F (35 9 °C)Abnormal   100  31Abnormal   129/75  95  124/68  85 mmHg      Lying   10/20/19 0300  92 8 °F (33 8 °C)Abnormal   87  25Abnormal       112/65  81 mmHg  93 %  Ventilator     10/20/19 0255    87  33Abnormal           93 %       10/20/19 0200  89 4 °F (31 9 °C)Abnormal   82  35Abnormal   129/80  92  119/73 90 mmHg  92 %  Ventilator  Lying - Orthostatic VS   10/20/19 0142  88 7 °F (31 5 °C)Abnormal   76  38Abnormal   143/88  109  132/82  103 mmHg  91 %  Ventilator  Lying   10/20/19 0133  88 5 °F (31 4 °C)Abnormal   75  34Abnormal   143/84  101  124/71  93 mmHg  96 %  Ventilator  Lying   10/20/19 0131                97 %  Other (comment)     10/19/19 2315  87 4 °F (30 8 °C)Abnormal   89  49Abnormal   89/54Abnormal         97 %    Lying   10/19/19 2304  88 6 °F (31 4 °C)Abnormal                      10/19/19 2300  87 1 °F (30 6 °C)Abnormal   96  34Abnormal   116/58               10/19/19 2256    103  34Abnormal   130/52                     Pertinent Labs/Diagnostic Test Results:   10/20 CT head    No acute intracranial abnormality      Mucoperiosteal thickening air fluid levels may be allergic /inflammatory in nature  Occasionally these findings could be posttraumatic  The calvarium is intact    Pulmonary report was given by the teleradiology service  10/20 CT spine    No cervical spine fracture or traumatic malalignment    10/20 Ct chest    Ground glass opacities in the lungs may be related to pneumonitis  Follow-up would be useful    Dilatation of small bowel and right colon without obvious point of transition suggests ileus  The cecum is quite distended reaching 10 cm    Metabolic causes as well as ischemia can cause ileus  Clinical correlation is advised  Follow-up may be useful    There is no evidence of pneumoperitoneum or ascites     A report of infiltrates as well small bowel dilatation and L1 fracture was noted on the preliminary teleradiology report  10/20 CXR Minimal prominence of the interstitial reticular markings in the mid to lower lung zones without consolidation    10/20 EKG - afib   14/68 Echo Systolic function was moderately to markedly reduced  Can not quantify LV function   Study was aborted early  Results from last 7 days   Lab Units 10/20/19  8681 10/20/19  2564 10/20/19  1059 10/20/19  0520 10/19/19  2301   WBC Thousand/uL 5 11 4 86  --  2 63* 14 73*   HEMOGLOBIN g/dL 8 3* 10 3*  --  12 0 11 2*   I STAT HEMOGLOBIN g/dl  --   --  14 3  --   --    HEMATOCRIT % 27 5* 34 8  --  39 1 39 3   HEMATOCRIT, ISTAT %  --   --  42  --   --    PLATELETS Thousands/uL 108* 138*  --  210 225   NEUTROS ABS Thousands/µL  --   --   --   --  10 92*   BANDS PCT %  --   --   --  9*  --      Results from last 7 days   Lab Units 10/20/19  2152 10/20/19  1949 10/20/19  1948 10/20/19  1739 10/20/19  1738 10/20/19  1304 10/20/19  1302 10/20/19  1059 10/20/19  0933 10/20/19  0520 10/20/19  0257   SODIUM mmol/L 155* 149*  --  151*  --   --  149*  --  145 149* 152*   POTASSIUM mmol/L 5 0 4 6  --  4 0  --   --  4 8  --  6 0* 4 2 4 8   CHLORIDE mmol/L 103 103  --  103  --   --  104  --  108 112* 111*   CO2 mmol/L 22 13*  --  17*  --   --  12*  --  15* 16* 16*   CO2, I-STAT mmol/L  --   --   --   --   --   --   --  16*  --   --   --    ANION GAP mmol/L 30* 33*  --  31*  --   --  33*  --  22* 21* 25*   BUN mg/dL 21 22  --  25  --   --  31*  --  37* 46* 50*   CREATININE mg/dL 1 92* 1 85*  --  2 00*  --   --  2 20*  --  2 01* 2 30* 2 61*   EGFR ml/min/1 73sq m 15 16  --  14  --   --  13  --  14 12 10   CALCIUM mg/dL 10 2* 7 6*  --  9 3  --   --  6 8*  --  7 5* 7 2* 5 8*   CALCIUM, IONIZED mmol/L 1 28  --  1 01*  --  1 19 0 96*  --   --  1 02* 0 99* 0 82*   CALCIUM, IONIZED, ISTAT mmol/L  --   --   --   --   --   --   --  1 12  --   --   --    MAGNESIUM mg/dL  --  2 1  --   --   --   --  2 5  --  2 6 2 7* 3 2*   PHOSPHORUS mg/dL  --   --  8 1*  --   --   --   --   --   --  9 6* 14 1*     Results from last 7 days   Lab Units 10/20/19  0520 10/19/19  2301   AST U/L 621* 85*   ALT U/L 392* 40   ALK PHOS U/L 76 73   TOTAL PROTEIN g/dL 4 4* 4 5*   ALBUMIN g/dL 2 2* 2 2*   TOTAL BILIRUBIN mg/dL 0 80 0 70   BILIRUBIN DIRECT mg/dL  --  0 23*     Results from last 7 days   Lab Units 10/20/19  0222 10/20/19  7094 10/20/19  1740 10/20/19  1541 10/20/19  1407 10/20/19  1258 10/20/19  1159 10/20/19  0732 10/20/19  0558 10/20/19  0405   POC GLUCOSE mg/dl 160* 160* 177* 219* 183* 205* 176* 179* 192* 204*     Results from last 7 days   Lab Units 10/20/19  2152 10/20/19  1949 10/20/19  1739 10/20/19  1302 10/20/19  0933 10/20/19  0520 10/20/19  0257 10/20/19  0123 10/19/19  2301   GLUCOSE RANDOM mg/dL 118 132 150* 160* 139 197* 192* 62* 64*     Results from last 7 days   Lab Units 10/20/19  0520   HEMOGLOBIN A1C % 5 5   EAG mg/dl 111     Results from last 7 days   Lab Units 10/20/19  2152 10/20/19  1949 10/20/19  1741   PH ART  7 152* 7 131* 7 100*   PCO2 ART mm Hg 55 5* 35 5* 44 8*   PO2 ART mm Hg 76 2 134 1* 101 8   HCO3 ART mmol/L 19 0* 11 6* 13 6*   BASE EXC ART mmol/L -9 6 -16 5 -15 4   O2 CONTENT ART mL/dL 11 6* 14 4* 14 8*   O2 HGB, ARTERIAL % 88 1* 96 0 93 8*   ABG SOURCE  Line, Arterial Line, Arterial Line, Arterial     Results from last 7 days   Lab Units 10/19/19  2301   PH KRISTEN  6 519*   PCO2 KRISTEN mm Hg 98 4*   PO2 KRISTEN mm Hg 137 3*   HCO3 KRISTEN mmol/L 7 8*   BASE EXC KRISTEN mmol/L -32 6   O2 CONTENT KRISTEN ml/dL 16 4   O2 HGB, VENOUS % 91 8*     Results from last 7 days   Lab Units 10/20/19  1059 10/19/19  2258   PH, KRISTEN I-STAT   --  <6 599*   PCO2, KRISTEN ISTAT mm HG  --  >103 0*   PO2, KRISTEN ISTAT mm HG  --  123 0*   I STAT BASE EXC mmol/L -17*  --    I STAT O2 SAT % 99*  --    ISTAT PH ART  7 020*  --    I STAT ART PCO2 mm HG 55 0*  --    I STAT ART PO2 mm  0*  --    I STAT ART HCO3 mmol/L 14 2*  --      Results from last 7 days   Lab Units 10/20/19  0520 10/19/19  2337   CK TOTAL U/L 816* 208*   CK MB INDEX % 3 6* 2 4   CK MB ng/mL 29 4* 5 0     Results from last 7 days   Lab Units 10/20/19  1542 10/20/19  1301 10/20/19  1024 10/20/19  0520 10/20/19  0123 10/19/19  2301   TROPONIN I ng/mL 1 08* 0 58* 0 60* 0 32* 0 19* 0 08*     Results from last 7 days   Lab Units 10/20/19  0520   PROTIME seconds 17 1*   INR  1 38* Results from last 7 days   Lab Units 10/19/19  2301   TSH 3RD GENERATON uIU/mL 3 303     Results from last 7 days   Lab Units 10/20/19  0520   PROCALCITONIN ng/ml 6 44*     Results from last 7 days   Lab Units 10/20/19  1949 10/20/19  1738 10/20/19  1542 10/20/19  1301 10/20/19  1019 10/20/19  0541 10/20/19  0408   LACTIC ACID mmol/L 17 7* 17 5* 15 4* 13 5* 9 7* 6 4* 7 1*     Results from last 7 days   Lab Units 10/20/19  0933   DIGOXIN LVL ng/mL 0 2*     Results from last 7 days   Lab Units 10/20/19  0123   NT-PRO BNP pg/mL 2,759*     Results from last 7 days   Lab Units 10/20/19  0123   LIPASE u/L 2,399*     Results from last 7 days   Lab Units 10/20/19  0506   CLARITY UA  Cloudy   COLOR UA  Dk Yellow   SPEC GRAV UA  1 025   PH UA  5 0   GLUCOSE UA mg/dl 100 (1/10%)*   KETONES UA mg/dl Trace*   BLOOD UA  Large*   PROTEIN UA mg/dl 100 (2+)*   NITRITE UA  Negative   BILIRUBIN UA  Small*   UROBILINOGEN UA E U /dl 2 0*   LEUKOCYTES UA  Negative   WBC UA /hpf 1-2*   RBC UA /hpf 20-30*   BACTERIA UA /hpf Moderate*   EPITHELIAL CELLS WET PREP /hpf Occasional     Results from last 7 days   Lab Units 10/20/19  0506   STREP PNEUMONIAE ANTIGEN, URINE  Positive*   LEGIONELLA URINARY ANTIGEN  Negative     Results from last 7 days   Lab Units 10/20/19  0926   AMPH/METH  Positive*   BARBITURATE UR  Positive*   BENZODIAZEPINE UR  Positive*   COCAINE UR  Negative   METHADONE URINE  Negative   OPIATE UR  Negative   PCP UR  Negative   THC UR  Positive*     Results from last 7 days   Lab Units 10/20/19  0933 10/19/19  2301   ETHANOL LVL mg/dL <3  --    ACETAMINOPHEN LVL ug/mL 4 6* 5 5*   SALICYLATE LVL mg/dL 3 5  --      Results from last 7 days   Lab Units 10/20/19  0432 10/19/19  2336   GRAM STAIN RESULT  2+ Polys*  3+ Gram positive cocci in pairs, chains and clusters*  Rare Gram positive rods*  Rare Gram negative rods* Gram positive cocci in clusters*     Results from last 7 days   Lab Units 10/20/19  0520   TOTAL COUNTED 100     ED Treatment:   Medication Administration from 10/19/2019 2244 to 10/20/2019 0020       Date/Time Order Dose Route Action     10/19/2019 2315 sodium bicarbonate (FOR EMS ONLY) 8 4 % injection 50 mEq 0 mEq Does not apply Given to EMS     10/19/2019 2342 cefepime (MAXIPIME) 2,000 mg in dextrose 5 % 50 mL IVPB 2,000 mg Intravenous New Bag     10/19/2019 2315 EPINEPHrine 3000 mcg (STANDARD CONCENTRATION) IV in sodium chloride 0 9% 250 mL 5 mcg/min Intravenous New Bag        No past medical history on file    Present on Admission:   Acute encephalopathy   Acute respiratory failure with hypoxia and hypercapnia (HCC)   Shock (HCC)   Acute kidney injury ([address] )   Transaminitis   Lactic acidosis   High anion gap metabolic acidosis   Severe sepsis (HCC)   Elevated troponin   Hypothermia      Admitting Diagnosis: Cardiogenic shock (HCC) [R57 0]  Apnea [R06 81]  Acute encephalopathy [G93 40]  Age/Sex: 79 y o  female  Admission Orders:    ascorbic acid 1,500 mg in sodium chloride 0 9 % 100 mL IVPB, 1,500 mg, Intravenous, Q6H,      calcium gluconate 3 g in sodium chloride 0 9 % 100 mL IVPB, 3 g, Intravenous, Once    cefepime (MAXIPIME) 2,000 mg in dextrose 5 % 50 mL IVPB, 2,000 mg, Intravenous, Once    chlorhexidine (PERIDEX) 0 12 % oral rinse 15 mL, 15 mL, Swish & Spit, Q12H [facility]    dextrose 50 % IV solution     EPINEPHrine 3000 mcg (STANDARD CONCENTRATION) IV in sodium chloride 0 9% 250 mL, 1-10 mcg/min, Intravenous, Titrated,     fentaNYL 1000 mcg in sodium chloride 0 9% 100mL infusion, 150 mcg/hr, Intravenous, Continuous    fentanyl citrate (PF) 100 MCG/2ML     fentanyl citrate (PF) 100 MCG/2ML    fentanyl citrate (PF) 100 MCG/2ML 100 mcg, 100 mcg, Intravenous, Q1H PRN    heparin (porcine) subcutaneous injection 5,000 Units, 5,000 Units, Subcutaneous, Q8H     hydrocortisone sodium succinate (PF) (Solu-CORTEF) injection 50 mg, 50 mg, Intravenous, Q6H HARJEET    ipratropium (ATROVENT) 0 02 % inhalation solution 0 5 mg, 0 5 mg, Nebulization, Q6H, Tiffanie Dela Cruz MD    [facility]) inhalation solution 1 25 mg, 1 25 mg, Nebulization, Q6H    midazolam (VERSED) 2 mg/2 mL injection     norepinephrine (LEVOPHED) 4 mg (STANDARD CONCENTRATION) IV in sodium chloride 0 9% 250 mL, 1-30 mcg/min, Intravenous, Titrated    NxStage K 2/Ca 3 dialysis solution (RFP-400) 20,000 mL, 20,000 mL, Dialysis, Continuous, 15,000 mL     pantoprazole (PROTONIX) injection 40 mg, 40 mg, Intravenous, Q24H HARJEET    sodium bicarbonate 150 mEq in dextrose 5 % 1,000 mL infusion, 125 mL/hr, Intravenous    sodium chloride (concentrated) 154 mEq in dextrose 10 % 1,000 mL infusion, 50 mL/hr, Intravenous, Continuous    thiamine (VITAMIN B1) 200 mg in sodium chloride 0 9 % 50 mL IVPB, 200 mg, Intravenous, Q12H    vancomycin (VANCOCIN) 1,500 mg in sodium chloride 0 9 % 250 mL IVPB, 1,500 mg, Intravenous, Q24H       Fingerstick q2hr   Fall precautions   Up w/ assist   Daily weight   I&O   Dysphagia eval   NPO   ET tube suction   Hotchkiss insertion   Restraints non violent     IP CONSULT TO NEPHROLOGY  IP CONSULT TO TOXICOLOGY    Network Utilization Review Department  Phone: [phone]; Fax [phone]  [email or web address]  ATTENTION: Please call with any questions or concerns to [phone]  and carefully listen to the prompts so that you are directed to the right person  Send all requests for admission clinical reviews, approved or denied determinations and any other requests to fax [phone]   All voicemails are confidential

## 2019-10-21 NOTE — DEATH NOTE
INPATIENT DEATH NOTE  [place] [de-identified] 79 y o  female MRN: [id number]  Unit/Bed#:  Encounter: [id number]    Date, Time and Cause of Death    Date of Death:  10/20/19  Time of Death:  11:50 PM  Preliminary Cause of Death:  Shock ([address] )  Entered by:  Fernanda Jose     PG1 1 NANDO Rob 10/20/19 23:57           Patient's Information  Pronounced by: NANDO Rob  Did the patient's death occur in the ED?: No  Did the patient's death occur in the OR?: No  Did the patient's death occur less than 10 days post-op?: No  Did the patient's death occur within 24 hours of admission?: No  Was code status DNR at the time of death?: Yes    PHYSICAL EXAM:  Unresponsive to noxious stimuli, Spontaneous respirations absent, Breath sounds absent and Heart sounds absent    Medical Examiner notification criteria:  Suspected homicide or suicide and Suspected / documented overdose   Medical Examiner's office notified?:  Yes   Medical Examiner accepted case?:  Yes  Name of Medical Examiner: Sara Colin Notification  Was the family notified?: Yes  Date Notified: 10/20/19  Time Notified: 5059  Notified by: Cyndi Larose, Palmira Nguyen  Name of Family Notified of Death: Veverly Actis Person Relationship to Patient: Daughter  Family Notification Route: At bedside  Was the family told to contact a  home?: Yes  [facility][de[facility]identified] Alvera Pal    Autopsy Options:  Decision for post-mortem examination not yet made by next of kin      Primary Service Attending Physician notified?:  yes - Attending:  Lizzie Nunez MD    Physician/Resident responsible for completing Discharge Summary:  NANDO Rob

## 2019-10-21 NOTE — SOCIAL WORK
Cm received call from Kane Kaufman at [facility] requests chart records   Cm confirmed w NAIDA Alan and records sent

## 2019-10-22 LAB
ETHYLENE GLYCOL SERPLBLD-MCNC: NEGATIVE UG/ML
METHANOL SERPL-MCNC: NORMAL MG/DL

## 2019-10-23 LAB
BACTERIA BLD CULT: ABNORMAL
GRAM STN SPEC: ABNORMAL
GRAM STN SPEC: ABNORMAL

## 2019-10-24 LAB
BACTERIA SPT RESP CULT: ABNORMAL
BACTERIA SPT RESP CULT: ABNORMAL
GRAM STN SPEC: ABNORMAL

## 2019-12-03 DIAGNOSIS — R25.1 TREMOR: ICD-10-CM

## 2019-12-03 RX ORDER — PRIMIDONE 50 MG/1
TABLET ORAL
Qty: 180 TABLET | Refills: 1 | OUTPATIENT
Start: 2019-12-03

## (undated) DEVICE — Device